# Patient Record
Sex: FEMALE
[De-identification: names, ages, dates, MRNs, and addresses within clinical notes are randomized per-mention and may not be internally consistent; named-entity substitution may affect disease eponyms.]

---

## 2017-09-01 ENCOUNTER — TRANSCRIPTION ENCOUNTER (OUTPATIENT)
Age: 77
End: 2017-09-01

## 2020-11-03 ENCOUNTER — INPATIENT (INPATIENT)
Facility: HOSPITAL | Age: 80
LOS: 22 days | DRG: 82 | End: 2020-11-26
Attending: INTERNAL MEDICINE | Admitting: SURGERY
Payer: MEDICARE

## 2020-11-03 VITALS — DIASTOLIC BLOOD PRESSURE: 100 MMHG | HEART RATE: 111 BPM | SYSTOLIC BLOOD PRESSURE: 210 MMHG | OXYGEN SATURATION: 96 %

## 2020-11-03 DIAGNOSIS — S06.5X9A TRAUMATIC SUBDURAL HEMORRHAGE WITH LOSS OF CONSCIOUSNESS OF UNSPECIFIED DURATION, INITIAL ENCOUNTER: ICD-10-CM

## 2020-11-03 LAB
ALBUMIN SERPL ELPH-MCNC: 3.7 G/DL — SIGNIFICANT CHANGE UP (ref 3.3–5)
ALP SERPL-CCNC: 186 U/L — HIGH (ref 40–120)
ALT FLD-CCNC: 38 U/L — SIGNIFICANT CHANGE UP (ref 10–45)
ANION GAP SERPL CALC-SCNC: 10 MMOL/L — SIGNIFICANT CHANGE UP (ref 5–17)
ANION GAP SERPL CALC-SCNC: 12 MMOL/L — SIGNIFICANT CHANGE UP (ref 5–17)
APTT BLD: 26.5 SEC — LOW (ref 27.5–35.5)
APTT BLD: 27.9 SEC — SIGNIFICANT CHANGE UP (ref 27.5–35.5)
AST SERPL-CCNC: 31 U/L — SIGNIFICANT CHANGE UP (ref 10–40)
BASOPHILS # BLD AUTO: 0.06 K/UL — SIGNIFICANT CHANGE UP (ref 0–0.2)
BASOPHILS NFR BLD AUTO: 0.5 % — SIGNIFICANT CHANGE UP (ref 0–2)
BILIRUB SERPL-MCNC: 0.4 MG/DL — SIGNIFICANT CHANGE UP (ref 0.2–1.2)
BUN SERPL-MCNC: 23 MG/DL — SIGNIFICANT CHANGE UP (ref 7–23)
BUN SERPL-MCNC: 25 MG/DL — HIGH (ref 7–23)
CALCIUM SERPL-MCNC: 7.8 MG/DL — LOW (ref 8.4–10.5)
CALCIUM SERPL-MCNC: 9.3 MG/DL — SIGNIFICANT CHANGE UP (ref 8.4–10.5)
CHLORIDE SERPL-SCNC: 107 MMOL/L — SIGNIFICANT CHANGE UP (ref 96–108)
CHLORIDE SERPL-SCNC: 110 MMOL/L — HIGH (ref 96–108)
CO2 SERPL-SCNC: 18 MMOL/L — LOW (ref 22–31)
CO2 SERPL-SCNC: 22 MMOL/L — SIGNIFICANT CHANGE UP (ref 22–31)
CREAT SERPL-MCNC: 0.6 MG/DL — SIGNIFICANT CHANGE UP (ref 0.5–1.3)
CREAT SERPL-MCNC: 0.81 MG/DL — SIGNIFICANT CHANGE UP (ref 0.5–1.3)
EOSINOPHIL # BLD AUTO: 0.1 K/UL — SIGNIFICANT CHANGE UP (ref 0–0.5)
EOSINOPHIL NFR BLD AUTO: 0.8 % — SIGNIFICANT CHANGE UP (ref 0–6)
ETHANOL SERPL-MCNC: SIGNIFICANT CHANGE UP MG/DL (ref 0–10)
GAS PNL BLDA: SIGNIFICANT CHANGE UP
GLUCOSE SERPL-MCNC: 129 MG/DL — HIGH (ref 70–99)
GLUCOSE SERPL-MCNC: 139 MG/DL — HIGH (ref 70–99)
HCT VFR BLD CALC: 34.9 % — SIGNIFICANT CHANGE UP (ref 34.5–45)
HCT VFR BLD CALC: 43.1 % — SIGNIFICANT CHANGE UP (ref 34.5–45)
HGB BLD-MCNC: 11.1 G/DL — LOW (ref 11.5–15.5)
HGB BLD-MCNC: 13.5 G/DL — SIGNIFICANT CHANGE UP (ref 11.5–15.5)
IMM GRANULOCYTES NFR BLD AUTO: 1 % — SIGNIFICANT CHANGE UP (ref 0–1.5)
INR BLD: 1.13 RATIO — SIGNIFICANT CHANGE UP (ref 0.88–1.16)
INR BLD: 1.19 RATIO — HIGH (ref 0.88–1.16)
LIDOCAIN IGE QN: 23 U/L — SIGNIFICANT CHANGE UP (ref 7–60)
LYMPHOCYTES # BLD AUTO: 1.73 K/UL — SIGNIFICANT CHANGE UP (ref 1–3.3)
LYMPHOCYTES # BLD AUTO: 14.2 % — SIGNIFICANT CHANGE UP (ref 13–44)
MAGNESIUM SERPL-MCNC: 2 MG/DL — SIGNIFICANT CHANGE UP (ref 1.6–2.6)
MCHC RBC-ENTMCNC: 28.1 PG — SIGNIFICANT CHANGE UP (ref 27–34)
MCHC RBC-ENTMCNC: 28.4 PG — SIGNIFICANT CHANGE UP (ref 27–34)
MCHC RBC-ENTMCNC: 31.3 GM/DL — LOW (ref 32–36)
MCHC RBC-ENTMCNC: 31.8 GM/DL — LOW (ref 32–36)
MCV RBC AUTO: 89.3 FL — SIGNIFICANT CHANGE UP (ref 80–100)
MCV RBC AUTO: 89.6 FL — SIGNIFICANT CHANGE UP (ref 80–100)
MONOCYTES # BLD AUTO: 0.98 K/UL — HIGH (ref 0–0.9)
MONOCYTES NFR BLD AUTO: 8 % — SIGNIFICANT CHANGE UP (ref 2–14)
NEUTROPHILS # BLD AUTO: 9.23 K/UL — HIGH (ref 1.8–7.4)
NEUTROPHILS NFR BLD AUTO: 75.5 % — SIGNIFICANT CHANGE UP (ref 43–77)
NRBC # BLD: 0 /100 WBCS — SIGNIFICANT CHANGE UP (ref 0–0)
NRBC # BLD: 0 /100 WBCS — SIGNIFICANT CHANGE UP (ref 0–0)
PHOSPHATE SERPL-MCNC: 3.1 MG/DL — SIGNIFICANT CHANGE UP (ref 2.5–4.5)
PLATELET # BLD AUTO: 392 K/UL — SIGNIFICANT CHANGE UP (ref 150–400)
PLATELET # BLD AUTO: 548 K/UL — HIGH (ref 150–400)
POTASSIUM SERPL-MCNC: 3.4 MMOL/L — LOW (ref 3.5–5.3)
POTASSIUM SERPL-MCNC: 5.2 MMOL/L — SIGNIFICANT CHANGE UP (ref 3.5–5.3)
POTASSIUM SERPL-SCNC: 3.4 MMOL/L — LOW (ref 3.5–5.3)
POTASSIUM SERPL-SCNC: 5.2 MMOL/L — SIGNIFICANT CHANGE UP (ref 3.5–5.3)
PROT SERPL-MCNC: 6.5 G/DL — SIGNIFICANT CHANGE UP (ref 6–8.3)
PROTHROM AB SERPL-ACNC: 13.5 SEC — SIGNIFICANT CHANGE UP (ref 10.6–13.6)
PROTHROM AB SERPL-ACNC: 14.2 SEC — HIGH (ref 10.6–13.6)
RBC # BLD: 3.91 M/UL — SIGNIFICANT CHANGE UP (ref 3.8–5.2)
RBC # BLD: 4.81 M/UL — SIGNIFICANT CHANGE UP (ref 3.8–5.2)
RBC # FLD: 14.3 % — SIGNIFICANT CHANGE UP (ref 10.3–14.5)
RBC # FLD: 14.3 % — SIGNIFICANT CHANGE UP (ref 10.3–14.5)
SODIUM SERPL-SCNC: 139 MMOL/L — SIGNIFICANT CHANGE UP (ref 135–145)
SODIUM SERPL-SCNC: 140 MMOL/L — SIGNIFICANT CHANGE UP (ref 135–145)
TROPONIN T, HIGH SENSITIVITY RESULT: 19 NG/L — SIGNIFICANT CHANGE UP (ref 0–51)
WBC # BLD: 11.61 K/UL — HIGH (ref 3.8–10.5)
WBC # BLD: 12.22 K/UL — HIGH (ref 3.8–10.5)
WBC # FLD AUTO: 11.61 K/UL — HIGH (ref 3.8–10.5)
WBC # FLD AUTO: 12.22 K/UL — HIGH (ref 3.8–10.5)

## 2020-11-03 PROCEDURE — 71260 CT THORAX DX C+: CPT | Mod: 26

## 2020-11-03 PROCEDURE — 99223 1ST HOSP IP/OBS HIGH 75: CPT

## 2020-11-03 PROCEDURE — 70450 CT HEAD/BRAIN W/O DYE: CPT | Mod: 26,77

## 2020-11-03 PROCEDURE — 72125 CT NECK SPINE W/O DYE: CPT | Mod: 26

## 2020-11-03 PROCEDURE — 70450 CT HEAD/BRAIN W/O DYE: CPT | Mod: 26

## 2020-11-03 PROCEDURE — 74177 CT ABD & PELVIS W/CONTRAST: CPT | Mod: 26

## 2020-11-03 PROCEDURE — 71045 X-RAY EXAM CHEST 1 VIEW: CPT | Mod: 26

## 2020-11-03 RX ORDER — ETOMIDATE 2 MG/ML
20 INJECTION INTRAVENOUS ONCE
Refills: 0 | Status: COMPLETED | OUTPATIENT
Start: 2020-11-03 | End: 2020-11-03

## 2020-11-03 RX ORDER — POTASSIUM CHLORIDE 20 MEQ
10 PACKET (EA) ORAL
Refills: 0 | Status: DISCONTINUED | OUTPATIENT
Start: 2020-11-03 | End: 2020-11-04

## 2020-11-03 RX ORDER — NICARDIPINE HYDROCHLORIDE 30 MG/1
5 CAPSULE, EXTENDED RELEASE ORAL
Qty: 40 | Refills: 0 | Status: DISCONTINUED | OUTPATIENT
Start: 2020-11-03 | End: 2020-11-04

## 2020-11-03 RX ORDER — VECURONIUM BROMIDE 20 MG/1
10 INJECTION, POWDER, FOR SOLUTION INTRAVENOUS ONCE
Refills: 0 | Status: COMPLETED | OUTPATIENT
Start: 2020-11-03 | End: 2020-11-03

## 2020-11-03 RX ORDER — SODIUM CHLORIDE 9 MG/ML
1000 INJECTION INTRAMUSCULAR; INTRAVENOUS; SUBCUTANEOUS ONCE
Refills: 0 | Status: COMPLETED | OUTPATIENT
Start: 2020-11-03 | End: 2020-11-03

## 2020-11-03 RX ORDER — FENTANYL CITRATE 50 UG/ML
50 INJECTION INTRAVENOUS ONCE
Refills: 0 | Status: DISCONTINUED | OUTPATIENT
Start: 2020-11-03 | End: 2020-11-03

## 2020-11-03 RX ORDER — MIDAZOLAM HYDROCHLORIDE 1 MG/ML
2 INJECTION, SOLUTION INTRAMUSCULAR; INTRAVENOUS ONCE
Refills: 0 | Status: DISCONTINUED | OUTPATIENT
Start: 2020-11-03 | End: 2020-11-03

## 2020-11-03 RX ORDER — FENTANYL CITRATE 50 UG/ML
0.5 INJECTION INTRAVENOUS
Qty: 5000 | Refills: 0 | Status: DISCONTINUED | OUTPATIENT
Start: 2020-11-03 | End: 2020-11-04

## 2020-11-03 RX ORDER — SUCCINYLCHOLINE CHLORIDE 100 MG/5ML
100 SYRINGE (ML) INTRAVENOUS ONCE
Refills: 0 | Status: COMPLETED | OUTPATIENT
Start: 2020-11-03 | End: 2020-11-03

## 2020-11-03 RX ORDER — SODIUM CHLORIDE 9 MG/ML
1000 INJECTION INTRAMUSCULAR; INTRAVENOUS; SUBCUTANEOUS
Refills: 0 | Status: DISCONTINUED | OUTPATIENT
Start: 2020-11-03 | End: 2020-11-05

## 2020-11-03 RX ORDER — LABETALOL HCL 100 MG
10 TABLET ORAL ONCE
Refills: 0 | Status: COMPLETED | OUTPATIENT
Start: 2020-11-03 | End: 2020-11-04

## 2020-11-03 RX ADMIN — VECURONIUM BROMIDE 10 MILLIGRAM(S): 20 INJECTION, POWDER, FOR SOLUTION INTRAVENOUS at 21:25

## 2020-11-03 RX ADMIN — Medication 100 MILLIGRAM(S): at 21:13

## 2020-11-03 RX ADMIN — SODIUM CHLORIDE 1000 MILLILITER(S): 9 INJECTION INTRAMUSCULAR; INTRAVENOUS; SUBCUTANEOUS at 21:18

## 2020-11-03 RX ADMIN — FENTANYL CITRATE 50 MICROGRAM(S): 50 INJECTION INTRAVENOUS at 22:26

## 2020-11-03 RX ADMIN — ETOMIDATE 20 MILLIGRAM(S): 2 INJECTION INTRAVENOUS at 21:12

## 2020-11-03 RX ADMIN — MIDAZOLAM HYDROCHLORIDE 2 MILLIGRAM(S): 1 INJECTION, SOLUTION INTRAMUSCULAR; INTRAVENOUS at 21:15
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
## 2020-11-03 NOTE — H&P ADULT - NSHPLABSRESULTS_GEN_ALL_CORE
LABS:  ABG - ( 03 Nov 2020 22:08 )  pH, Arterial: 7.42  pH, Blood: x     /  pCO2: 34    /  pO2: 346   / HCO3: 21    / Base Excess: -2.2  /  SaO2: 100                 CBC Full  -  ( 03 Nov 2020 22:25 )  WBC Count : 11.61 K/uL  RBC Count : 3.91 M/uL  Hemoglobin : 11.1 g/dL  Hematocrit : 34.9 %  Platelet Count - Automated : 392 K/uL  Mean Cell Volume : 89.3 fl  Mean Cell Hemoglobin : 28.4 pg  Mean Cell Hemoglobin Concentration : 31.8 gm/dL  Auto Neutrophil # : x  Auto Lymphocyte # : x  Auto Monocyte # : x  Auto Eosinophil # : x  Auto Basophil # : x  Auto Neutrophil % : x  Auto Lymphocyte % : x  Auto Monocyte % : x  Auto Eosinophil % : x  Auto Basophil % : x    11-03    139  |  107  |  25<H>  ----------------------------<  139<H>  5.2   |  22  |  0.81    Ca    9.3      03 Nov 2020 21:20    TPro  6.5  /  Alb  3.7  /  TBili  0.4  /  DBili  x   /  AST  31  /  ALT  38  /  AlkPhos  186<H>  11-03    PT/INR - ( 03 Nov 2020 21:20 )   PT: 13.5 sec;   INR: 1.13 ratio         PTT - ( 03 Nov 2020 21:20 )  PTT:27.9 sec      RADIOLOGY & ADDITIONAL STUDIES  CXR:   CT head :  CT C-Spine:  CT C/A/P: LABS:  ABG - ( 2020 03:17 )  pH, Arterial: 7.44  pH, Blood: x     /  pCO2: 28    /  pO2: 153   / HCO3: 19    / Base Excess: -3.9  /  SaO2: 99                  CBC Full  -  ( 2020 03:13 )  WBC Count : 18.58 K/uL  RBC Count : 3.97 M/uL  Hemoglobin : 11.1 g/dL  Hematocrit : 34.9 %  Platelet Count - Automated : 448 K/uL  Mean Cell Volume : 87.9 fl  Mean Cell Hemoglobin : 28.0 pg  Mean Cell Hemoglobin Concentration : 31.8 gm/dL  Auto Neutrophil # : x  Auto Lymphocyte # : x  Auto Monocyte # : x  Auto Eosinophil # : x  Auto Basophil # : x  Auto Neutrophil % : x  Auto Lymphocyte % : x  Auto Monocyte % : x  Auto Eosinophil % : x  Auto Basophil % : x    11-04    138  |  111<H>  |  18  ----------------------------<  139<H>  4.7   |  18<L>  |  0.59    Ca    7.9<L>      2020 03:13  Phos  2.4     -  Mg     2.1     -    TPro  6.5  /  Alb  3.7  /  TBili  0.4  /  DBili  x   /  AST  31  /  ALT  38  /  AlkPhos  186<H>  11-03    PT/INR - ( 2020 03:13 )   PT: 14.3 sec;   INR: 1.20 ratio         PTT - ( 2020 03:13 )  PTT:26.7 sec      Urinalysis Basic - ( 2020 00:20 )    Color: Light Orange / Appearance: Turbid / S.029 / pH: x  Gluc: x / Ketone: Negative  / Bili: Negative / Urobili: Negative   Blood: x / Protein: Trace / Nitrite: Positive   Leuk Esterase: Large / RBC: 11 /hpf /  /HPF   Sq Epi: x / Non Sq Epi: 1 /hpf / Bacteria: Many        Head CT: Acute left holohemispheric subdural hemorrhage and right parietotemporal subdural hemorrhage. Subdural hemorrhage extends along the falx and left side of the medulla. There is associated 1.2 cm rightward midline shift.    Cervical spine CT: No acute fracture. Cervical degenerative spondylosis, as described above.    CT C/A/P:Acute fractures of the left lateral eighth, ninth, and 10th ribs.    Ill-defined masslike region of the left hepatic lobe is indeterminate. Differential includes benign and malignant etiologies as well as possibility of a pseudolesion with heterogeneous fat deposition. Recommend MRI of the abdomen with intravenous contrast for further evaluation.    Solid left renal lesion compatible with renal cell carcinoma until proven otherwise.

## 2020-11-03 NOTE — ED ADULT NURSE NOTE - TEMPLATE LIST FOR HEAD TO TOE ASSESSMENT
Pt complains of left lower back pain started Friday, noted that it flared up more yesterday at work.  Pt states the pain radiates down his left buttocks.His last dose of ibuprofen was last night  
Fall

## 2020-11-03 NOTE — ED PROVIDER NOTE - PHYSICAL EXAMINATION
Exam:  General: Patient shallow breathing, no response to verbal stimuli, significantly hypertensive, satting 100% on NRB otherwise vital signs within normal limits  HEENT: c-collar present, L forehead hematoma  Cardiac: RRR S1/S2 with strong peripheral pulses  Respiratory: shallow respirations  GI: abdomen soft, no appreciable trauma  Neuro: limited exam, spontaneously moving bilateral upper extremities, withdrawing lower extremities to pain  MSK: no appreciated extremity deformity

## 2020-11-03 NOTE — ED PROVIDER NOTE - ATTENDING CONTRIBUTION TO CARE
Patient seen and evaluated with resident/NP/PA, however HPI, ROS, PE and MDM as documented authored by myself unless otherwise noted- Sj Calvin MD
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
## 2020-11-03 NOTE — ED PROVIDER NOTE - CLINICAL SUMMARY MEDICAL DECISION MAKING FREE TEXT BOX
Patient BIBEMS unresponsive with head injury, no further history available, intubated in Emergency Department for level of consciousness to protect airway, level I trauma activated, after ETT placement confirmed and primary/secondary survey completed taken to CT scan with trauma surgery where acute subdural noted on rapid review of CTH, no obvious traumatic pathology appreciated on brief review of other scans in radiology control room, trauma surgery to admit SICU.

## 2020-11-03 NOTE — ED PROVIDER NOTE - OBJECTIVE STATEMENT
Patient BIBEMS unresponsive from nursing home.  Per EMS they were told by NH staff that patient fell and was unresponsive afterwards, unclear what caused fall.  On their arrival patient was breathing but not responding.  Patient unable to give history on arrival to Emergency Department.  No medical history/medications/advance directives or other documentation available from EMS brought to Emergency Department.

## 2020-11-03 NOTE — H&P ADULT - ATTENDING COMMENTS
Pt seen and examined as Level 1 trauma activation. Agree with A/P. Admit to ACS, SICU. Intubated, sedated, repeat CT head pending, f/u imaging, keppra, hold chemical dvt prophylaxis.

## 2020-11-03 NOTE — H&P ADULT - HISTORY OF PRESENT ILLNESS
HPI: This is an 80 year old female, with a medical history significant for dementia, not on A/C, BIBEMS unresponsive with head injury, reportedly fell, no further history available.   In the trauma bay, she was intubated for level of consciousness to protect airway, GCS was 8, level I trauma activated. In addition, primary survey significant for  mm Hg. After ETT placement confirmed and primary/secondary survey completed, significant for 1 cm laceration in left side of forehead and abrasion in RLQ of abdomen. She was taken to CT scan where acute subdural noted on rapid review of CTH was seen, no obvious traumatic pathology appreciated.     Primary survey   Airway: protected with ETT  breathing: b/l breath sounds   Circulation: -220, strong pulses in 4 extremities   Disability: GCS 8 before intubation   Exposure: obtained     secondary survey   General: Patient shallow breathing, no response to verbal stimuli, significantly hypertensive, saturation is 99% on NRB   HEENT: c-collar present, L forehead hematoma and laceration, pupils b/l 2 mm reactive   Cardiac: RRR,  strong peripheral pulses  Respiratory: shallow respirations  GI: abdomen soft, ND, NTTP   Pelvis: stable   Neuro: limited exam, spontaneously moving bilateral upper extremities, withdrawing lower extremities to pain  MSK: no appreciated extremity deformity    UTO medical and surgical history from the patient, as per record from nursing home, the patient has dementia, hydrocephalus and has a  shunt, acetabular fx

## 2020-11-03 NOTE — H&P ADULT - ASSESSMENT
A/P: This is an 80 year old female, with a medical history significant for dementia, not on A/C, BIBEMS unresponsive with head injury, reportedly fell, intubated in the ED to protect airway, imaging studies showed left acute SDH.     - Admit to ACS/Trauma - Dr. Bosch   - SICU consultation   - Appreciate neurosurgery input for the management of SDH  - follow up final read of trauma scans   - Tertiary survey in 24 hours     9007 A/P: This is an 80 year old female, with a medical history significant for dementia, not on A/C, BIBEMS unresponsive with head injury, reportedly fell, intubated in the ED to protect airway, imaging slabstudies showed left acute SDH, repeat CT after ligation shows stable SDH.     - Admit to ACS/Trauma - Dr. Bosch   - SICU consultation   - Appreciate neurosurgery input for the management of SDH (Conservative management at this time, repeat head CT in AM  - Tertiary survey in 24 hours     [id number]

## 2020-11-04 DIAGNOSIS — Z98.2 PRESENCE OF CEREBROSPINAL FLUID DRAINAGE DEVICE: Chronic | ICD-10-CM

## 2020-11-04 LAB
ANION GAP SERPL CALC-SCNC: 12 MMOL/L — SIGNIFICANT CHANGE UP (ref 5–17)
ANION GAP SERPL CALC-SCNC: 9 MMOL/L — SIGNIFICANT CHANGE UP (ref 5–17)
APPEARANCE UR: ABNORMAL
APTT BLD: 26.7 SEC — LOW (ref 27.5–35.5)
APTT BLD: 28.2 SEC — SIGNIFICANT CHANGE UP (ref 27.5–35.5)
BILIRUB UR-MCNC: NEGATIVE — SIGNIFICANT CHANGE UP
BLD GP AB SCN SERPL QL: POSITIVE — SIGNIFICANT CHANGE UP
BUN SERPL-MCNC: 18 MG/DL — SIGNIFICANT CHANGE UP (ref 7–23)
BUN SERPL-MCNC: 20 MG/DL — SIGNIFICANT CHANGE UP (ref 7–23)
CALCIUM SERPL-MCNC: 7.9 MG/DL — LOW (ref 8.4–10.5)
CALCIUM SERPL-MCNC: 8.2 MG/DL — LOW (ref 8.4–10.5)
CHLORIDE SERPL-SCNC: 108 MMOL/L — SIGNIFICANT CHANGE UP (ref 96–108)
CHLORIDE SERPL-SCNC: 111 MMOL/L — HIGH (ref 96–108)
CO2 SERPL-SCNC: 18 MMOL/L — LOW (ref 22–31)
CO2 SERPL-SCNC: 18 MMOL/L — LOW (ref 22–31)
COLOR SPEC: ABNORMAL
CREAT SERPL-MCNC: 0.57 MG/DL — SIGNIFICANT CHANGE UP (ref 0.5–1.3)
CREAT SERPL-MCNC: 0.59 MG/DL — SIGNIFICANT CHANGE UP (ref 0.5–1.3)
DAT POLY-SP REAG RBC QL: NEGATIVE — SIGNIFICANT CHANGE UP
DIFF PNL FLD: ABNORMAL
GAS PNL BLDA: SIGNIFICANT CHANGE UP
GAS PNL BLDA: SIGNIFICANT CHANGE UP
GLUCOSE SERPL-MCNC: 139 MG/DL — HIGH (ref 70–99)
GLUCOSE SERPL-MCNC: 176 MG/DL — HIGH (ref 70–99)
GLUCOSE UR QL: NEGATIVE — SIGNIFICANT CHANGE UP
HCT VFR BLD CALC: 34.9 % — SIGNIFICANT CHANGE UP (ref 34.5–45)
HCT VFR BLD CALC: 37.9 % — SIGNIFICANT CHANGE UP (ref 34.5–45)
HEPARINASE TEG R TIME: 4.9 MIN — SIGNIFICANT CHANGE UP (ref 4.3–8.3)
HGB BLD-MCNC: 11.1 G/DL — LOW (ref 11.5–15.5)
HGB BLD-MCNC: 11.9 G/DL — SIGNIFICANT CHANGE UP (ref 11.5–15.5)
INR BLD: 1.18 RATIO — HIGH (ref 0.88–1.16)
INR BLD: 1.2 RATIO — HIGH (ref 0.88–1.16)
KETONES UR-MCNC: NEGATIVE — SIGNIFICANT CHANGE UP
LEUKOCYTE ESTERASE UR-ACNC: ABNORMAL
MAGNESIUM SERPL-MCNC: 2.1 MG/DL — SIGNIFICANT CHANGE UP (ref 1.6–2.6)
MAGNESIUM SERPL-MCNC: 2.2 MG/DL — SIGNIFICANT CHANGE UP (ref 1.6–2.6)
MCHC RBC-ENTMCNC: 27.9 PG — SIGNIFICANT CHANGE UP (ref 27–34)
MCHC RBC-ENTMCNC: 28 PG — SIGNIFICANT CHANGE UP (ref 27–34)
MCHC RBC-ENTMCNC: 31.4 GM/DL — LOW (ref 32–36)
MCHC RBC-ENTMCNC: 31.8 GM/DL — LOW (ref 32–36)
MCV RBC AUTO: 87.9 FL — SIGNIFICANT CHANGE UP (ref 80–100)
MCV RBC AUTO: 89 FL — SIGNIFICANT CHANGE UP (ref 80–100)
NITRITE UR-MCNC: POSITIVE
NRBC # BLD: 0 /100 WBCS — SIGNIFICANT CHANGE UP (ref 0–0)
NRBC # BLD: 0 /100 WBCS — SIGNIFICANT CHANGE UP (ref 0–0)
PCP SPEC-MCNC: SIGNIFICANT CHANGE UP
PH UR: 7 — SIGNIFICANT CHANGE UP (ref 5–8)
PHOSPHATE SERPL-MCNC: 2.4 MG/DL — LOW (ref 2.5–4.5)
PHOSPHATE SERPL-MCNC: 2.9 MG/DL — SIGNIFICANT CHANGE UP (ref 2.5–4.5)
PLATELET # BLD AUTO: 448 K/UL — HIGH (ref 150–400)
PLATELET # BLD AUTO: 513 K/UL — HIGH (ref 150–400)
PLATELET MAPPING ACTF MAX AMPLITUDE: 27.5 MM (ref 2–19)
PLATELET MAPPING ADP MAXIMUM AMPLITUDE: 49 MM — SIGNIFICANT CHANGE UP (ref 45–69)
PLATELET MAPPING ADP PERCENT INHIBITION: 50.3 % (ref 0–17)
PLATELET MAPPING HKH MAXIMUM AMPLITUDE: 70.8 MM (ref 53–68)
POTASSIUM SERPL-MCNC: 4 MMOL/L — SIGNIFICANT CHANGE UP (ref 3.5–5.3)
POTASSIUM SERPL-MCNC: 4.7 MMOL/L — SIGNIFICANT CHANGE UP (ref 3.5–5.3)
POTASSIUM SERPL-SCNC: 4 MMOL/L — SIGNIFICANT CHANGE UP (ref 3.5–5.3)
POTASSIUM SERPL-SCNC: 4.7 MMOL/L — SIGNIFICANT CHANGE UP (ref 3.5–5.3)
PROT UR-MCNC: ABNORMAL
PROTHROM AB SERPL-ACNC: 14 SEC — HIGH (ref 10.6–13.6)
PROTHROM AB SERPL-ACNC: 14.3 SEC — HIGH (ref 10.6–13.6)
RAPIDTEG MAXIMUM AMPLITUDE: 71.3 MM (ref 52–70)
RBC # BLD: 3.97 M/UL — SIGNIFICANT CHANGE UP (ref 3.8–5.2)
RBC # BLD: 4.26 M/UL — SIGNIFICANT CHANGE UP (ref 3.8–5.2)
RBC # FLD: 14.4 % — SIGNIFICANT CHANGE UP (ref 10.3–14.5)
RBC # FLD: 14.5 % — SIGNIFICANT CHANGE UP (ref 10.3–14.5)
RH IG SCN BLD-IMP: POSITIVE — SIGNIFICANT CHANGE UP
SARS-COV-2 IGG SERPL QL IA: NEGATIVE — SIGNIFICANT CHANGE UP
SARS-COV-2 IGM SERPL IA-ACNC: 0.09 INDEX — SIGNIFICANT CHANGE UP
SARS-COV-2 RNA SPEC QL NAA+PROBE: SIGNIFICANT CHANGE UP
SODIUM SERPL-SCNC: 138 MMOL/L — SIGNIFICANT CHANGE UP (ref 135–145)
SODIUM SERPL-SCNC: 138 MMOL/L — SIGNIFICANT CHANGE UP (ref 135–145)
SP GR SPEC: 1.03 — HIGH (ref 1.01–1.02)
TEG FUNCTIONAL FIBRINOGEN: 40.5 MM (ref 15–32)
TEG MAXIMUM AMPLITUDE: 70.6 MM (ref 52–69)
TEG REACTION TIME: 5.3 MIN — SIGNIFICANT CHANGE UP (ref 4.6–9.1)
UROBILINOGEN FLD QL: NEGATIVE — SIGNIFICANT CHANGE UP
WBC # BLD: 18.58 K/UL — HIGH (ref 3.8–10.5)
WBC # BLD: 22.51 K/UL — HIGH (ref 3.8–10.5)
WBC # FLD AUTO: 18.58 K/UL — HIGH (ref 3.8–10.5)
WBC # FLD AUTO: 22.51 K/UL — HIGH (ref 3.8–10.5)

## 2020-11-04 PROCEDURE — 72170 X-RAY EXAM OF PELVIS: CPT | Mod: 26

## 2020-11-04 PROCEDURE — 99291 CRITICAL CARE FIRST HOUR: CPT | Mod: 25

## 2020-11-04 PROCEDURE — 99232 SBSQ HOSP IP/OBS MODERATE 35: CPT

## 2020-11-04 PROCEDURE — 36620 INSERTION CATHETER ARTERY: CPT

## 2020-11-04 PROCEDURE — 71045 X-RAY EXAM CHEST 1 VIEW: CPT | Mod: 26

## 2020-11-04 PROCEDURE — 86077 PHYS BLOOD BANK SERV XMATCH: CPT

## 2020-11-04 PROCEDURE — 70450 CT HEAD/BRAIN W/O DYE: CPT | Mod: 26

## 2020-11-04 RX ORDER — FENTANYL CITRATE 50 UG/ML
25 INJECTION INTRAVENOUS
Refills: 0 | Status: DISCONTINUED | OUTPATIENT
Start: 2020-11-04 | End: 2020-11-09

## 2020-11-04 RX ORDER — ACETAMINOPHEN 500 MG
1000 TABLET ORAL EVERY 6 HOURS
Refills: 0 | Status: COMPLETED | OUTPATIENT
Start: 2020-11-04 | End: 2020-11-05

## 2020-11-04 RX ORDER — PHENYLEPHRINE HYDROCHLORIDE 10 MG/ML
0.4 INJECTION INTRAVENOUS
Qty: 40 | Refills: 0 | Status: DISCONTINUED | OUTPATIENT
Start: 2020-11-04 | End: 2020-11-04

## 2020-11-04 RX ORDER — SODIUM CHLORIDE 9 MG/ML
1000 INJECTION INTRAMUSCULAR; INTRAVENOUS; SUBCUTANEOUS ONCE
Refills: 0 | Status: COMPLETED | OUTPATIENT
Start: 2020-11-04 | End: 2020-11-04

## 2020-11-04 RX ORDER — DEXMEDETOMIDINE HYDROCHLORIDE IN 0.9% SODIUM CHLORIDE 4 UG/ML
0.5 INJECTION INTRAVENOUS
Qty: 200 | Refills: 0 | Status: DISCONTINUED | OUTPATIENT
Start: 2020-11-04 | End: 2020-11-04

## 2020-11-04 RX ORDER — CHLORHEXIDINE GLUCONATE 213 G/1000ML
15 SOLUTION TOPICAL EVERY 12 HOURS
Refills: 0 | Status: DISCONTINUED | OUTPATIENT
Start: 2020-11-04 | End: 2020-11-06

## 2020-11-04 RX ORDER — LEVETIRACETAM 250 MG/1
500 TABLET, FILM COATED ORAL EVERY 12 HOURS
Refills: 0 | Status: DISCONTINUED | OUTPATIENT
Start: 2020-11-04 | End: 2020-11-08

## 2020-11-04 RX ORDER — CEFEPIME 1 G/1
1000 INJECTION, POWDER, FOR SOLUTION INTRAMUSCULAR; INTRAVENOUS ONCE
Refills: 0 | Status: COMPLETED | OUTPATIENT
Start: 2020-11-04 | End: 2020-11-04

## 2020-11-04 RX ORDER — CEFEPIME 1 G/1
1000 INJECTION, POWDER, FOR SOLUTION INTRAMUSCULAR; INTRAVENOUS EVERY 12 HOURS
Refills: 0 | Status: DISCONTINUED | OUTPATIENT
Start: 2020-11-04 | End: 2020-11-06

## 2020-11-04 RX ORDER — ACETAMINOPHEN 500 MG
1000 TABLET ORAL ONCE
Refills: 0 | Status: COMPLETED | OUTPATIENT
Start: 2020-11-04 | End: 2020-11-04

## 2020-11-04 RX ORDER — LEVETIRACETAM 250 MG/1
500 TABLET, FILM COATED ORAL EVERY 12 HOURS
Refills: 0 | Status: DISCONTINUED | OUTPATIENT
Start: 2020-11-04 | End: 2020-11-04

## 2020-11-04 RX ORDER — MEROPENEM 1 G/30ML
1000 INJECTION INTRAVENOUS EVERY 8 HOURS
Refills: 0 | Status: DISCONTINUED | OUTPATIENT
Start: 2020-11-04 | End: 2020-11-04

## 2020-11-04 RX ORDER — FENTANYL CITRATE 50 UG/ML
25 INJECTION INTRAVENOUS ONCE
Refills: 0 | Status: DISCONTINUED | OUTPATIENT
Start: 2020-11-04 | End: 2020-11-04

## 2020-11-04 RX ORDER — CHLORHEXIDINE GLUCONATE 213 G/1000ML
1 SOLUTION TOPICAL DAILY
Refills: 0 | Status: DISCONTINUED | OUTPATIENT
Start: 2020-11-04 | End: 2020-11-06

## 2020-11-04 RX ORDER — CEFEPIME 1 G/1
INJECTION, POWDER, FOR SOLUTION INTRAMUSCULAR; INTRAVENOUS
Refills: 0 | Status: DISCONTINUED | OUTPATIENT
Start: 2020-11-04 | End: 2020-11-06

## 2020-11-04 RX ORDER — PANTOPRAZOLE SODIUM 20 MG/1
40 TABLET, DELAYED RELEASE ORAL DAILY
Refills: 0 | Status: DISCONTINUED | OUTPATIENT
Start: 2020-11-04 | End: 2020-11-09

## 2020-11-04 RX ADMIN — SODIUM CHLORIDE 75 MILLILITER(S): 9 INJECTION INTRAMUSCULAR; INTRAVENOUS; SUBCUTANEOUS at 19:09

## 2020-11-04 RX ADMIN — FENTANYL CITRATE 25 MICROGRAM(S): 50 INJECTION INTRAVENOUS at 12:51

## 2020-11-04 RX ADMIN — FENTANYL CITRATE 25 MICROGRAM(S): 50 INJECTION INTRAVENOUS at 21:00

## 2020-11-04 RX ADMIN — CEFEPIME 100 MILLIGRAM(S): 1 INJECTION, POWDER, FOR SOLUTION INTRAMUSCULAR; INTRAVENOUS at 17:14

## 2020-11-04 RX ADMIN — DEXMEDETOMIDINE HYDROCHLORIDE IN 0.9% SODIUM CHLORIDE 8.5 MICROGRAM(S)/KG/HR: 4 INJECTION INTRAVENOUS at 03:22

## 2020-11-04 RX ADMIN — FENTANYL CITRATE 25 MICROGRAM(S): 50 INJECTION INTRAVENOUS at 10:15

## 2020-11-04 RX ADMIN — FENTANYL CITRATE 25 MICROGRAM(S): 50 INJECTION INTRAVENOUS at 13:05

## 2020-11-04 RX ADMIN — CHLORHEXIDINE GLUCONATE 15 MILLILITER(S): 213 SOLUTION TOPICAL at 17:07

## 2020-11-04 RX ADMIN — FENTANYL CITRATE 25 MICROGRAM(S): 50 INJECTION INTRAVENOUS at 17:07

## 2020-11-04 RX ADMIN — PHENYLEPHRINE HYDROCHLORIDE 11.3 MICROGRAM(S)/KG/MIN: 10 INJECTION INTRAVENOUS at 00:46

## 2020-11-04 RX ADMIN — FENTANYL CITRATE 25 MICROGRAM(S): 50 INJECTION INTRAVENOUS at 13:20

## 2020-11-04 RX ADMIN — Medication 100 MILLIEQUIVALENT(S): at 02:05

## 2020-11-04 RX ADMIN — Medication 62.5 MILLIMOLE(S): at 04:20

## 2020-11-04 RX ADMIN — LEVETIRACETAM 400 MILLIGRAM(S): 250 TABLET, FILM COATED ORAL at 18:13

## 2020-11-04 RX ADMIN — CEFEPIME 100 MILLIGRAM(S): 1 INJECTION, POWDER, FOR SOLUTION INTRAMUSCULAR; INTRAVENOUS at 09:33

## 2020-11-04 RX ADMIN — PANTOPRAZOLE SODIUM 40 MILLIGRAM(S): 20 TABLET, DELAYED RELEASE ORAL at 11:18

## 2020-11-04 RX ADMIN — FENTANYL CITRATE 25 MICROGRAM(S): 50 INJECTION INTRAVENOUS at 15:06

## 2020-11-04 RX ADMIN — Medication 10 MILLIGRAM(S): at 00:44

## 2020-11-04 RX ADMIN — FENTANYL CITRATE 25 MICROGRAM(S): 50 INJECTION INTRAVENOUS at 15:20

## 2020-11-04 RX ADMIN — Medication 100 MILLIEQUIVALENT(S): at 02:03

## 2020-11-04 RX ADMIN — CHLORHEXIDINE GLUCONATE 15 MILLILITER(S): 213 SOLUTION TOPICAL at 05:32

## 2020-11-04 RX ADMIN — NICARDIPINE HYDROCHLORIDE 25 MG/HR: 30 CAPSULE, EXTENDED RELEASE ORAL at 00:45

## 2020-11-04 RX ADMIN — FENTANYL CITRATE 2.13 MICROGRAM(S)/KG/HR: 50 INJECTION INTRAVENOUS at 00:46

## 2020-11-04 RX ADMIN — LEVETIRACETAM 400 MILLIGRAM(S): 250 TABLET, FILM COATED ORAL at 03:22

## 2020-11-04 RX ADMIN — FENTANYL CITRATE 25 MICROGRAM(S): 50 INJECTION INTRAVENOUS at 23:42

## 2020-11-04 RX ADMIN — LEVETIRACETAM 400 MILLIGRAM(S): 250 TABLET, FILM COATED ORAL at 05:32

## 2020-11-04 RX ADMIN — FENTANYL CITRATE 25 MICROGRAM(S): 50 INJECTION INTRAVENOUS at 20:38

## 2020-11-04 RX ADMIN — SODIUM CHLORIDE 1000 MILLILITER(S): 9 INJECTION INTRAMUSCULAR; INTRAVENOUS; SUBCUTANEOUS at 04:21

## 2020-11-04 RX ADMIN — Medication 100 MILLIEQUIVALENT(S): at 00:43

## 2020-11-04 RX ADMIN — FENTANYL CITRATE 25 MICROGRAM(S): 50 INJECTION INTRAVENOUS at 10:02

## 2020-11-04 RX ADMIN — Medication 400 MILLIGRAM(S): at 03:45

## 2020-11-04 RX ADMIN — FENTANYL CITRATE 25 MICROGRAM(S): 50 INJECTION INTRAVENOUS at 17:22

## 2020-11-04 RX ADMIN — Medication 400 MILLIGRAM(S): at 23:05

## 2020-11-04 RX ADMIN — MEROPENEM 100 MILLIGRAM(S): 1 INJECTION INTRAVENOUS at 05:32

## 2020-11-04 NOTE — CONSULT NOTE ADULT - ASSESSMENT
80F hx of dementia, hydrocephalus s/p  shunt and recent hip fracture non-operative management at SNF presents from SNF after a mechanical fall, level I trauma activated, primary survey significant for GCS 8, decision was made to intubate in trauma bay. Secondary survey significant for L forehead hematoma, R lower abdomen abrasion. CT scan significant for large L SDH with midline shift and L 8-10 rib fx. NSGY and family discussed, plan for non-operative management at this time. Patient was transferred to SICU for vent management and hemodynamic monitoring.    Neuro:  -repeat CT head in AM  -keppra 500 BID  -fentanyl gtt    Resp: L 8-10 rib fx  -14/450/5/40  -f/u AM CXR    CV: hypertensive, briefly on cardene gtt  -SBP goal 120-180    GI: incidental liver mass  -NPO  -liver mass incidental finding needs to be communicated with family    :  -page    Heme:  -hold VTE ppx  -f/u CBC, coags, TEG (sent on admission)    ID: UTI  -f/u UCx, BCx  -meropenem (penicillin allergy)    Endo:  -monitor glucose on BMP    MSK: L hip fx  -f/u XR pelvis AP, ortho    Dispo:  -SICU  -full code 80F hx of dementia, hydrocephalus s/p  shunt and recent hip fracture non-operative management at SNF presents from SNF after a mechanical fall, level I trauma activated, primary survey significant for GCS 8, decision was made to intubate in trauma bay. Secondary survey significant for L forehead hematoma, R lower abdomen abrasion. CT scan significant for large L SDH with midline shift and L 8-10 rib fx. NSGY and family discussed, plan for non-operative management at this time. Patient was transferred to SICU for vent management and hemodynamic monitoring.    Neuro:  -repeat CT head in AM  -keppra 500 BID  -fentanyl gtt    Resp: L 8-10 rib fx  -14/450/5/40  -f/u AM CXR    CV: hypertensive, briefly on cardene gtt  -SBP goal 120-180    GI: incidental liver mass  -NPO  -liver mass incidental finding needs to be communicated with family    : L renal lesion consistent with RCC on CT imaging  -L renal lesion needs to be communicated with family  -camilo    Heme:  -hold VTE ppx  -f/u CBC, coags, TEG (sent on admission)    ID: UTI  -f/u UCx, BCx  -meropenem (penicillin allergy)    Endo:  -monitor glucose on BMP    MSK: L hip fx  -f/u XR pelvis AP, ortho    Dispo:  -SICU  -full code

## 2020-11-04 NOTE — CONSULT NOTE ADULT - SUBJECTIVE AND OBJECTIVE BOX
HPI:  Latasha Harvey is an 80 year old female, with a medical history significant for dementia and hydrocephalus s/p  shunt was admitted after becoming unresponsive after a head injury when she reportedly fell at her nursing facility. In the ED, she was intubated for level of consciousness to protect airway, GCS was 8, level I trauma activated. After ETT placement confirmed and primary/secondary survey completed, significant for 1 cm laceration in left side of forehead and abrasion in RLQ of abdomen. She was taken to CT scan where acute subdural noted on rapid review of CTH was seen. Neurosurgery was consulted for acute left interhemispheric SDH recommending conservative management, blood pressure control, seizure prophylaxis. Patient required fentanyl pushes for agitation and synchrony with vent. Was advised for repeat CT scan which showed new R cerebellar infarcts for which neurology was consulted.   Patient was seen and examined at the bedside and remained intubated and was not responding to commands.     (Stroke only)  NIHSS: 20  MRS: unclear likely at least 3    REVIEW OF SYSTEMS    Unable to obtain secondary to intubated status.     PAST MEDICAL & SURGICAL HISTORY:  H/O hydrocephalus    Dementia    S/P ventriculoperitoneal shunt    MEDICATIONS (HOME):  Home Medications:  Tylenol 325 mg oral tablet: 2 tab(s) orally every 6 hours, As Needed (2020 01:50)    MEDICATIONS  (STANDING):  cefepime   IVPB      cefepime   IVPB 1000 milliGRAM(s) IV Intermittent every 12 hours  chlorhexidine 0.12% Liquid 15 milliLiter(s) Oral Mucosa every 12 hours  chlorhexidine 2% Cloths 1 Application(s) Topical daily  levETIRAcetam  IVPB 500 milliGRAM(s) IV Intermittent every 12 hours  pantoprazole  Injectable 40 milliGRAM(s) IV Push daily  sodium chloride 0.9%. 1000 milliLiter(s) (75 mL/Hr) IV Continuous <Continuous>    MEDICATIONS  (PRN):  fentaNYL    Injectable 25 MICROGram(s) IV Push every 2 hours PRN pain    ALLERGIES/INTOLERANCES:  Allergies  penicillin (Unknown)    Intolerances    VITALS & EXAMINATION:  Vital Signs Last 24 Hrs  T(C): 37.7 (2020 15:00), Max: 38.5 (2020 03:00)  T(F): 99.9 (2020 15:00), Max: 101.3 (2020 03:00)  HR: 85 (2020 16:18) (73 - 111)  BP: 156/72 (2020 16:00) (111/55 - 235/91)  BP(mean): 104 (2020 16:00) (77 - 161)  RR: 16 (2020 16:00) (9 - 30)  SpO2: 100% (2020 16:18) (96% - 100%)    General:  Constitutional: Appears stated age, intubated.   Head: Noted forehead abrasion on the L side of the forehead.    Neurological (>12):  MS: Intubated, not following commands or responsive to vocal stimulation. Grimaces to sternal rub.     Language: Intubated.     CNs: (R = 2mm, L = 2mm) Poorly reactive. No noted blink to threat. Corneal reflex intact. Gag reflex intact. No facial asymmetry b/l.     Motor: Noted repetitive thumb to pointer tremor bilaterally at rest. Increased tone in the L LE in extension.   Withdrawal to noxious stimuli in all 4 extremities. Reduced withdrawal in the LLE.     Sensation: Withdrawal to noxious stimuli in all 4 extremities and reduced in the LLE.     Reflexes:              Biceps(C5)       BR(C6)     Triceps(C7)               Patellar(L4)    Achilles(S1)    Plantar Resp  R	3	          3	             3		        3		    2		Up   L	3	          3	             3		        unable to test due to tone	Up    Coordination: unable to test    Gait: Deferred     LABORATORY:  CBC                       11.1   18.58 )-----------( 448      ( 2020 03:13 )             34.9     Chem 11-04    138  |  111<H>  |  18  ----------------------------<  139<H>  4.7   |  18<L>  |  0.59    Ca    7.9<L>      2020 03:13  Phos  2.4     11-04  Mg     2.1     11-04    TPro  6.5  /  Alb  3.7  /  TBili  0.4  /  DBili  x   /  AST  31  /  ALT  38  /  AlkPhos  186<H>  11-03    LFTs LIVER FUNCTIONS - ( 2020 21:20 )  Alb: 3.7 g/dL / Pro: 6.5 g/dL / ALK PHOS: 186 U/L / ALT: 38 U/L / AST: 31 U/L / GGT: x           Coagulopathy PT/INR - ( 2020 03:13 )   PT: 14.3 sec;   INR: 1.20 ratio         PTT - ( 2020 03:13 )  PTT:26.7 sec    U/A Urinalysis Basic - ( 2020 00:20 )    Color: Light Orange / Appearance: Turbid / S.029 / pH: x  Gluc: x / Ketone: Negative  / Bili: Negative / Urobili: Negative   Blood: x / Protein: Trace / Nitrite: Positive   Leuk Esterase: Large / RBC: 11 /hpf /  /HPF   Sq Epi: x / Non Sq Epi: 1 /hpf / Bacteria: Many      STUDIES & IMAGING:    Radiology (XR, CT, MR, U/S, TTE/JUNAID):      < from: CT Head No Cont (20 @ 13:13) >  FINDINGS:    Redemonstration of right parietal approach ventriculostomy catheter in unchanged position.    Acute left lateral convexity subdural hemorrhage measures 1.7 cm in greatest depth, decreased from 1.9 cm.    Acute right lateral convexity subdural hemorrhage measures 6 mm in greatest depth, similar to the prior examination.    Acute subdural hemorrhage in the interhemispheric fissure measures 9 mm in greatest thickness, previously measuring 11 mm    Acute subdural hemorrhage along the tentorial leaves is similar, measuring4 to 5 mm in greatest thickness on the left.    Rightward midline shift of 7 mm is decreased from 11 mm. Increased ventricular size, no large hydrocephalus. Basal cisterns are more well visualized on the current examination.    Similar nonspecific 6 mm focus of increased attenuation in the left corona radiata (2-22).    New foci of low density in the right inferior cerebellar hemisphere, suspicious for acute infarction. No CT evidence for hemorrhagic transformation.    Similar extensive white matter microvascular ischemic disease.    No displaced calvarial fracture. The visualized paranasal sinuses and mastoid air cells are clear.    IMPRESSION:  New foci of low density in the right inferior cerebellar hemisphere, suspicious for acute infarction. No CT evidence for hemorrhagic transformation.    Decreased size of left lateral convexity subdural hemorrhage. Similar size of right lateral convexity subdural hemorrhage.    Decreased size of interhemispheric subdural hemorrhage. Similar tentorial subdural hemorrhage.    Decreased rightward midline shift, currently 7 mm, previously 11 mm.    Increased ventricular size, no large hydrocephalus. Basal cisterns are more well visualized on the current examination.    < end of copied text >      < from: CT Head No Cont (20 @ 23:29) >  Bilateral supratentorial subdural hematomas are again identified. The subdural hematoma on the left side measures approximately 2.2 cm in widest diameter and on the right side measures approximately 0.5 cm in widest diameter. Acute subdural hematoma along the interhemispheric region is seen along the left side and measures approximately 0.9 cm widest diameter. Acute subdural hematomas along bilateral tentorial region are again seen as well. There is mass effect on left lateral ventricle. Left-to-right shift (1.1 cm) is again seen.    Right parietal shunt catheter is again seen. This catheter appears unchanged in position.    Evaluation of the osseous structures with the appropriate window appears normal    The visualized paranasal sinuses mastoid and middle ear regions appear clear.    IMPRESSION: No stiffing change when allowing for differences in technique.    < end of copied text >

## 2020-11-04 NOTE — PROGRESS NOTE ADULT - SUBJECTIVE AND OBJECTIVE BOX
Level I trauma.   Admitted to SICU for HD monitoring.   Patient seen and examined at bedside.       Physical Exam: Limited examen, patient intubated and sedated.   Abdomen: Soft, non-distended.   Extremities: warm and well perfused    Vital Signs Last 24 Hrs  T(C): 38 (2020 07:00), Max: 38.5 (2020 03:00)  T(F): 100.4 (2020 07:00), Max: 101.3 (2020 03:00)  HR: 83 (:) (73 - 111)  BP: 152/67 (:) (111/55 - 235/91)  BP(mean): 96 (:) (77 - 161)  RR: 25 (:) (9 - 30)  SpO2: 100% (:) (96% - 100%)    I&O's Detail    2020 07:01  -  2020 07:00  --------------------------------------------------------  IN:    Dexmedetomidine: 13.3 mL    FentaNYL: 17.1 mL    IV PiggyBack: 150 mL    IV PiggyBack: 500 mL    IV PiggyBack: 250 mL    NiCARdipine: 25 mL    sodium chloride 0.9%: 750 mL    Sodium Chloride 0.9% Bolus: 2000 mL  Total IN: 3705.4 mL    OUT:    Ureteral Catheter (mL): 655 mL  Total OUT: 655 mL    Total NET: 3050.4 mL      2020 07:01  -  2020 09:36  --------------------------------------------------------  IN:    Dexmedetomidine: 3.8 mL    IV PiggyBack: 62.5 mL    IV PiggyBack: 50 mL    sodium chloride 0.9%: 150 mL  Total IN: 266.3 mL    OUT:    Ureteral Catheter (mL): 40 mL  Total OUT: 40 mL    Total NET: 226.3 mL          11-04    138  |  111<H>  |  18  ----------------------------<  139<H>  4.7   |  18<L>  |  0.59    Ca    7.9<L>      2020 03:13  Phos  2.4     11-04  Mg     2.1     -    TPro  6.5  /  Alb  3.7  /  TBili  0.4  /  DBili  x   /  AST  31  /  ALT  38  /  AlkPhos  186<H>                              11.1   18.58 )-----------( 448      ( 2020 03:13 )             34.9       PT/INR - ( 2020 03:13 )   PT: 14.3 sec;   INR: 1.20 ratio         PTT - ( 2020 03:13 )  PTT:26.7 sec    LABS:                         11.1   18.58 )-----------( 448      ( 2020 03:13 )             34.9         138  |  111<H>  |  18  ----------------------------<  139<H>  4.7   |  18<L>  |  0.59    Ca    7.9<L>      2020 03:13  Phos  2.4     11-04  Mg     2.1     -    TPro  6.5  /  Alb  3.7  /  TBili  0.4  /  DBili  x   /  AST  31  /  ALT  38  /  AlkPhos  186<H>      PT/INR - ( 2020 03:13 )   PT: 14.3 sec;   INR: 1.20 ratio         PTT - ( 2020 03:13 )  PTT:26.7 sec  Urinalysis Basic - ( 2020 00:20 )    Color: Light Orange / Appearance: Turbid / S.029 / pH: x  Gluc: x / Ketone: Negative  / Bili: Negative / Urobili: Negative   Blood: x / Protein: Trace / Nitrite: Positive   Leuk Esterase: Large / RBC: 11 /hpf /  /HPF   Sq Epi: x / Non Sq Epi: 1 /hpf / Bacteria: Many      RADIOLOGY, EKG & ADDITIONAL TESTS: Reviewed. < from: CT Head No Cont (20 @ 23:29) >    EXAM:  CT BRAIN                            PROCEDURE DATE:  2020            INTERPRETATION:  Clinical indication: Status post fall.    Multiple axial sections were performed from base skull to vertex without contrast enhancement. Coronal and sagittal reconstructions were performed as well.    This exam is compared with prior noncontrast head CT performed earlier the same day at 9:21 PM.    Bilateral supratentorial subdural hematomas are again identified. The subdural hematoma on the left side measures approximately 2.2 cm in widest diameter and on the right side measures approximately 0.5 cm in widest diameter. Acute subdural hematoma along the interhemispheric region is seen along the left side and measures approximately 0.9 cm widest diameter. Acute subdural hematomas along bilateral tentorial region are again seen as well. There is mass effect on left lateral ventricle. Left-to-right shift (1.1 cm) is again seen.    Right parietal shunt catheter is again seen. This catheter appears unchanged in position.    Evaluation of the osseous structures with the appropriate window appears normal    The visualized paranasal sinuses mastoid and middle ear regions appear clear.    IMPRESSION: No stiffing change when allowing for differences in technique.                JUSTA HUNTER M.D., ATTENDING RADIOLOGIST  This document has been electronically signed. 2020  8:15AM    < end of copied text >  < from: CT Abdomen and Pelvis w/ IV Cont (20 @ 22:25) >  EXAM:  CT ABDOMEN AND PELVIS IC                          EXAM:  CT CHEST IC                            PROCEDURE DATE:  2020            INTERPRETATION:  CLINICAL INFORMATION: Trauma, fall.    COMPARISON: None.    PROCEDURE:  CT of the Chest, Abdomen and Pelvis was performed with intravenous contrast.  Imaging was performed through the chest in the arterial phase followed by imaging of the abdomen and pelvis in the portal venous phase.  Intravenous contrast: 90 ml Omnipaque 350. 10 ml discarded.  Oral contrast: None.  Sagittal and coronal reformats were performed.    FINDINGS:  CHEST:  LUNGS AND LARGE AIRWAYS: ET tube in place. Bilateral lower lobe dependent atelectasis.  PLEURA: Trace bilateral pleural effusions. No pneumothorax.  VESSELS: Atherosclerotic changes of the aorta.  HEART: Heart size is normal. No pericardial effusion.  MEDIASTINUM AND RODOLFO: Large hiatal hernia containing portion of the stomach and mesentery. No lymphadenopathy.  CHEST WALL AND LOWER NECK: Within normal limits.    ABDOMEN AND PELVIS:  LIVER: Indeterminate masslike region in the left hepatic lobe without arterial enhancement. This measures 8.3 x 6.0 cm and contains multiple stippled internal calcifications. Disorganized vasculature are seen along the medial border.  BILE DUCTS: Normal caliber.  GALLBLADDER: Within normal limits.  SPLEEN: Within normal limits.  PANCREAS: Within normal limits.  ADRENALS: Nodular thickening of bilateral adrenal glands.  KIDNEYS/URETERS: A 1.3 cm solid enhancing exophytic lesion in the upper pole the left kidney.. Bilateral extrarenal pelves. Parapelvic left renal cyst.    BLADDER: Minimally distended.  REPRODUCTIVE ORGANS: Myomatous uterus.    BOWEL: No bowel obstruction. Appendix is normal.  PERITONEUM: No ascites.  VESSELS: Within normal limits.  RETROPERITONEUM/LYMPH NODES: No lymphadenopathy.  ABDOMINAL WALL: Within normal limits.  BONES: Left lateral eighth, ninth, and tenth acute rib fractures. Old left posterior 10th rib fracture. Left hip arthroplasty. Degenerative changes of the spine.    IMPRESSION:  Acute fractures of the left lateral eighth, ninth, and 10th ribs.    Ill-defined masslike region of the left hepatic lobe is indeterminate. Differential includes benign and malignant etiologies aswell as possibility of a pseudolesion with heterogeneous fat deposition. Recommend MRI of the abdomen with intravenous contrast for further evaluation.    Solid left renal lesion compatible with renal cell carcinoma until proven otherwise.      < end of copied text >      MEDICATIONS  (STANDING):  cefepime   IVPB      cefepime   IVPB 1000 milliGRAM(s) IV Intermittent every 12 hours  chlorhexidine 0.12% Liquid 15 milliLiter(s) Oral Mucosa every 12 hours  chlorhexidine 2% Cloths 1 Application(s) Topical daily  levETIRAcetam  IVPB 500 milliGRAM(s) IV Intermittent every 12 hours  pantoprazole  Injectable 40 milliGRAM(s) IV Push daily  sodium chloride 0.9%. 1000 milliLiter(s) (75 mL/Hr) IV Continuous <Continuous>    MEDICATIONS  (PRN):  fentaNYL    Injectable 25 MICROGram(s) IV Push every 2 hours PRN pain

## 2020-11-04 NOTE — CONSULT NOTE ADULT - ASSESSMENT
80F PMHx NPH s/p Shunt approx 10 years ago, recent hip Fx, found down at rehab today, CT head in ED shows acute left, interhemispheric, small R SDH.  shunt ligated at clavicle bedside in SICU, repeat CT after ligation shows stable SDH. Sedation and paralytics wearing off but exam same as pre-intubation.  - Conservative management at this time  - Repeat CT head in am  - Keppra 500 BID  - Cardine/lebatalol PRN for SBP <180

## 2020-11-04 NOTE — CONSULT NOTE ADULT - SUBJECTIVE AND OBJECTIVE BOX
HISTORY  80y Female    SUBJECTIVE/ROS:  [ ] A ten-point review of systems was otherwise negative except as noted.  [x] Due to altered mental status/intubation, subjective information were not able to be obtained from the patient. History was obtained, to the extent possible, from review of the chart and collateral sources of information.      NEURO  RASS:     GCS:     CAM ICU:  Exam: awake, alert, oriented  Meds: fentaNYL   Infusion... 0.5 MICROgram(s)/kG/Hr IV Continuous <Continuous>    [x] Adequacy of sedation and pain control has been assessed and adjusted      RESPIRATORY  RR: 16 (11-03-20 @ 22:00) (16 - 28)  SpO2: 99% (11-03-20 @ 23:45) (96% - 100%)  Wt(kg): --  Exam: unlabored, clear to auscultation bilaterally  Mechanical Ventilation: Mode: AC/ CMV (Assist Control/ Continuous Mandatory Ventilation), RR (machine): 16, RR (patient): 18, TV (machine): 450, FiO2: 50, PEEP: 5, ITime: 1, MAP: 7, PIP: 16  ABG - ( 03 Nov 2020 22:08 )  pH: 7.42  /  pCO2: 34    /  pO2: 346   / HCO3: 21    / Base Excess: -2.2  /  SaO2: 100     Lactate: x                [N/A] Extubation Readiness Assessed  Meds:       CARDIOVASCULAR  HR: 80 (11-03-20 @ 23:45) (80 - 111)  BP: 217/97 (11-03-20 @ 22:00) (186/128 - 235/91)  BP(mean): 139 (11-03-20 @ 22:00) (139 - 161)  ABP: --  ABP(mean): --  Wt(kg): --  CVP(cm H2O): --      Exam: regular rate and rhythm  Cardiac Rhythm: sinus  Perfusion     [x]Adequate   [ ]Inadequate  Mentation   [x]Normal       [ ]Reduced  Extremities  [x]Warm         [ ]Cool  Volume Status [ ]Hypervolemic [x]Euvolemic [ ]Hypovolemic  Meds: labetalol Injectable 10 milliGRAM(s) IV Push once  niCARdipine Infusion 5 mG/Hr IV Continuous <Continuous>  phenylephrine    Infusion 0.4 MICROgram(s)/kG/Min IV Continuous <Continuous>        GI/NUTRITION  Exam: soft, nontender, nondistended, incision C/D/I  Diet:  Meds:     GENITOURINARY  I&O's Detail    11-03 @ 07:01  -  11-04 @ 00:42  --------------------------------------------------------  IN:    Sodium Chloride 0.9% Bolus: 1000 mL  Total IN: 1000 mL    OUT:  Total OUT: 0 mL    Total NET: 1000 mL          11-03    140  |  110<H>  |  23  ----------------------------<  129<H>  3.4<L>   |  18<L>  |  0.60    Ca    7.8<L>      03 Nov 2020 22:25  Phos  3.1     11-03  Mg     2.0     11-03    TPro  6.5  /  Alb  3.7  /  TBili  0.4  /  DBili  x   /  AST  31  /  ALT  38  /  AlkPhos  186<H>  11-03    [ ] Carrasco catheter, indication: N/A  Meds: potassium chloride  10 mEq/100 mL IVPB 10 milliEquivalent(s) IV Intermittent every 1 hour  potassium chloride  10 mEq/100 mL IVPB 10 milliEquivalent(s) IV Intermittent every 1 hour  sodium chloride 0.9%. 1000 milliLiter(s) IV Continuous <Continuous>        HEMATOLOGIC  Meds:   [x] VTE Prophylaxis                        11.1   11.61 )-----------( 392      ( 03 Nov 2020 22:25 )             34.9     PT/INR - ( 04 Nov 2020 00:20 )   PT: 14.0 sec;   INR: 1.18 ratio         PTT - ( 04 Nov 2020 00:20 )  PTT:28.2 sec  Transfusion     [ ] PRBC   [ ] Platelets   [ ] FFP   [ ] Cryoprecipitate      INFECTIOUS DISEASES  WBC Count: 11.61 K/uL (11-03 @ 22:25)  WBC Count: 12.22 K/uL (11-03 @ 21:20)    RECENT CULTURES:    Meds:       ENDOCRINE  CAPILLARY BLOOD GLUCOSE        Meds:       ACCESS DEVICES:  [ ] Peripheral IV  [ ] Central Venous Line	[ ] R	[ ] L	[ ] IJ	[ ] Fem	[ ] SC	Placed:   [ ] Arterial Line		[ ] R	[ ] L	[ ] Fem	[ ] Rad	[ ] Ax	Placed:   [ ] PICC:					[ ] Mediport  [ ] Urinary Catheter, Date Placed:   [x] Necessity of urinary, arterial, and venous catheters discussed    OTHER MEDICATIONS:      CODE STATUS:      IMAGING: HISTORY  80F hx of dementia, hydrocephalus s/p  shunt and recent hip fracture non-operative management at SNF presents from SNF after a mechanical fall, level I trauma activated, primary survey significant for GCS 8, decision was made to intubate in trauma bay. Secondary survey significant for L forehead hematoma, R lower abdomen abrasion. CT scan significant for large L SDH with midline shift and L 8-9 rib fx. NSGY and family discussed, plan for non-operative management at this time. Patient was transferred to SICU for vent management and hemodynamic monitoring.    NSGY ligated  shunt at bedside. Repeat CT head was stable. Patient was briefly started on a cardene gtt for hypertensive urgency, goal SBP < 180. UA+ started on meropenem. Plan per NSGY to continue conservative management at this time.    SUBJECTIVE/ROS:  [ ] A ten-point review of systems was otherwise negative except as noted.  [x] Due to altered mental status/intubation, subjective information were not able to be obtained from the patient. History was obtained, to the extent possible, from review of the chart and collateral sources of information.      NEURO  RASS:     GCS:     CAM ICU:  Exam: sedated  Meds: fentaNYL   Infusion... 0.5 MICROgram(s)/kG/Hr IV Continuous <Continuous>    [x] Adequacy of sedation and pain control has been assessed and adjusted      RESPIRATORY  RR: 16 (11-03-20 @ 22:00) (16 - 28)  SpO2: 99% (11-03-20 @ 23:45) (96% - 100%)  Wt(kg): --  Exam: vent  Mechanical Ventilation: Mode: AC/ CMV (Assist Control/ Continuous Mandatory Ventilation), RR (machine): 16, RR (patient): 18, TV (machine): 450, FiO2: 50, PEEP: 5, ITime: 1, MAP: 7, PIP: 16  ABG - ( 03 Nov 2020 22:08 )  pH: 7.42  /  pCO2: 34    /  pO2: 346   / HCO3: 21    / Base Excess: -2.2  /  SaO2: 100     Lactate: x                [N/A] Extubation Readiness Assessed  Meds:       CARDIOVASCULAR  HR: 80 (11-03-20 @ 23:45) (80 - 111)  BP: 217/97 (11-03-20 @ 22:00) (186/128 - 235/91)  BP(mean): 139 (11-03-20 @ 22:00) (139 - 161)  ABP: --  ABP(mean): --  Wt(kg): --  CVP(cm H2O): --      Exam: regular rate and rhythm  Cardiac Rhythm: sinus  Perfusion     [x]Adequate   [ ]Inadequate  Mentation   [x]Normal       [ ]Reduced  Extremities  [x]Warm         [ ]Cool  Volume Status [ ]Hypervolemic [x]Euvolemic [ ]Hypovolemic  Meds: labetalol Injectable 10 milliGRAM(s) IV Push once  niCARdipine Infusion 5 mG/Hr IV Continuous <Continuous>  phenylephrine    Infusion 0.4 MICROgram(s)/kG/Min IV Continuous <Continuous>        GI/NUTRITION  Exam: soft, nontender, nondistended, RLQ 5cm linear skin abrasion  Diet: NPO  Meds:     GENITOURINARY  I&O's Detail    11-03 @ 07:01  -  11-04 @ 00:42  --------------------------------------------------------  IN:    Sodium Chloride 0.9% Bolus: 1000 mL  Total IN: 1000 mL    OUT:  Total OUT: 0 mL    Total NET: 1000 mL          11-03    140  |  110<H>  |  23  ----------------------------<  129<H>  3.4<L>   |  18<L>  |  0.60    Ca    7.8<L>      03 Nov 2020 22:25  Phos  3.1     11-03  Mg     2.0     11-03    TPro  6.5  /  Alb  3.7  /  TBili  0.4  /  DBili  x   /  AST  31  /  ALT  38  /  AlkPhos  186<H>  11-03    [x] Carrasco catheter, indication: N/A  Meds: potassium chloride  10 mEq/100 mL IVPB 10 milliEquivalent(s) IV Intermittent every 1 hour  potassium chloride  10 mEq/100 mL IVPB 10 milliEquivalent(s) IV Intermittent every 1 hour  sodium chloride 0.9%. 1000 milliLiter(s) IV Continuous <Continuous>        HEMATOLOGIC  Meds:   [x] VTE Prophylaxis                        11.1   11.61 )-----------( 392      ( 03 Nov 2020 22:25 )             34.9     PT/INR - ( 04 Nov 2020 00:20 )   PT: 14.0 sec;   INR: 1.18 ratio         PTT - ( 04 Nov 2020 00:20 )  PTT:28.2 sec  Transfusion     [ ] PRBC   [ ] Platelets   [ ] FFP   [ ] Cryoprecipitate      INFECTIOUS DISEASES  WBC Count: 11.61 K/uL (11-03 @ 22:25)  WBC Count: 12.22 K/uL (11-03 @ 21:20)    RECENT CULTURES:    Meds:       ENDOCRINE  CAPILLARY BLOOD GLUCOSE        Meds:       ACCESS DEVICES:  [ ] Peripheral IV  [ ] Central Venous Line	[ ] R	[ ] L	[ ] IJ	[ ] Fem	[ ] SC	Placed:   [ ] Arterial Line		[ ] R	[ ] L	[ ] Fem	[ ] Rad	[ ] Ax	Placed:   [ ] PICC:					[ ] Mediport  [ ] Urinary Catheter, Date Placed:   [x] Necessity of urinary, arterial, and venous catheters discussed    OTHER MEDICATIONS:      CODE STATUS:      IMAGING: HISTORY  80F hx of dementia, hydrocephalus s/p  shunt and recent hip fracture non-operative management at SNF presents from SNF after a mechanical fall, level I trauma activated, primary survey significant for GCS 8, decision was made to intubate in trauma bay. Secondary survey significant for L forehead hematoma, R lower abdomen abrasion. CT scan significant for large L SDH with midline shift and L 8-10 rib fx. NSGY and family discussed, plan for non-operative management at this time. Patient was transferred to SICU for vent management and hemodynamic monitoring.    NSGY ligated  shunt at bedside. Repeat CT head was stable. Patient was briefly started on a cardene gtt for hypertensive urgency, goal SBP < 180. UA+ started on meropenem. Plan per NSGY to continue conservative management at this time.    SUBJECTIVE/ROS:  [ ] A ten-point review of systems was otherwise negative except as noted.  [x] Due to altered mental status/intubation, subjective information were not able to be obtained from the patient. History was obtained, to the extent possible, from review of the chart and collateral sources of information.      NEURO  RASS:     GCS:     CAM ICU:  Exam: sedated  Meds: fentaNYL   Infusion... 0.5 MICROgram(s)/kG/Hr IV Continuous <Continuous>    [x] Adequacy of sedation and pain control has been assessed and adjusted      RESPIRATORY  RR: 16 (11-03-20 @ 22:00) (16 - 28)  SpO2: 99% (11-03-20 @ 23:45) (96% - 100%)  Wt(kg): --  Exam: vent  Mechanical Ventilation: Mode: AC/ CMV (Assist Control/ Continuous Mandatory Ventilation), RR (machine): 16, RR (patient): 18, TV (machine): 450, FiO2: 50, PEEP: 5, ITime: 1, MAP: 7, PIP: 16  ABG - ( 03 Nov 2020 22:08 )  pH: 7.42  /  pCO2: 34    /  pO2: 346   / HCO3: 21    / Base Excess: -2.2  /  SaO2: 100     Lactate: x                [N/A] Extubation Readiness Assessed  Meds:       CARDIOVASCULAR  HR: 80 (11-03-20 @ 23:45) (80 - 111)  BP: 217/97 (11-03-20 @ 22:00) (186/128 - 235/91)  BP(mean): 139 (11-03-20 @ 22:00) (139 - 161)  ABP: --  ABP(mean): --  Wt(kg): --  CVP(cm H2O): --      Exam: regular rate and rhythm  Cardiac Rhythm: sinus  Perfusion     [x]Adequate   [ ]Inadequate  Mentation   [x]Normal       [ ]Reduced  Extremities  [x]Warm         [ ]Cool  Volume Status [ ]Hypervolemic [x]Euvolemic [ ]Hypovolemic  Meds: labetalol Injectable 10 milliGRAM(s) IV Push once  niCARdipine Infusion 5 mG/Hr IV Continuous <Continuous>  phenylephrine    Infusion 0.4 MICROgram(s)/kG/Min IV Continuous <Continuous>        GI/NUTRITION  Exam: soft, nontender, nondistended, RLQ 5cm linear skin abrasion  Diet: NPO  Meds:     GENITOURINARY  I&O's Detail    11-03 @ 07:01  -  11-04 @ 00:42  --------------------------------------------------------  IN:    Sodium Chloride 0.9% Bolus: 1000 mL  Total IN: 1000 mL    OUT:  Total OUT: 0 mL    Total NET: 1000 mL          11-03    140  |  110<H>  |  23  ----------------------------<  129<H>  3.4<L>   |  18<L>  |  0.60    Ca    7.8<L>      03 Nov 2020 22:25  Phos  3.1     11-03  Mg     2.0     11-03    TPro  6.5  /  Alb  3.7  /  TBili  0.4  /  DBili  x   /  AST  31  /  ALT  38  /  AlkPhos  186<H>  11-03    [x] Carrasco catheter, indication: N/A  Meds: potassium chloride  10 mEq/100 mL IVPB 10 milliEquivalent(s) IV Intermittent every 1 hour  potassium chloride  10 mEq/100 mL IVPB 10 milliEquivalent(s) IV Intermittent every 1 hour  sodium chloride 0.9%. 1000 milliLiter(s) IV Continuous <Continuous>        HEMATOLOGIC  Meds:   [x] VTE Prophylaxis - SCDs                        11.1   11.61 )-----------( 392      ( 03 Nov 2020 22:25 )             34.9     PT/INR - ( 04 Nov 2020 00:20 )   PT: 14.0 sec;   INR: 1.18 ratio         PTT - ( 04 Nov 2020 00:20 )  PTT:28.2 sec  Transfusion     [ ] PRBC   [ ] Platelets   [ ] FFP   [ ] Cryoprecipitate      INFECTIOUS DISEASES  WBC Count: 11.61 K/uL (11-03 @ 22:25)  WBC Count: 12.22 K/uL (11-03 @ 21:20)    RECENT CULTURES:    Meds:       ENDOCRINE  CAPILLARY BLOOD GLUCOSE        Meds:       ACCESS DEVICES:  [x] Peripheral IV  [ ] Central Venous Line	[ ] R	[ ] L	[ ] IJ	[ ] Fem	[ ] SC	Placed:   [ ] Arterial Line		[ ] R	[ ] L	[ ] Fem	[ ] Rad	[ ] Ax	Placed:   [ ] PICC:					[ ] Mediport  [x] Urinary Catheter, Date Placed: 11/3  [x] Necessity of urinary, arterial, and venous catheters discussed    OTHER MEDICATIONS:      CODE STATUS:  full code    IMAGING:    EXAM:  CT ABDOMEN AND PELVIS IC                          EXAM:  CT CHEST IC                            PROCEDURE DATE:  11/03/2020            INTERPRETATION:  CLINICAL INFORMATION: Trauma, fall.    COMPARISON: None.    PROCEDURE:  CT of the Chest, Abdomen and Pelvis was performed with intravenous contrast.  Imaging was performed through the chest in the arterial phase followed by imaging of the abdomen and pelvis in the portal venous phase.  Intravenous contrast: 90 ml Omnipaque 350. 10 ml discarded.  Oral contrast: None.  Sagittal and coronal reformats were performed.    FINDINGS:  CHEST:  LUNGS AND LARGE AIRWAYS: ET tube in place. Bilateral lower lobe dependent atelectasis.  PLEURA: Trace bilateral pleural effusions. No pneumothorax.  VESSELS: Atherosclerotic changes of the aorta.  HEART: Heart size is normal. No pericardial effusion.  MEDIASTINUM AND RODOLFO: Large hiatal hernia containing portion of the stomach and mesentery. No lymphadenopathy.  CHEST WALL AND LOWER NECK: Within normal limits.    ABDOMEN AND PELVIS:  LIVER: Indeterminate masslike region in the left hepatic lobe without arterial enhancement. This measures 8.3 x 6.0 cm and contains multiple stippled internal calcifications. Disorganized vasculature are seen along the medial border.  BILE DUCTS: Normal caliber.  GALLBLADDER: Within normal limits.  SPLEEN: Within normal limits.  PANCREAS: Within normal limits.  ADRENALS: Nodular thickening of bilateral adrenal glands.  KIDNEYS/URETERS: A 1.3 cm solid enhancing exophytic lesion in the upper pole the left kidney.. Bilateral extrarenal pelves. Parapelvic left renal cyst.    BLADDER: Minimally distended.  REPRODUCTIVE ORGANS: Myomatous uterus.    BOWEL: No bowel obstruction. Appendix is normal.  PERITONEUM: No ascites.  VESSELS: Within normal limits.  RETROPERITONEUM/LYMPH NODES: No lymphadenopathy.  ABDOMINAL WALL: Within normal limits.  BONES: Left lateral eighth, ninth, and tenth acute rib fractures. Old left posterior 10th rib fracture. Left hip arthroplasty. Degenerative changes of the spine.    IMPRESSION:  Acute fractures of the left lateral eighth, ninth, and 10th ribs.    Ill-defined masslike region of the left hepatic lobe is indeterminate. Differential includes benign and malignant etiologies as well as possibility of a pseudolesion with heterogeneous fat deposition. Recommend MRI of the abdomen with intravenous contrast for further evaluation.    Solid left renal lesion compatible with renal cell carcinoma until proven otherwise.      YOHANA THORNTON M.D., RADIOLOGY RESIDENT  This document has been electronically signed.  YOLI VASQUEZ MD; Attending Radiologist  This document has been electronically signed. Nov  3 2020 11:17PM          EXAM:  CT CERVICAL SPINE                          EXAM:  CT BRAIN                            PROCEDURE DATE:  11/03/2020      INTERPRETATION:  CLINICAL INFORMATION: Trauma code, neck pain    TECHNIQUE:  Noncontrast CT scan of the head and cervical spine was performed.  Thin section axial images were obtained through the cervical spine.  Sagittal and coronal reformations were created.    COMPARISON: No similar prior comparisons available.    FINDINGS:    BRAIN:  PARENCHYMA: There is 1.0 cm rightward midline shift. The ventricles and sulci are unremarkable in size for age. There is patchy periventricular white matter hypoattenuation, which is nonspecific but likely the sequela of chronic microvascular-type change.  VENTRICLES: A right ventriculostomy catheter is in place with tip in the right frontal horn. No intraventricular hemorrhage is identified.  EXTRA-AXIAL: Acute left holohemispheric subdural hemorrhage measures up to 1.8 cm in greatest transverse dimension. Additional acute right parietotemporal subdural hemorrhage measures up to 8.7 mm in greatest transverse dimension. Acute subdural hematoma bilaterally along the falx. Subdural hemorrhage extends inferiorly along the left side of the medulla measuring up to 1.7 cm.  PARANASAL SINUSES: Mild left ethmoid air cell mucosal thickening.  TYMPANOMASTOID CAVITIES: Within normal limits.  ORBITS: Within normal limits.  BONES/SOFT TISSUES: Left frontal convexity soft tissue swelling and scalp laceration. No acute calvarial fracture is identified.    CERVICAL SPINE:  ALIGNMENT: Reversal of the cervical lordosis. Grade 1 anterolisthesis of C3 on C4. Grade 1 retrolisthesis of C5 on C6.  BONES: No acute fracture. The craniocervical junction is unremarkable.  DISC: Multilevel intervertebral disc degeneration, marginal osteophytes, and uncovertebral and facet joint hypertrophy results in varying degrees of spinal canal and neural foraminal narrowing, most notable for severe right neural foraminal narrowing at C4/C5. Evaluation of the thecal contents is limited by nature of the CT exam.  NECK SOFT TISSUES: Partially imaged endotracheal tube. The surrounding structures of the neck are unremarkable. No prevertebral soft tissue swelling. The thyroid gland appears normal.  LUNGS: Visualized portions of the lung are clear.    IMPRESSION:    Head CT: Acute left holohemispheric subdural hemorrhage and right parietotemporal subdural hemorrhage. Subdural hemorrhage extends along the falx and left side of the medulla. There is associated 1.2 cm rightward midline shift.    Cervical spine CT: No acute fracture. Cervical degenerative spondylosis, as described above.    These findings were discussed with DAYSI Cervantes at 11/3/2020 10:20 PM by Dr. Oliva.      CHRISTIANO OLIVA MD; Resident Interventional Radiology  This document has been electronically signed.  YOLI VASQUEZ MD; Attending Radiologist  This document has been electronically signed. Nov 4 2020 12:03AM

## 2020-11-04 NOTE — PATIENT PROFILE ADULT - NSPROGENSOURCEINFO_GEN_A_NUR
pt intubated, obtunded, not following commands, unable to fill out patient profile, no family at bedside at this time patient/family/pt intubated, obtunded, not following commands, unable to fill out patient profile, no family at bedside at this time

## 2020-11-04 NOTE — PROGRESS NOTE ADULT - ASSESSMENT
80 year old female, with a medical history significant for dementia, not on A/C, BIBEMS unresponsive with head injury, reportedly fell, intubated in the ED to protect airway (GS=8), imaging tudies showed left acute SDH, repeat CT after  shunt ligation shows stable SDH and left to right shift.       Plan:     - Pain control   - Conservative management and f/u repeat head CT  - Fentanyl gtt  - Appreciate neurosurgery:  Conservative management. Keppra, Cardine/ lebatalol PRN for SBP <180.  - NPO   - Tertiary survey in 24 hours   - Appreciate SICU care.    ATP [id number]

## 2020-11-04 NOTE — CONSULT NOTE ADULT - ATTENDING COMMENTS
- Level 1 trauma activation, fall, found down in nursing home.  - N large L SDH. NSY ligated VPS. No plan for operative management currently. Keppra ppx. Sedation/pain, fentanyl gtt. BP goal 120-180 SBP. Initially heprtensive with SBP over 200, now trending down. Repeat CT in am.  - C Off pressors currently. A line for close monitoring.  - P intubated in ER, on minimal vent settings, monitor.  - GI NPO, stomach decompression. Liver mass to f/u as outpatient.  - H Holding chemical DVT ppx.  - ID UTI, started meropenem.  - R Monitor, renal mass, f/u as outpatient.  - E Monitor glycemia.  MSK L 8-10 rib fractures. Pain management. Monitor. Recent L hip fx, non operative management. Ortho consult.  I discussed this findings, with her son. He mentioned that currently they are not interested in surgical management and he wants to see how she evolves on medical management for further decisions. Currently full code. - Level 1 trauma activation, fall, found down in nursing home.  - N large L SDH. NSY ligated VPS. No plan for operative management currently. Keppra ppx. Sedation/pain, fentanyl gtt. BP goal 120-180 SBP. Initially hypertensive with SBP over 200, now trending down. Repeat CT in am. History of dementia and hydrocephalus.  - C Off pressors currently. A line for close monitoring.  - P intubated in ER, on minimal vent settings, monitor.  - GI NPO, stomach decompression. Liver mass to f/u as outpatient.  - H Holding chemical DVT ppx.  - ID UTI, started meropenem.  - R Monitor, renal mass, f/u as outpatient.  - E Monitor glycemia.  MSK L 8-10 rib fractures. Pain management. Monitor. Recent L hip fx, non operative management. Ortho consult.  I discussed this findings, with her son. He mentioned that currently they are not interested in surgical management and he wants to see how she evolves on medical management for further decisions. Currently full code.

## 2020-11-04 NOTE — CONSULT NOTE ADULT - ATTENDING COMMENTS
patient seen and examined in ICU.   Briefly, this is an 79 yo  F with dementia, hydrocephalus s/p VPS admitted after fall at nursing facility with LOC and unresponsiveness.  Initial CTH with acute SDH L>R and on interhemispheric ffissure and tentorial leaves with rightward shift 11mm decreased to 7mm. repeat CTH with new R inferior cerebellar hypodensity concerning for infarct.   o/e intubated on UE restraints, no verbal output, not following, moves UE 3/5  - unable to be placed on antiplatelets given SDH. consider when cleared from nsx standpoint.  - lipitor 40mg daily  - consider changing cefepime to alternative abx as can lower seizure threshold  - okay for keppra 500mg BID x 7 days.   - rEEG  - CTA H/N vs MRA H/N, alternatively can also obtain carotid dopplers if in agreement with family wishes.   - neurosurgery appreciated - conservative management    - MRI brain w/o when stable if in agreement with family wishes   - Hemoglobin A1c and lipid panel  - TTE  - telemetry  - PT/OT/SS/SLP, OOBC  - -160  - check FS, glucose control <180  - GI/DVT ppx  - Counseling on diet, exercise, and medication adherence was done  - Counseling on smoking cessation and alcohol consumption offered when appropriate.  - Pain assessed and judicious use of narcotics when appropriate was discussed.    - Stroke education given when appropriate.  - Importance of fall prevention discussed.   - Differential diagnosis and plan of care discussed with patient and/or family and primary team  - Thank you for allowing me to participate in the care of this patient. Call with questions.   - GOC discussion with family, poor prognosis. DNR   Ricki Mcmanus MD  Vascular Neurology  Office: [phone]

## 2020-11-04 NOTE — PATIENT PROFILE ADULT - NS PRO AD PATIENT TYPE
Medical Orders for Life-Sustaining Treatment (MOLST) Do Not Resuscitate (DNR)/Medical Orders for Life-Sustaining Treatment (MOLST)

## 2020-11-04 NOTE — CONSULT NOTE ADULT - SUBJECTIVE AND OBJECTIVE BOX
p ([id number])     HPI:  HPI: This is an 80 year old female, with a medical history significant for dementia, not on A/C, BIBEMS unresponsive with head injury, reportedly fell, no further history available.   In the trauma bay, she was intubated for level of consciousness to protect airway, GCS was 8, level I trauma activated. In addition, primary survey significant for  mm Hg. After ETT placement confirmed and primary/secondary survey completed, significant for 1 cm laceration in left side of forehead and abrasion in RLQ of abdomen. She was taken to CT scan where acute subdural noted on rapid review of CTH was seen, no obvious traumatic pathology appreciated.     Primary survey   Airway: protected with ETT  breathing: b/l breath sounds   Circulation: -220, strong pulses in 4 extremities   Disability: GCS 8 before intubation   Exposure: obtained     secondary survey   General: Patient shallow breathing, no response to verbal stimuli, significantly hypertensive, saturation is 99% on NRB   HEENT: c-collar present, L forehead hematoma and laceration, pupils b/l 2 mm reactive   Cardiac: RRR,  strong peripheral pulses  Respiratory: shallow respirations  GI: abdomen soft, ND, NTTP   Pelvis: stable   Neuro: limited exam, spontaneously moving bilateral upper extremities, withdrawing lower extremities to pain  MSK: no appreciated extremity deformity    UTO medical and surgical history from the patient, as per record from nursing home, the patient has dementia, hydrocephalus and has a  shunt, acetabular fx    (03 Nov 2020 22:27)      Imaging:    Exam: before intubation: EO spon, pupils 3R bilaterally, spontaneous and purposeful BUE, brisk w/d BLE    --Anticoagulation:    =====================  PAST MEDICAL HISTORY     PAST SURGICAL HISTORY         MEDICATIONS:  Antibiotics:    Neuro:  fentaNYL   Infusion... 0.5 MICROgram(s)/kG/Hr IV Continuous <Continuous>    Other:  labetalol Injectable 10 milliGRAM(s) IV Push once  niCARdipine Infusion 5 mG/Hr IV Continuous <Continuous>  phenylephrine    Infusion 0.4 MICROgram(s)/kG/Min IV Continuous <Continuous>  potassium chloride  10 mEq/100 mL IVPB 10 milliEquivalent(s) IV Intermittent every 1 hour  potassium chloride  10 mEq/100 mL IVPB 10 milliEquivalent(s) IV Intermittent every 1 hour  sodium chloride 0.9%. 1000 milliLiter(s) IV Continuous <Continuous>      SOCIAL HISTORY:   Occupation:   Marital Status:     FAMILY HISTORY:      ROS: Negative except per HPI    LABS:  PT/INR - ( 03 Nov 2020 22:25 )   PT: 14.2 sec;   INR: 1.19 ratio         PTT - ( 03 Nov 2020 22:25 )  PTT:26.5 sec                        11.1   11.61 )-----------( 392      ( 03 Nov 2020 22:25 )             34.9     11-03    140  |  110<H>  |  23  ----------------------------<  129<H>  3.4<L>   |  18<L>  |  0.60    Ca    7.8<L>      03 Nov 2020 22:25  Phos  3.1     11-03  Mg     2.0     11-03    TPro  6.5  /  Alb  3.7  /  TBili  0.4  /  DBili  x   /  AST  31  /  ALT  38  /  AlkPhos  186<H>  11-03

## 2020-11-04 NOTE — PROGRESS NOTE ADULT - ATTENDING COMMENTS
seen and examined 11-04-20 @ 0925    intubated on mechanical vent (FiO2 = 30%, PEEP = +5)  on dexmedetomidine infusion  c-collar in place  PERRL  5 mm left forehead wound    left holohemispheric / parafalcine / tentorial SDH  right  shunt ligated by neurosurgery  -serial neuro exams  -hold chemical VTE prophylaxis pending repeat CT head schedule for today @ 1300  -very poor prognosis, so will consult palliative care

## 2020-11-04 NOTE — CHART NOTE - NSCHARTNOTEFT_GEN_A_CORE
Patient admitted for SDH intubated after a fall from SNF, with following incidental findings  left hepatic lobe without arterial enhancement. This measures 8.3 x 6.0 cm and contains multiple stippled internal calcifications.  and left renal lesion.   The following findings were discussed with son Terence Estrada and patient's PMD Dr Hutchins at phone: [phone]  and reports was faxed to [phone]

## 2020-11-04 NOTE — CONSULT NOTE ADULT - ASSESSMENT
Latasha Harvey is an 80 year old female, with a medical history significant for dementia and hydrocephalus s/p  shunt was admitted after becoming unresponsive after a head injury when she reportedly fell at her nursing facility. Neurology was consulted after a repeat CT scan was noted with R cerebellar hypodensity suspicious for an infarct.    Impression: cannot rule out new infarct in a context of a subdural hematoma and subsequent vasospasm. Alternatively may be ESUS.    Recs:  [] recommendations are in line if family is pursuing an aggressive workup, GOC ongoing as per ICU team  [] would hold off on antiplatelets until cleared by neurosurgery in context of a subdural hematoma  [] MRI brain w/o contrast when patient stable  [] MRA brain w/o contrast and MRA neck w/ contrast for vasculature assessment  [] atorvastatin 40mg  [] please send for A1c and lipid panel  [] TTE with bubble study and telemetry to look for a cardiac source of embolism.   [] DVT prophylaxis when cleared by neurosurgery  [] Telemonitoring;  Neurochecks and Vital signs per unit protocol  [] blood pressure control in the setting of subdural hematomas as per unit protocol  [] BGM goals 140-180  [] PT/OT evaluation when stable    Case to be discussed and final recommendations advised upon reassessment of patient in AM with stroke neurology attending , Dr. Ramos.

## 2020-11-04 NOTE — CHART NOTE - NSCHARTNOTEFT_GEN_A_CORE
Discussed with primary team  Awaiting follow up CT scan and discussion with NSGY  Palliative care will follow up after NSGY has opportunity to discuss f/u scans with family  [phone] if acute issues

## 2020-11-05 DIAGNOSIS — Z71.89 OTHER SPECIFIED COUNSELING: ICD-10-CM

## 2020-11-05 DIAGNOSIS — S06.5X9A TRAUMATIC SUBDURAL HEMORRHAGE WITH LOSS OF CONSCIOUSNESS OF UNSPECIFIED DURATION, INITIAL ENCOUNTER: ICD-10-CM

## 2020-11-05 DIAGNOSIS — Z51.5 ENCOUNTER FOR PALLIATIVE CARE: ICD-10-CM

## 2020-11-05 LAB
A1C WITH ESTIMATED AVERAGE GLUCOSE RESULT: 5.6 % — SIGNIFICANT CHANGE UP (ref 4–5.6)
ANION GAP SERPL CALC-SCNC: 10 MMOL/L — SIGNIFICANT CHANGE UP (ref 5–17)
APTT BLD: 19.4 SEC — LOW (ref 27.5–35.5)
BUN SERPL-MCNC: 14 MG/DL — SIGNIFICANT CHANGE UP (ref 7–23)
CALCIUM SERPL-MCNC: 7.8 MG/DL — LOW (ref 8.4–10.5)
CHLORIDE SERPL-SCNC: 111 MMOL/L — HIGH (ref 96–108)
CHOLEST SERPL-MCNC: 147 MG/DL — SIGNIFICANT CHANGE UP
CO2 SERPL-SCNC: 17 MMOL/L — LOW (ref 22–31)
CREAT SERPL-MCNC: 0.63 MG/DL — SIGNIFICANT CHANGE UP (ref 0.5–1.3)
ESTIMATED AVERAGE GLUCOSE: 114 MG/DL — SIGNIFICANT CHANGE UP (ref 68–114)
GAS PNL BLDA: SIGNIFICANT CHANGE UP
GAS PNL BLDA: SIGNIFICANT CHANGE UP
GLUCOSE SERPL-MCNC: 132 MG/DL — HIGH (ref 70–99)
HCT VFR BLD CALC: 30.5 % — LOW (ref 34.5–45)
HCT VFR BLD CALC: 30.7 % — LOW (ref 34.5–45)
HDLC SERPL-MCNC: 40 MG/DL — LOW
HGB BLD-MCNC: 9.6 G/DL — LOW (ref 11.5–15.5)
HGB BLD-MCNC: 9.7 G/DL — LOW (ref 11.5–15.5)
INR BLD: 1.16 RATIO — SIGNIFICANT CHANGE UP (ref 0.88–1.16)
LIPID PNL WITH DIRECT LDL SERPL: 84 MG/DL — SIGNIFICANT CHANGE UP
MAGNESIUM SERPL-MCNC: 2.1 MG/DL — SIGNIFICANT CHANGE UP (ref 1.6–2.6)
MCHC RBC-ENTMCNC: 28 PG — SIGNIFICANT CHANGE UP (ref 27–34)
MCHC RBC-ENTMCNC: 28.1 PG — SIGNIFICANT CHANGE UP (ref 27–34)
MCHC RBC-ENTMCNC: 31.5 GM/DL — LOW (ref 32–36)
MCHC RBC-ENTMCNC: 31.6 GM/DL — LOW (ref 32–36)
MCV RBC AUTO: 88.9 FL — SIGNIFICANT CHANGE UP (ref 80–100)
MCV RBC AUTO: 89 FL — SIGNIFICANT CHANGE UP (ref 80–100)
NON HDL CHOLESTEROL: 107 MG/DL — SIGNIFICANT CHANGE UP
NRBC # BLD: 0 /100 WBCS — SIGNIFICANT CHANGE UP (ref 0–0)
NRBC # BLD: 0 /100 WBCS — SIGNIFICANT CHANGE UP (ref 0–0)
PHOSPHATE SERPL-MCNC: 2.6 MG/DL — SIGNIFICANT CHANGE UP (ref 2.5–4.5)
PLATELET # BLD AUTO: 333 K/UL — SIGNIFICANT CHANGE UP (ref 150–400)
PLATELET # BLD AUTO: 361 K/UL — SIGNIFICANT CHANGE UP (ref 150–400)
POTASSIUM SERPL-MCNC: 4.1 MMOL/L — SIGNIFICANT CHANGE UP (ref 3.5–5.3)
POTASSIUM SERPL-SCNC: 4.1 MMOL/L — SIGNIFICANT CHANGE UP (ref 3.5–5.3)
PROTHROM AB SERPL-ACNC: 13.8 SEC — HIGH (ref 10.6–13.6)
RBC # BLD: 3.43 M/UL — LOW (ref 3.8–5.2)
RBC # BLD: 3.45 M/UL — LOW (ref 3.8–5.2)
RBC # FLD: 14.8 % — HIGH (ref 10.3–14.5)
RBC # FLD: 14.8 % — HIGH (ref 10.3–14.5)
SODIUM SERPL-SCNC: 138 MMOL/L — SIGNIFICANT CHANGE UP (ref 135–145)
TRIGL SERPL-MCNC: 115 MG/DL — SIGNIFICANT CHANGE UP
WBC # BLD: 14.34 K/UL — HIGH (ref 3.8–10.5)
WBC # BLD: 14.63 K/UL — HIGH (ref 3.8–10.5)
WBC # FLD AUTO: 14.34 K/UL — HIGH (ref 3.8–10.5)
WBC # FLD AUTO: 14.63 K/UL — HIGH (ref 3.8–10.5)

## 2020-11-05 PROCEDURE — 99223 1ST HOSP IP/OBS HIGH 75: CPT | Mod: GC

## 2020-11-05 PROCEDURE — 99233 SBSQ HOSP IP/OBS HIGH 50: CPT

## 2020-11-05 PROCEDURE — 99232 SBSQ HOSP IP/OBS MODERATE 35: CPT

## 2020-11-05 PROCEDURE — 99223 1ST HOSP IP/OBS HIGH 75: CPT

## 2020-11-05 PROCEDURE — 71045 X-RAY EXAM CHEST 1 VIEW: CPT | Mod: 26,76

## 2020-11-05 RX ORDER — ATORVASTATIN CALCIUM 80 MG/1
80 TABLET, FILM COATED ORAL AT BEDTIME
Refills: 0 | Status: DISCONTINUED | OUTPATIENT
Start: 2020-11-05 | End: 2020-11-08

## 2020-11-05 RX ORDER — LIDOCAINE 4 G/100G
1 CREAM TOPICAL EVERY 24 HOURS
Refills: 0 | Status: DISCONTINUED | OUTPATIENT
Start: 2020-11-05 | End: 2020-11-11

## 2020-11-05 RX ORDER — SODIUM CHLORIDE 9 MG/ML
1000 INJECTION INTRAMUSCULAR; INTRAVENOUS; SUBCUTANEOUS
Refills: 0 | Status: DISCONTINUED | OUTPATIENT
Start: 2020-11-05 | End: 2020-11-05

## 2020-11-05 RX ORDER — LABETALOL HCL 100 MG
10 TABLET ORAL ONCE
Refills: 0 | Status: COMPLETED | OUTPATIENT
Start: 2020-11-05 | End: 2020-11-05

## 2020-11-05 RX ORDER — METOPROLOL TARTRATE 50 MG
25 TABLET ORAL EVERY 12 HOURS
Refills: 0 | Status: DISCONTINUED | OUTPATIENT
Start: 2020-11-05 | End: 2020-11-08

## 2020-11-05 RX ORDER — LABETALOL HCL 100 MG
10 TABLET ORAL EVERY 4 HOURS
Refills: 0 | Status: DISCONTINUED | OUTPATIENT
Start: 2020-11-05 | End: 2020-11-13

## 2020-11-05 RX ORDER — METOPROLOL TARTRATE 50 MG
25 TABLET ORAL ONCE
Refills: 0 | Status: COMPLETED | OUTPATIENT
Start: 2020-11-05 | End: 2020-11-05

## 2020-11-05 RX ADMIN — ATORVASTATIN CALCIUM 80 MILLIGRAM(S): 80 TABLET, FILM COATED ORAL at 22:22

## 2020-11-05 RX ADMIN — FENTANYL CITRATE 25 MICROGRAM(S): 50 INJECTION INTRAVENOUS at 06:46

## 2020-11-05 RX ADMIN — Medication 1000 MILLIGRAM(S): at 12:00

## 2020-11-05 RX ADMIN — Medication 400 MILLIGRAM(S): at 05:09

## 2020-11-05 RX ADMIN — SODIUM CHLORIDE 50 MILLILITER(S): 9 INJECTION INTRAMUSCULAR; INTRAVENOUS; SUBCUTANEOUS at 11:36

## 2020-11-05 RX ADMIN — FENTANYL CITRATE 25 MICROGRAM(S): 50 INJECTION INTRAVENOUS at 21:10

## 2020-11-05 RX ADMIN — FENTANYL CITRATE 25 MICROGRAM(S): 50 INJECTION INTRAVENOUS at 00:00

## 2020-11-05 RX ADMIN — LIDOCAINE 1 PATCH: 4 CREAM TOPICAL at 19:37

## 2020-11-05 RX ADMIN — FENTANYL CITRATE 25 MICROGRAM(S): 50 INJECTION INTRAVENOUS at 05:00

## 2020-11-05 RX ADMIN — Medication 10 MILLIGRAM(S): at 15:54

## 2020-11-05 RX ADMIN — Medication 25 MILLIGRAM(S): at 17:44

## 2020-11-05 RX ADMIN — SODIUM CHLORIDE 75 MILLILITER(S): 9 INJECTION INTRAMUSCULAR; INTRAVENOUS; SUBCUTANEOUS at 05:08

## 2020-11-05 RX ADMIN — CEFEPIME 100 MILLIGRAM(S): 1 INJECTION, POWDER, FOR SOLUTION INTRAMUSCULAR; INTRAVENOUS at 05:10

## 2020-11-05 RX ADMIN — Medication 62.5 MILLIMOLE(S): at 01:31

## 2020-11-05 RX ADMIN — PANTOPRAZOLE SODIUM 40 MILLIGRAM(S): 20 TABLET, DELAYED RELEASE ORAL at 11:35

## 2020-11-05 RX ADMIN — FENTANYL CITRATE 25 MICROGRAM(S): 50 INJECTION INTRAVENOUS at 21:30

## 2020-11-05 RX ADMIN — CEFEPIME 100 MILLIGRAM(S): 1 INJECTION, POWDER, FOR SOLUTION INTRAMUSCULAR; INTRAVENOUS at 17:22

## 2020-11-05 RX ADMIN — Medication 10 MILLIGRAM(S): at 19:35

## 2020-11-05 RX ADMIN — FENTANYL CITRATE 25 MICROGRAM(S): 50 INJECTION INTRAVENOUS at 07:01

## 2020-11-05 RX ADMIN — Medication 400 MILLIGRAM(S): at 11:35

## 2020-11-05 RX ADMIN — CHLORHEXIDINE GLUCONATE 15 MILLILITER(S): 213 SOLUTION TOPICAL at 17:23

## 2020-11-05 RX ADMIN — LEVETIRACETAM 400 MILLIGRAM(S): 250 TABLET, FILM COATED ORAL at 06:03

## 2020-11-05 RX ADMIN — FENTANYL CITRATE 25 MICROGRAM(S): 50 INJECTION INTRAVENOUS at 04:41

## 2020-11-05 RX ADMIN — CHLORHEXIDINE GLUCONATE 1 APPLICATION(S): 213 SOLUTION TOPICAL at 05:08

## 2020-11-05 RX ADMIN — CHLORHEXIDINE GLUCONATE 15 MILLILITER(S): 213 SOLUTION TOPICAL at 05:08

## 2020-11-05 RX ADMIN — Medication 10 MILLIGRAM(S): at 12:14

## 2020-11-05 RX ADMIN — LEVETIRACETAM 400 MILLIGRAM(S): 250 TABLET, FILM COATED ORAL at 17:19

## 2020-11-05 NOTE — CONSULT NOTE ADULT - ATTENDING COMMENTS
Patient seen and examined and agree with the above documentation with the following additions:   Patient with traumatic subdural but also with CT showing acute infarct. Currently intubated and neurologic status being closely monitored. Plan per nsgy is for serial CT scans and monitoring, with eventual discussions regarding options. introduced trach/peg today, will also ensure follow up related to less aggressive option after further discussion about what quality of life the patient would find acceptable. DNR in chart per discussion by primary team. MOLST per primary team, will review likely 11/6. palliative remains available for further discussions and will follow up PRN.  [phone] if acute issues

## 2020-11-05 NOTE — DIETITIAN INITIAL EVALUATION ADULT. - OTHER CALCULATIONS
The Milind State Equation (PSU) 2003b was used to calculate resting energy expenditure: 1476 (20 flower/kg)

## 2020-11-05 NOTE — PROGRESS NOTE ADULT - ASSESSMENT
80 year old female, with a medical history significant for dementia, not on A/C, BIBEMS unresponsive with head injury, reportedly fell, intubated in the ED to protect airway (GS=8), imaging studies showed left acute SDH, repeat CT after  shunt ligation shows stable SDH and left to right shift. CT head 4/11 revealed decreased subdural hematoma and midline shift 11mm-> 7 mm. New low density cereberllar focus suspicious for infarcts.       Plan:   - Pain control   - c/w intubation and mechanical ventilation  - Appreciate neurosurgery:  Conservative management. KeWinnie cleary/ lebatalol PRN for SBP <180.  - NPO   - Tertiary survey in 24 hours   - Follow up with family meeting discussing GOC.  - Appreciate SICU care.  -     ATP [phone]

## 2020-11-05 NOTE — DIETITIAN INITIAL EVALUATION ADULT. - PERTINENT MEDS FT
MEDICATIONS  (STANDING):  cefepime   IVPB      cefepime   IVPB 1000 milliGRAM(s) IV Intermittent every 12 hours  chlorhexidine 0.12% Liquid 15 milliLiter(s) Oral Mucosa every 12 hours  chlorhexidine 2% Cloths 1 Application(s) Topical daily  levETIRAcetam  IVPB 500 milliGRAM(s) IV Intermittent every 12 hours  pantoprazole  Injectable 40 milliGRAM(s) IV Push daily  sodium chloride 0.9%. 1000 milliLiter(s) (50 mL/Hr) IV Continuous <Continuous>

## 2020-11-05 NOTE — OCCUPATIONAL THERAPY INITIAL EVALUATION ADULT - PRECAUTIONS/LIMITATIONS, REHAB EVAL
fall precautions/In the trauma bay, she was intubated for level of consciousness to protect airway, GCS was 8, level I trauma activated. In addition, primary survey significant for  mm Hg. After ETT placement confirmed and primary/secondary survey completed, significant for 1 cm laceration in left side of forehead and abrasion in RLQ of abdomen. She was taken to CT scan where acute subdural noted on rapid review of CTH was seen, no obvious traumatic pathology appreciated.

## 2020-11-05 NOTE — OCCUPATIONAL THERAPY INITIAL EVALUATION ADULT - ASR WT BEARING STATUS EVAL
xray pelvis 11/4- Again noted is a left total hip arthroplasty with noncemented components in the usual position. There is old fracture deformity of the left superior and inferior pubic ramus. No acute periprosthetic fracture is identified. There is moderate to severe right hip arthrosis, unchanged. There is a dextroscoliosis of the lumbar spine with associated degenerative changes. A ventriculoperitoneal catheter tip terminates in the left lower quadrant. There is a midline pelvic catheter.; s/p ligation recent  shunt

## 2020-11-05 NOTE — DIETITIAN INITIAL EVALUATION ADULT. - REASON FOR ADMISSION
s/p fall    Per chart: "80F hx of dementia, hydrocephalus s/p  shunt and recent hip fracture non-operative management at SNF presents from SNF after a mechanical fall, level I trauma activated, primary survey significant for GCS 8, decision was made to intubate in trauma bay. Secondary survey significant for L forehead hematoma, R lower abdomen abrasion. CT scan significant for large L SDH with midline shift and L 8-10 rib fx. NSGY and family discussed, plan for non-operative management at this time. Patient was transferred to SICU for vent management and hemodynamic monitoring."

## 2020-11-05 NOTE — PROGRESS NOTE ADULT - SUBJECTIVE AND OBJECTIVE BOX
Neurology Progress Note    S: Patient seen and examined. No new events overnight. made DNR    Medication:  cefepime   IVPB      cefepime   IVPB 1000 milliGRAM(s) IV Intermittent every 12 hours  chlorhexidine 0.12% Liquid 15 milliLiter(s) Oral Mucosa every 12 hours  chlorhexidine 2% Cloths 1 Application(s) Topical daily  fentaNYL    Injectable 25 MICROGram(s) IV Push every 2 hours PRN  levETIRAcetam  IVPB 500 milliGRAM(s) IV Intermittent every 12 hours  pantoprazole  Injectable 40 milliGRAM(s) IV Push daily  sodium chloride 0.9%. 1000 milliLiter(s) IV Continuous <Continuous>      Vitals:  Vital Signs Last 24 Hrs  T(C): 36.6 (2020 11:00), Max: 37.9 (2020 19:00)  T(F): 97.9 (2020 11:00), Max: 100.2 (2020 19:00)  HR: 97 (2020 11:00) (75 - 107)  BP: 197/96 (2020 11:00) (138/63 - 198/91)  BP(mean): 136 (2020 11:00) (91 - 141)  RR: 20 (2020 11:00) (13 - 31)  SpO2: 100% (2020 11:00) (98% - 100%)      General:  Constitutional: Appears stated age, intubated.   Head: Noted forehead abrasion on the L side of the forehead.    Neurological (>12):  MS: Intubated, not following commands or responsive to vocal stimulation. Grimaces to sternal rub.     Language: Intubated.     CNs: (R = 2mm, L = 2mm) Poorly reactive. No noted blink to threat. Corneal reflex intact. Gag reflex intact. No facial asymmetry b/l.     Motor: Noted repetitive thumb to pointer tremor bilaterally at rest. Increased tone in the L LE in extension.   Withdrawal to noxious stimuli in all 4 extremities. Reduced withdrawal in the LLE.     Sensation: Withdrawal to noxious stimuli in all 4 extremities and reduced in the LLE.     Reflexes:              Biceps(C5)       BR(C6)     Triceps(C7)               Patellar(L4)    Achilles(S1)    Plantar Resp  R	3	          3	             3		        3		    2		Up   L	3	          3	             3		        unable to test due to tone	Up    Coordination: unable to test    Gait: Deferred     I personally reviewed the below data/images/labs:      CBC Full  -  ( 2020 05:26 )  WBC Count : 14.34 K/uL  RBC Count : 3.43 M/uL  Hemoglobin : 9.6 g/dL  Hematocrit : 30.5 %  Platelet Count - Automated : 333 K/uL  Mean Cell Volume : 88.9 fl  Mean Cell Hemoglobin : 28.0 pg  Mean Cell Hemoglobin Concentration : 31.5 gm/dL  Auto Neutrophil # : x  Auto Lymphocyte # : x  Auto Monocyte # : x  Auto Eosinophil # : x  Auto Basophil # : x  Auto Neutrophil % : x  Auto Lymphocyte % : x  Auto Monocyte % : x  Auto Eosinophil % : x  Auto Basophil % : x    11    138  |  111<H>  |  14  ----------------------------<  132<H>  4.1   |  17<L>  |  0.63    Ca    7.8<L>      2020 00:25  Phos  2.6       Mg     2.1         TPro  6.5  /  Alb  3.7  /  TBili  0.4  /  DBili  x   /  AST  31  /  ALT  38  /  AlkPhos  186<H>  1103    LIVER FUNCTIONS - ( 2020 21:20 )  Alb: 3.7 g/dL / Pro: 6.5 g/dL / ALK PHOS: 186 U/L / ALT: 38 U/L / AST: 31 U/L / GGT: x           PT/INR - ( 2020 00:26 )   PT: 13.8 sec;   INR: 1.16 ratio         PTT - ( 2020 00:26 )  PTT:19.4 sec  Urinalysis Basic - ( 2020 00:20 )    Color: Light Orange / Appearance: Turbid / S.029 / pH: x  Gluc: x / Ketone: Negative  / Bili: Negative / Urobili: Negative   Blood: x / Protein: Trace / Nitrite: Positive   Leuk Esterase: Large / RBC: 11 /hpf /  /HPF   Sq Epi: x / Non Sq Epi: 1 /hpf / Bacteria: Many        < from: CT Head No Cont (20 @ 13:13) >  FINDINGS:    Redemonstration of right parietal approach ventriculostomy catheter in unchanged position.    Acute left lateral convexity subdural hemorrhage measures 1.7 cm in greatest depth, decreased from 1.9 cm.    Acute right lateral convexity subdural hemorrhage measures 6 mm in greatest depth, similar to the prior examination.    Acute subdural hemorrhage in the interhemispheric fissure measures 9 mm in greatest thickness, previously measuring 11 mm    Acute subdural hemorrhage along the tentorial leaves is similar, measuring4 to 5 mm in greatest thickness on the left.    Rightward midline shift of 7 mm is decreased from 11 mm. Increased ventricular size, no large hydrocephalus. Basal cisterns are more well visualized on the current examination.    Similar nonspecific 6 mm focus of increased attenuation in the left corona radiata (2-22).    New foci of low density in the right inferior cerebellar hemisphere, suspicious for acute infarction. No CT evidence for hemorrhagic transformation.    Similar extensive white matter microvascular ischemic disease.    No displaced calvarial fracture. The visualized paranasal sinuses and mastoid air cells are clear.    IMPRESSION:  New foci of low density in the right inferior cerebellar hemisphere, suspicious for acute infarction. No CT evidence for hemorrhagic transformation.    Decreased size of left lateral convexity subdural hemorrhage. Similar size of right lateral convexity subdural hemorrhage.    Decreased size of interhemispheric subdural hemorrhage. Similar tentorial subdural hemorrhage.    Decreased rightward midline shift, currently 7 mm, previously 11 mm.    Increased ventricular size, no large hydrocephalus. Basal cisterns are more well visualized on the current examination.    < end of copied text >      < from: CT Head No Cont (20 @ 23:29) >  Bilateral supratentorial subdural hematomas are again identified. The subdural hematoma on the left side measures approximately 2.2 cm in widest diameter and on the right side measures approximately 0.5 cm in widest diameter. Acute subdural hematoma along the interhemispheric region is seen along the left side and measures approximately 0.9 cm widest diameter. Acute subdural hematomas along bilateral tentorial region are again seen as well. There is mass effect on left lateral ventricle. Left-to-right shift (1.1 cm) is again seen.    Right parietal shunt catheter is again seen. This catheter appears unchanged in position.    Evaluation of the osseous structures with the appropriate window appears normal    The visualized paranasal sinuses mastoid and middle ear regions appear clear.    IMPRESSION: No stiffing change when allowing for differences in technique.    < end of copied text >

## 2020-11-05 NOTE — DISCHARGE NOTE NURSING/CASE MANAGEMENT/SOCIAL WORK - PATIENT PORTAL LINK FT
You can access the FollowMyHealth Patient Portal offered by [facility] by registering at the following website: [email or web address]. By joining [facility]’s FollowMyHealth portal, you will also be able to view your health information using other applications (apps) compatible with our system.

## 2020-11-05 NOTE — CONSULT NOTE ADULT - PROBLEM SELECTOR RECOMMENDATION 9
Plan per neurosurgery including repeat CT, seizure prophylaxis, and pain control and sedation PRN Plan per neurosurgery including repeat CT, seizure prophylaxis, pain control and sedation PRN

## 2020-11-05 NOTE — PHYSICAL THERAPY INITIAL EVALUATION ADULT - PRECAUTIONS/LIMITATIONS, REHAB EVAL
Patient was transferred to SICU for vent management and hemodynamic monitoring. Head CT 11/4/2020: New foci of low density in the right inferior cerebellar hemisphere, suspicious for acute infarction. No CT evidence for hemorrhagic transformation. Decreased size of left lateral convexity subdural hemorrhage. Similar size of right lateral convexity subdural hemorrhage. Decreased size of interhemispheric subdural hemorrhage. Similar tentorial subdural hemorrhage. Decreased rightward midline shift, currently 7 mm, previously 11 mm. Increased ventricular size, no large hydrocephalus. +Abdomen/Pelvis/Chest CT 11/3/2020: Acute fractures of the left lateral eighth, ninth, and 10th ribs. Ill-defined masslike region of the left hepatic lobe is indeterminate. Differential includes benign and malignant etiologies as well as possibility of a pseudolesion with heterogeneous fat deposition. Solid left renal lesion compatible with renal cell carcinoma until proven otherwise. Cervical spine CT 11/3/2020: No acute fracture. Cervical degenerative spondylosis. Pelvis x-ray 11/4/2020: Again noted is a left total hip arthroplasty with noncemented components in the usual position. There is old fracture deformity of the left superior and inferior pubic ramus. No acute periprosthetic fracture is identified. There is moderate to severe right hip arthrosis, unchanged. There is a dextroscoliosis of the lumbar spine with associated degenerative changes. A ventriculoperitoneal catheter tip terminates in the left lower quadrant. There is a midline pelvic catheter. (-) CXR 11/4/2020.

## 2020-11-05 NOTE — PROGRESS NOTE ADULT - ATTENDING COMMENTS
seen and examined 11-05-20 @ 0911    intubated on mechanical vent (FiO2 = 30%, PEEP = +5)  off sedation  c-collar in place  PERRL  opens eyes and withdraws to pain  5 mm left forehead wound    left holohemispheric / parafalcine / tentorial SDH  right  shunt ligated by neurosurgery  -stable on 11/4 1305 repeat CT head  -serial neuro exams  -start chemical VTE prophylaxis today  -very poor prognosis, so f/u palliative care consult

## 2020-11-05 NOTE — OCCUPATIONAL THERAPY INITIAL EVALUATION ADULT - ADDITIONAL COMMENTS
CT Head 11/4- New foci of low density in the right inferior cerebellar hemisphere, suspicious for acute infarction. No CT evidence for hemorrhagic transformation. Decreased size of left lateral convexity subdural hemorrhage. Similar size of right lateral convexity subdural hemorrhage.  Decreased size of interhemispheric subdural hemorrhage. Similar tentorial subdural hemorrhage.  Decreased rightward midline shift, currently 7 mm, previously 11 mm. Increased ventricular size, no large hydrocephalus. Basal cisterns are more well visualized on the current examination.  xray pelvis 11/4-

## 2020-11-05 NOTE — PROGRESS NOTE ADULT - ASSESSMENT
80F hx of dementia, hydrocephalus s/p  shunt and recent hip fracture non-operative management at SNF presents from SNF after a mechanical fall, level I trauma activated, primary survey significant for GCS 8, decision was made to intubate in trauma bay. Secondary survey significant for L forehead hematoma, R lower abdomen abrasion. CT scan significant for large L SDH with midline shift and L 8-10 rib fx. NSGY and family discussed, plan for non-operative management at this time. Patient was transferred to SICU for vent management and hemodynamic monitoring.    Neuro:  - Repeat CT head with new cerebellar fracture + stable/less subdural bleeds  - Keppra 500 BID  - Off all sedation  - Fentanyl pushes for pain Q2 + Ativan for anxiety     Resp: L 8-10 rib fx  - 14/450/5/40  - F/u AM CXR    CV: Goal SBP <200  - Off cardene gtt      GI:  - NPO, pending goals of care discussion for potential TF   - Liver mass (+ renal mass) finding discussed with son     :   - Carrasco for hemodynamic monitoring     Heme:  - Hold VTE ppx  - F/u CBC, coags, TEG (sent on admission)    ID: UTI  - Cefepime for UTI     Endo:  - Monitor glucose on BMP    MSK: L hip fx  - No hip fractures of xray     Dispo:  - SICU  - DNR  - Pending full GOC after Dr Johnson speaks with son regarding CTH findings on repeat CT

## 2020-11-05 NOTE — PROVIDER CONTACT NOTE (OTHER) - BACKGROUND
Pt 80yoF, admit after mechanical fall w/ head injury@nursing home, unresponsive, found to have L subdural hemorrhage w/ midline shift and L8-10 rib fractures. PMH dementia, hydrocephalus s/p  shunt.

## 2020-11-05 NOTE — CONSULT NOTE ADULT - ASSESSMENT
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 She is intubated, CT A/P with ?new vs. confirmed evidence metastatic renal cell carcinoma. Currently with no apparent mental status off sedation.    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 She is intubated, CT A/P with ?new vs. confirmed evidence metastatic renal cell carcinoma. Currently with no apparent mental status off sedation.

## 2020-11-05 NOTE — CONSULT NOTE ADULT - PROBLEM SELECTOR RECOMMENDATION 2
Patient's son Terence stated to me on our brief discussion 11/5 that he would not want his mother to suffer, and re-affirmed that she will be DNR. He was not yet aware of tracheostomy and/or PEG placement as options in future. He was told by neurosurgery that Latasha is making some improvements and they will monitor for changes/improvements in mental status in days going forward, at which point more aggressive definitive interventions can then be discussed. He would like to wait a few more days to make further decisions regarding her care. He did share with me that he had prolonged ICU experience with his father who passed away 10 years ago from complications of CLL and was "maxed on pressors" at which point they decided as a family to withdraw care.     We will follow and are available for future GOC discussions. Patient's son Terence stated to me on our brief discussion 11/5 that he would not want his mother to suffer, and re-affirmed that she will be DNR. He was not yet aware of tracheostomy and/or PEG placement as options in future. He was told by neurosurgery that Latasha is making some improvements and they will monitor for changes/improvements in mental status in days going forward, at which point more aggressive definitive interventions can then be discussed. He would like to wait a few more days to make further decisions regarding her care. He did share with me that he had prolonged ICU experience with his father who passed away 10 years ago from complications of CLL and was "maxed on pressors" at which point they decided as a family to withdraw care.

## 2020-11-05 NOTE — OCCUPATIONAL THERAPY INITIAL EVALUATION ADULT - PERTINENT HX OF CURRENT PROBLEM, REHAB EVAL
81 yo F with a medical history significant for dementia, BIBEMS unresponsive with head injury, reportedly fell in nursing facility, no further history available.

## 2020-11-05 NOTE — PROGRESS NOTE ADULT - ASSESSMENT
80F PMHx NPH s/p Shunt approx 10 years ago, recent hip Fx, found down at rehab today, CT head in ED shows acute left, interhemispheric, small R SDH.  shunt ligated at clavicle bedside in SICU, repeat CT after ligation shows stable SDH. intubated, pupils 3R, purposeful BUE, w/d BLE  - GOC discussion pending  -INR 1.16, PTT 19.4, Hb 9.6, Plt 333., Na 138  - shunt ligated at clavicle 11/3  - Made DNR  - 12 hr repeat HCT dec heme, new small R cerebellar infarct (embolic vs hypercoag)  - keppra 500 BID  - on sedation (fent/precedex)  - ADM trauma for L rib fx  - CT also c/f metastatic RCC  - Cefepime for UTI

## 2020-11-05 NOTE — DIETITIAN INITIAL EVALUATION ADULT. - PERTINENT LABORATORY DATA
11-05 @ 05:26: Hemoglobin 9.6<L>, Hematocrit 30.5<L>  11-05 @ 00:25: Sodium 138, Potassium 4.1, Calcium 7.8<L>, Magnesium 2.1, Phosphorus 2.6, BUN 14, Creatinine 0.63, Glucose 132<H>, Hemoglobin 9.7<L>, Hematocrit 30.7<L>

## 2020-11-05 NOTE — DIETITIAN INITIAL EVALUATION ADULT. - ENTERAL
If EN is appropriate for GOC, consider initiating Jevity1.2 @ 10 ml/hr; advance by 10 ml q6 hrs to goal rate 55 ml/hr x 24 hours to provide 1320 ml formula, 1584 calories (21 flower/kg), 73 grams protein (1 gm/kg), 1065 ml free water, based on dosing wt 75.2kg.

## 2020-11-05 NOTE — PROGRESS NOTE ADULT - SUBJECTIVE AND OBJECTIVE BOX
Patient seen and examined at bedside.    --Anticoagulation--    T(C): 37.6 (11-05-20 @ 03:00), Max: 38 (11-04-20 @ 07:00)  HR: 83 (11-05-20 @ 05:00) (73 - 107)  BP: 191/84 (11-05-20 @ 05:00) (111/55 - 198/91)  RR: 17 (11-05-20 @ 05:00) (9 - 31)  SpO2: 100% (11-05-20 @ 05:00) (98% - 100%)  Wt(kg): --    Exam:  intubated, pupils 3R, purposeful BUE, w/d BLE

## 2020-11-05 NOTE — PROGRESS NOTE ADULT - ASSESSMENT
79 yo  F with dementia, hydrocephalus s/p VPS admitted after fall at nursing facility with LOC and unresponsiveness.  Initial CTH with acute SDH L>R and on interhemispheric ffissure and tentorial leaves with rightward shift 11mm decreased to 7mm. repeat CTH with new R inferior cerebellar hypodensity concerning for infarct.   o/e intubated on UE restraints, no verbal output, not following, moves UE 3/5  - unable to be placed on antiplatelets given SDH. consider when cleared from nsx standpoint.  - lipitor 40mg daily  - consider changing cefepime to alternative abx as can lower seizure threshold  - okay for keppra 500mg BID x 7 days.   - rEEG  - CTA H/N vs MRA H/N, alternatively can also obtain carotid dopplers if in agreement with family wishes.   - neurosurgery appreciated - conservative management    - MRI brain w/o when stable if in agreement with family wishes   - Hemoglobin A1c and lipid panel  - TTE  - telemetry  - PT/OT/SS/SLP, OOBC  - -160  - check FS, glucose control <180  - GI/DVT ppx  - Counseling on diet, exercise, and medication adherence was done  - Counseling on smoking cessation and alcohol consumption offered when appropriate.  - Pain assessed and judicious use of narcotics when appropriate was discussed.    - Stroke education given when appropriate.  - Importance of fall prevention discussed.   - Differential diagnosis and plan of care discussed with patient and/or family and primary team  - Thank you for allowing me to participate in the care of this patient. Call with questions.   - GOC discussion with family, poor prognosis. DNR. family meeting to be done today.   Ricki Mcmanus MD  Vascular Neurology  Office: [phone] .

## 2020-11-05 NOTE — CONSULT NOTE ADULT - PROBLEM SELECTOR RECOMMENDATION 3
We will follow pending clinical trajectory and are available for future [facility] discussions. [phone] if acute issues

## 2020-11-05 NOTE — DIETITIAN INITIAL EVALUATION ADULT. - REASON INDICATOR FOR ASSESSMENT
Nutrition Assessment warranted for length of stay on SICU  Information obtained from: medical record, communication with team. Pt intubated.

## 2020-11-05 NOTE — PROVIDER CONTACT NOTE (OTHER) - ASSESSMENT
Pt obtunded, not following commands, withdraws to pain, intubated on vent FiO2 30%, w/ VS as charted. Pt shows no nonverbal indicators of pain. Pt running NS@75ml/hr. No active bleeding noted.

## 2020-11-05 NOTE — PROGRESS NOTE ADULT - SUBJECTIVE AND OBJECTIVE BOX
SICU Daily Progress Note  =====================================================  Interval/Overnight Events:   - Repeat head CT showing decrease in size of subdural bleeds, decrease in midline shift from 11-->7mm, new cerebellar infarcts  - Discussion with family, patient made DNR  - Off all sedation, patient opens eyes and withdraws to pain but no purposeful movements, eye tracking or following commands   - Meropenem for UTI switched to cefepime b/c meropenem lowers seizure threshold     HPI: 80F hx of dementia, hydrocephalus s/p  shunt and recent hip fracture non-operative management at [facility] presents from SNF after a mechanical fall, level I trauma activated, primary survey significant for GCS 8, decision was made to intubate in trauma bay. Secondary survey significant for L forehead hematoma, R lower abdomen abrasion. CT scan significant for large L SDH with midline shift and L 8-10 rib fx. NSGY and family discussed, plan for non-operative management at this time. Patient was transferred to SICU for vent management and hemodynamic monitoring.    NSGY ligated  shunt at bedside. Repeat CT head was stable. Patient was briefly started on a cardene gtt for hypertensive urgency, goal SBP < 180. UA+ started on meropenem. Plan per NSGY to continue conservative management at this time.        MEDICATIONS:   --------------------------------------------------------------------------------------  Neurologic Medications  acetaminophen  IVPB .. 1000 milliGRAM(s) IV Intermittent every 6 hours  fentaNYL    Injectable 25 MICROGram(s) IV Push every 2 hours PRN pain  levETIRAcetam  IVPB 500 milliGRAM(s) IV Intermittent every 12 hours    Respiratory Medications    Cardiovascular Medications    Gastrointestinal Medications  pantoprazole  Injectable 40 milliGRAM(s) IV Push daily  sodium chloride 0.9%. 1000 milliLiter(s) IV Continuous <Continuous>    Genitourinary Medications    Hematologic/Oncologic Medications    Antimicrobial/Immunologic Medications  cefepime   IVPB      cefepime   IVPB 1000 milliGRAM(s) IV Intermittent every 12 hours    Endocrine/Metabolic Medications    Topical/Other Medications  chlorhexidine 0.12% Liquid 15 milliLiter(s) Oral Mucosa every 12 hours  chlorhexidine 2% Cloths 1 Application(s) Topical daily    --------------------------------------------------------------------------------------    VITAL SIGNS, INS/OUTS (last 24 hours):  --------------------------------------------------------------------------------------  Vital Signs Last 24 Hrs  T(C): 37.8 (2020 23:00), Max: 38.5 (2020 03:00)  T(F): 100 (:00), Max: 101.3 (2020 03:00)  HR: 86 (:) (73 - 103)  BP: 175/97 (:00) (111/55 - 191/88)  BP(mean): 126 (:) (77 - 137)  RR: 17 () (9 - 31)  SpO2: 100% (:) (99% - 100%)  --------------------------------------------------------------------------------------    EXAM  NEUROLOGY  Exam: Withdraws to pain, can move all 4 extremities spontaneously, does not respond to command, C collar in place     RESPIRATORY  Exam: Nonlabored, CTABL, no wheezes, ronchi, or rales. Normal respiratory effort.   Mechanical Ventilation: Mode: AC/ CMV (Assist Control/ Continuous Mandatory Ventilation), RR (machine): 12, TV (machine): 400, FiO2: 30, PEEP: 5, ITime: 1, MAP: 6, PIP: 12    CARDIOVASCULAR  Exam: Normotensive, RRR, no M/R/G     GI/NUTRITION  Exam: Abdomen soft, NT/ND, no rebound or guarding.  Current Diet:  NPO    VASCULAR  Exam: Extremities warm, well-perfused. Adequate capillary refill.     HEMATOLOGIC  [x] VTE Prophylaxis:       INFECTIOUS DISEASE  Antimicrobials/Immunologic Medications:  cefepime   IVPB      cefepime   IVPB 1000 milliGRAM(s) IV Intermittent every 12 hours      Tubes/Lines/Drains  [x] Peripheral IV  [] Central Venous Line     	[] R	[] L	[] IJ	[] Fem	[] SC	Date Placed:   [] Arterial Line		[] R	[] L	[] Fem	[] Rad	[] Ax	Date Placed:   [] PICC		[] Midline		[] Mediport  [] Urinary Catheter		  [x] Necessity of urinary, arterial, and venous catheters discussed    LABS  --------------------------------------------------------------------------------------                        9.7    14.63 )-----------( 361      ( 2020 00:25 )             30.7     11-    138  |  111<H>  |  14  ----------------------------<  132<H>  4.1   |  17<L>  |  0.63    Ca    7.8<L>      2020 00:25  Phos  2.6     11-  Mg     2.1     11-    TPro  6.5  /  Alb  3.7  /  TBili  0.4  /  DBili  x   /  AST  31  /  ALT  38  /  AlkPhos  186<H>  11-03    PT/INR - ( 2020 03:13 )   PT: 14.3 sec;   INR: 1.20 ratio         PTT - ( 2020 03:13 )  PTT:26.7 sec  Urinalysis Basic - ( 2020 00:20 )    Color: Light Orange / Appearance: Turbid / S.029 / pH: x  Gluc: x / Ketone: Negative  / Bili: Negative / Urobili: Negative   Blood: x / Protein: Trace / Nitrite: Positive   Leuk Esterase: Large / RBC: 11 /hpf /  /HPF   Sq Epi: x / Non Sq Epi: 1 /hpf / Bacteria: Many      --------------------------------------------------------------------------------------

## 2020-11-05 NOTE — PHYSICAL THERAPY INITIAL EVALUATION ADULT - PERTINENT HX OF CURRENT PROBLEM, REHAB EVAL
Pt is a 80 y.o. female with 80F hx of dementia, hydrocephalus s/p  shunt and recent hip fracture non-operative management at SNF presents from SNF after a mechanical fall, level I trauma activated, primary survey significant for GCS 8, decision was made to intubate in trauma bay. Secondary survey significant for L forehead hematoma, R lower abdomen abrasion. CT scan significant for large L SDH with midline shift and L 8-10 rib fx. NSGY and family discussed, plan for non-operative management at this time. Continued below.

## 2020-11-05 NOTE — DISCHARGE NOTE NURSING/CASE MANAGEMENT/SOCIAL WORK - NSDCPEPTSTRK_GEN_ALL_CORE
Need for follow up after discharge/Stroke education booklet/Stroke support groups for patients, families, and friends/Prescribed medications/Call 911 for stroke/Risk factors for stroke/Stroke warning signs and symptoms/Signs and symptoms of stroke

## 2020-11-05 NOTE — CONSULT NOTE ADULT - SUBJECTIVE AND OBJECTIVE BOX
80 year old female, with a medical history significant for dementia and hydrocephalus s/p  shunt was admitted after becoming unresponsive after a head injury when she reportedly fell at her nursing facility. In the ED, she was intubated for level of consciousness to protect airway, GCS was 8. CT head showed acute SDH, neurosx consulted and recommended conservative mgmt with BP control and seizure ppx as well as pain control. Repeat CT showed new R cerebellar infarcts. Neuro c/s as well. CT progression showing SDH's L>R with midline shift, which as decreased from 11 mm to 7 mm on subsequent imaging. She currently is unable to speak off sedation, and is moving both upper extremities somewhat but not purposeful.   Per chart review patient was made DNR yesterday , but a full GOC is pending after Dr Johnson speaks with son regarding CTH findings on repeat CT. Palliative care consulted for support in GOC.     SUBJECTIVE AND OBJECTIVE:  INTERVAL HPI/OVERNIGHT EVENTS:    DNR on chart: Yes    Allergies: penicillin (Unknown)      MEDICATIONS  (STANDING):  cefepime   IVPB      cefepime   IVPB 1000 milliGRAM(s) IV Intermittent every 12 hours  chlorhexidine 0.12% Liquid 15 milliLiter(s) Oral Mucosa every 12 hours  chlorhexidine 2% Cloths 1 Application(s) Topical daily  levETIRAcetam  IVPB 500 milliGRAM(s) IV Intermittent every 12 hours  pantoprazole  Injectable 40 milliGRAM(s) IV Push daily  sodium chloride 0.9%. 1000 milliLiter(s) (50 mL/Hr) IV Continuous <Continuous>    MEDICATIONS  (PRN):  fentaNYL    Injectable 25 MICROGram(s) IV Push every 2 hours PRN pain      ITEMS UNCHECKED ARE NOT PRESENT    PRESENT SYMPTOMS: X[ ]Unable to obtain due to poor mentation   Source if other than patient:  [ ]Family   [ ]Team     Pain:  [ ]yes [X ]no  QOL impact -   Location -                    Aggravating factors -  Quality -  Radiation -  Timing-  Severity (0-10 scale):  Minimal acceptable level (0-10 scale):     Dyspnea:                           [ ]Mild [ ]Moderate [ ]Severe  Anxiety:                             [ ]Mild [ ]Moderate [ ]Severe  Fatigue:                             [ ]Mild [ ]Moderate [ ]Severe  Nausea:                             [ ]Mild [ ]Moderate [ ]Severe  Loss of appetite:              [ ]Mild [ ]Moderate [ ]Severe  Constipation:                    [ ]Mild [ ]Moderate [ ]Severe    CPOT:    https://www.[facility].org/getattachment/[email or web address]/Critical-Care-Pain-Observation-Tool-(CPOT)    PAIN AD Score:	  http://geriatrictoolkit.[facility]/cog/painad.pdf (Ctrl + left click to view)  cannot assess    Other Symptoms:  [ ]All other review of systems negative   cannot assess    Palliative Performance Status Version 2:   0      %      http://[facility].org/files/news/palliative_performance_scale_ppsv2.pdf    PHYSICAL EXAM:  Vital Signs Last 24 Hrs  T(C): 36.6 (2020 11:00), Max: 37.9 (2020 19:00)  T(F): 97.9 (2020 11:00), Max: 100.2 (2020 19:00)  HR: 97 (:) (75 - 107)  BP: 197/96 (:00) (138/63 - 198/91)  BP(mean): 136 (:00) (91 - 141)  RR: 20 (:00) (13 - 31)  SpO2: 100% (2020 11:00) (98% - 100%) I&O's Summary    2020 07:  -  2020 07:00  --------------------------------------------------------  IN: 2666.3 mL / OUT: 855 mL / NET: 1811.3 mL    2020 07:01  -  2020 11:58  --------------------------------------------------------  IN: 175 mL / OUT: 325 mL / NET: -150 mL       GENERAL:  [ ]Alert  [ ]Oriented x   [ ]Lethargic  [ ]Cachexia  [ ]Unarousable  [ ]Verbal  [ X]Non-Verbal  Behavioral:   [ ]Anxiety  [ ]Delirium [ ]Agitation [X ]Other nonresponsive  HEENT:  [ ]Normal   [ ]Dry mouth   [ ]ET Tube/Trach  [ ]Oral lesions  PULMONARY:   [X ]Clear [ ]Tachypnea  [ ]Audible excessive secretions   [ ]Rhonchi        [ ]Right [ ]Left [ ]Bilateral  [ ]Crackles        [ ]Right [ ]Left [ ]Bilateral  [ ]Wheezing     [ ]Right [ ]Left [ ]Bilateral  [ ]Diminished BS [ ] Right [ ]Left [ ]Bilateral  CARDIOVASCULAR:    [ ]Regular [ ]Irregular [ ]Tachy  [ ]Micah [ ]Murmur [ ]Other  GASTROINTESTINAL:  [ ]Soft  [ ]Distended   [ ]+BS  [ ]Non tender [ ]Tender  [ ]PEG [ ]OGT/ NGT   Last BM:      GENITOURINARY:  [ ]Normal [ ]Incontinent   [ ]Oliguria/Anuria   [X ][place]  MUSCULOSKELETAL:   [ ]Normal   [ ]Weakness  [ ]Bed/Wheelchair bound [ ]Edema  NEUROLOGIC:   [ ]No focal deficits  [X] Cognitive impairment  [ ] Dysphagia [ ]Dysarthria [ ] Paresis [ ]Other   SKIN:   [ ]Normal  [ ]Rash   [ ]Pressure ulcer(s) [ ]y [ ]n present on admission    CRITICAL CARE:  [ ]Shock Present  [ ]Septic [ ]Cardiogenic [ ]Neurologic [ ]Hypovolemic  [ ]Vasopressors [ ]Inotropes  [ ]Respiratory failure present [ ]Mechanical Ventilation [ ]Non-invasive ventilatory support [ ]High-Flow Mode: AC/ CMV (Assist Control/ Continuous Mandatory Ventilation), RR (machine): 12, TV (machine): 370, FiO2: 30, PEEP: 5, ITime: 0.9, MAP: 6, PIP: 19  [ ]Acute  [ ]Chronic [ ]Hypoxic  [ ]Hypercarbic [ ]Other  [ ]Other organ failure     LABS:                        9.6    14.34 )-----------( 333      ( 2020 05:26 )             30.5   11-05    138  |  111<H>  |  14  ----------------------------<  132<H>  4.1   |  17<L>  |  0.63    Ca    7.8<L>      2020 00:25  Phos  2.6     11-05  Mg     2.1     11-05    TPro  6.5  /  Alb  3.7  /  TBili  0.4  /  DBili  x   /  AST  31  /  ALT  38  /  AlkPhos  186<H>    PT/INR - ( 2020 00:26 )   PT: 13.8 sec;   INR: 1.16 ratio         PTT - ( 2020 00:26 )  PTT:19.4 sec    Urinalysis Basic - ( 2020 00:20 )    Color: Light Orange / Appearance: Turbid / S.029 / pH: x  Gluc: x / Ketone: Negative  / Bili: Negative / Urobili: Negative   Blood: x / Protein: Trace / Nitrite: Positive   Leuk Esterase: Large / RBC: 11 /hpf /  /HPF   Sq Epi: x / Non Sq Epi: 1 /hpf / Bacteria: Many      RADIOLOGY & ADDITIONAL STUDIES:  CT< from: CT Head No Cont (20 @ 13:13) >  IMPRESSION:  New foci of low density in the right inferior cerebellar hemisphere, suspicious for acute infarction. No CT evidence for hemorrhagic transformation.    Decreased size of left lateral convexity subdural hemorrhage. Similar size of right lateral convexity subdural hemorrhage.    Decreased size of interhemispheric subdural hemorrhage. Similar tentorial subdural hemorrhage.    Decreased rightward midline shift, currently 7 mm, previously 11 mm.    Increased ventricular size, no large hydrocephalus. Basal cisterns are more well visualized on the current examination.    Dr. Dinh discussed these findings with Dr. Ghosh on 2020 2:15 PM with read back.    < end of copied text >    < from: CT Head No Cont (20 @ 23:29) >  This exam is compared with prior noncontrast head CT performed earlier the same day at 9:21 PM.    Bilateral supratentorial subdural hematomas are again identified. The subdural hematoma on the left side measures approximately 2.2 cm in widest diameter and on the right side measures approximately 0.5 cm in widest diameter. Acute subdural hematoma along the interhemispheric region is seen along the left side and measures approximately 0.9 cm widest diameter. Acute subdural hematomas along bilateral tentorial region are again seen as well. There is mass effect on left lateral ventricle. Left-to-right shift (1.1 cm) is again seen.    Right parietal shunt catheter is again seen. This catheter appears unchanged in position.    Evaluation of the osseous structures with the appropriate window appears normal    The visualized paranasal sinuses mastoid and middle ear regions appear clear.    IMPRESSION: No stiffing change when allowing for differences in technique.    < end of copied text >  < from: CT Head No Cont (20 @ 22:24) >  IMPRESSION:    Head CT: Acute left holohemispheric subdural hemorrhage and right parietotemporal subdural hemorrhage. Subdural hemorrhage extends along the falx and left side of the medulla. There is associated 1.2 cm rightward midline shift.    Cervical spine CT: No acute fracture. Cervical degenerative spondylosis, as described above.    These findings were discussed with DAYSI Cervantes at 11/3/2020 10:20 PM by Dr. Collier.    < end of copied text >  < from: CT Head No Cont (20 @ 22:24) >  IMPRESSION:    Head CT: Acute left holohemispheric subdural hemorrhage and right parietotemporal subdural hemorrhage. Subdural hemorrhage extends along the falx and left side of the medulla. There is associated 1.2 cm rightward midline shift.    Cervical spine CT: No acute fracture. Cervical degenerative spondylosis, as described above.    These findings were discussed with DAYSI Cervantes at 11/3/2020 10:20 PM by Dr. Collier.    < end of copied text >  < from: CT Abdomen and Pelvis w/ IV Cont (20 @ 22:25) >  Ill-defined masslike region of the left hepatic lobe is indeterminate. Differential includes benign and malignant etiologies aswell as possibility of a pseudolesion with heterogeneous fat deposition. Recommend MRI of the abdomen with intravenous contrast for further evaluation.    Solid left renal lesion compatible with renal cell carcinoma until proven otherwise.    < end of copied text >    Protein Calorie Malnutrition Present: [ ]mild [ ]moderate [ ]severe [ ]underweight [ ]morbid obesity  https://www.andeal.org/vault/2440/web/files/ONC/Table_Clinical%20Characteristics%20to%20Document%20Malnutrition-White%20JV%20et%20al%2020.pdf    Height (cm): 162.5 (20 @ 07:00)  Weight (kg): 75.2 (20 @ 21:45)  BMI (kg/m2): 28.5 (20 @ 07:00)    [ ]PPSV2 < or = 30%  [ ]significant weight loss [ ]poor nutritional intake [ ]anasarca   Albumin, Serum: 3.7 g/dL (20 @ 21:20)   [ ]Artificial Nutrition    REFERRALS:   [ ]Chaplaincy  [ ]Hospice  [ ]Child Life  [ ]Social Work  [ ]Case management [ ]Holistic Therapy     Goals of Care Document:  ABDIRAHMAN May (20 @ 15:06)  Goals of Care Conversation:   Participants:  · Participants  Family  · Child(ho)  Benny Estrada    Conversation Discussion:  · Conversation  Diagnosis; MOLST Discussed    What Matters Most To Patient and Family:  · What matters most to patient and family  as per daughter and son, both thinks that patient should not suffer    Personal Advance Directives Treatment Guidelines:   Treatment Guidelines:  · Treatment Guidelines  DNR Order    MOLST: DNR  · Completed  2020  · Updated  2020      Electronic Signatures:  Emily Newman)   (Signed 2020 15:08)  	Authored: Goals of Care Conversation, Personal Advance Directives Treatment Guidelines  Altaf Urena)   (Signed 2020 15:17)  	Co-Signer: Goals of Care Conversation, Personal Advance Directives Treatment Guidelines    Last Updated: 2020 15:17 by Altaf Urena)         80 year old female, with a medical history significant for dementia and hydrocephalus s/p  shunt was admitted after becoming unresponsive after a head injury when she reportedly fell at her nursing facility. In the ED, she was intubated for level of consciousness to protect airway, GCS was 8. CT head showed acute SDH, neurosx consulted and recommended conservative mgmt with BP control and seizure ppx as well as pain control. Repeat CT showed new R cerebellar infarcts. Neuro c/s as well. CT progression showing SDH's L>R with midline shift, which as decreased from 11 mm to 7 mm on subsequent imaging. She currently is unable to speak off sedation, and is moving both upper extremities somewhat but not purposeful.   Per chart review patient was made DNR yesterday , but a full GOC is pending after Dr Johsnon speaks with son regarding CTH findings on repeat CT. Palliative care consulted for support in GOC.     DNR on chart: Yes    PERTINENT PM/SXH:   H/O hydrocephalus    Dementia      S/P ventriculoperitoneal shunt      FAMILY HISTORY:    ITEMS NOT CHECKED ARE NOT PRESENT    SOCIAL HISTORY:   Significant other/partner[ ]  Children[ ]  Protestant/Spirituality:  Substance hx:  [ ]   Tobacco hx:  [ ]   Alcohol hx: [ ]   Home Opioid hx:  [ ] I-Stop Reference No:  Living Situation: [ ]Home  [ ]Long term care  [ ]Rehab [ ]Other    ADVANCE DIRECTIVES:    DNR  Yes    MOLST  [ ]  Living Will  [ ]   DECISION MAKER(s):  [ ] Health Care Proxy(s)  [ ] Surrogate(s)  [ ] Guardian           Name(s): Phone Number(s):    BASELINE (I)ADL(s) (prior to admission):  [place]: [ ]Total  [ ] Moderate [ ]Dependent    Allergies: penicillin (Unknown)      MEDICATIONS  (STANDING):  cefepime   IVPB      cefepime   IVPB 1000 milliGRAM(s) IV Intermittent every 12 hours  chlorhexidine 0.12% Liquid 15 milliLiter(s) Oral Mucosa every 12 hours  chlorhexidine 2% Cloths 1 Application(s) Topical daily  levETIRAcetam  IVPB 500 milliGRAM(s) IV Intermittent every 12 hours  pantoprazole  Injectable 40 milliGRAM(s) IV Push daily  sodium chloride 0.9%. 1000 milliLiter(s) (50 mL/Hr) IV Continuous <Continuous>    MEDICATIONS  (PRN):  fentaNYL    Injectable 25 MICROGram(s) IV Push every 2 hours PRN pain      ITEMS UNCHECKED ARE NOT PRESENT    PRESENT SYMPTOMS: X[ ]Unable to obtain due to poor mentation   Source if other than patient:  [ ]Family   [ ]Team     Pain:  [ ]yes [X ]no  QOL impact -   Location -                    Aggravating factors -  Quality -  Radiation -  Timing-  Severity (0-10 scale):  Minimal acceptable level (0-10 scale):     Dyspnea:                           [ ]Mild [ ]Moderate [ ]Severe  Anxiety:                             [ ]Mild [ ]Moderate [ ]Severe  Fatigue:                             [ ]Mild [ ]Moderate [ ]Severe  Nausea:                             [ ]Mild [ ]Moderate [ ]Severe  Loss of appetite:              [ ]Mild [ ]Moderate [ ]Severe  Constipation:                    [ ]Mild [ ]Moderate [ ]Severe    CPOT:    https://www.[facility].org/getattachment/[email or web address]/Critical-Care-Pain-Observation-Tool-(CPOT)    PAIN AD Score:	  http://geriatrictoolkit.[facility]/cog/painad.pdf (Ctrl + left click to view)  cannot assess    Other Symptoms:  [ ]All other review of systems negative   cannot assess    Palliative Performance Status Version 2:   0      %      http://[facility]pc[facility].org/files/news/palliative_performance_scale_ppsv2.pdf    PHYSICAL EXAM:  Vital Signs Last 24 Hrs  T(C): 36.6 (2020 11:00), Max: 37.9 (2020 19:00)  T(F): 97.9 (2020 11:00), Max: 100.2 (2020 19:00)  HR: 97 (2020 11:00) (75 - 107)  BP: 197/96 (2020 11:00) (138/63 - 198/91)  BP(mean): 136 (2020 11:00) (91 - 141)  RR: 20 (2020 11:00) (13 - 31)  SpO2: 100% (2020 11:00) (98% - 100%) I&O's Summary    2020 07:01  -  2020 07:00  --------------------------------------------------------  IN: 2666.3 mL / OUT: 855 mL / NET: 1811.3 mL    2020 07:01  -  2020 11:58  --------------------------------------------------------  IN: 175 mL / OUT: 325 mL / NET: -150 mL       GENERAL:  [ ]Alert  [ ]Oriented x   [ ]Lethargic  [ ]Cachexia  [ ]Unarousable  [ ]Verbal  [ X]Non-Verbal  Behavioral:   [ ]Anxiety  [ ]Delirium [ ]Agitation [X ]Other nonresponsive  HEENT:  [ ]Normal   [ ]Dry mouth   [ ]ET Tube/Trach  [ ]Oral lesions  PULMONARY:   [X ]Clear [ ]Tachypnea  [ ]Audible excessive secretions   [ ]Rhonchi        [ ]Right [ ]Left [ ]Bilateral  [ ]Crackles        [ ]Right [ ]Left [ ]Bilateral  [ ]Wheezing     [ ]Right [ ]Left [ ]Bilateral  [ ]Diminished BS [ ] Right [ ]Left [ ]Bilateral  CARDIOVASCULAR:    [ ]Regular [ ]Irregular [ ]Tachy  [ ]Micah [ ]Murmur [ ]Other  GASTROINTESTINAL:  [ ]Soft  [ ]Distended   [ ]+BS  [ ]Non tender [ ]Tender  [ ]PEG [ ]OGT/ NGT   Last BM:      GENITOURINARY:  [ ]Normal [ ]Incontinent   [ ]Oliguria/Anuria   [X ][place]  MUSCULOSKELETAL:   [ ]Normal   [ ]Weakness  [ ]Bed/Wheelchair bound [ ]Edema  NEUROLOGIC:   [ ]No focal deficits  [X] Cognitive impairment  [ ] Dysphagia [ ]Dysarthria [ ] Paresis [ ]Other   SKIN:   [ ]Normal  [ ]Rash   [ ]Pressure ulcer(s) [ ]y [ ]n present on admission    CRITICAL CARE:  [ ]Shock Present  [ ]Septic [ ]Cardiogenic [ ]Neurologic [ ]Hypovolemic  [ ]Vasopressors [ ]Inotropes  [ ]Respiratory failure present [ ]Mechanical Ventilation [ ]Non-invasive ventilatory support [ ]High-Flow Mode: AC/ CMV (Assist Control/ Continuous Mandatory Ventilation), RR (machine): 12, TV (machine): 370, FiO2: 30, PEEP: 5, ITime: 0.9, MAP: 6, PIP: 19  [ ]Acute  [ ]Chronic [ ]Hypoxic  [ ]Hypercarbic [ ]Other  [ ]Other organ failure     LABS:                        9.6    14.34 )-----------( 333      ( 2020 05:26 )             30.5   11-05    138  |  111<H>  |  14  ----------------------------<  132<H>  4.1   |  17<L>  |  0.63    Ca    7.8<L>      2020 00:25  Phos  2.6       Mg     2.1         TPro  6.5  /  Alb  3.7  /  TBili  0.4  /  DBili  x   /  AST  31  /  ALT  38  /  AlkPhos  186<H>  1103  PT/INR - ( 2020 00:26 )   PT: 13.8 sec;   INR: 1.16 ratio         PTT - ( 2020 00:26 )  PTT:19.4 sec    Urinalysis Basic - ( 2020 00:20 )    Color: Light Orange / Appearance: Turbid / S.029 / pH: x  Gluc: x / Ketone: Negative  / Bili: Negative / Urobili: Negative   Blood: x / Protein: Trace / Nitrite: Positive   Leuk Esterase: Large / RBC: 11 /hpf /  /HPF   Sq Epi: x / Non Sq Epi: 1 /hpf / Bacteria: Many      RADIOLOGY & ADDITIONAL STUDIES:  CT< from: CT Head No Cont (20 @ 13:13) >  IMPRESSION:  New foci of low density in the right inferior cerebellar hemisphere, suspicious for acute infarction. No CT evidence for hemorrhagic transformation.    Decreased size of left lateral convexity subdural hemorrhage. Similar size of right lateral convexity subdural hemorrhage.    Decreased size of interhemispheric subdural hemorrhage. Similar tentorial subdural hemorrhage.    Decreased rightward midline shift, currently 7 mm, previously 11 mm.    Increased ventricular size, no large hydrocephalus. Basal cisterns are more well visualized on the current examination.    Dr. Dinh discussed these findings with Dr. Ghosh on 2020 2:15 PM with read back.    < end of copied text >    < from: CT Head No Cont (20 @ 23:29) >  This exam is compared with prior noncontrast head CT performed earlier the same day at 9:21 PM.    Bilateral supratentorial subdural hematomas are again identified. The subdural hematoma on the left side measures approximately 2.2 cm in widest diameter and on the right side measures approximately 0.5 cm in widest diameter. Acute subdural hematoma along the interhemispheric region is seen along the left side and measures approximately 0.9 cm widest diameter. Acute subdural hematomas along bilateral tentorial region are again seen as well. There is mass effect on left lateral ventricle. Left-to-right shift (1.1 cm) is again seen.    Right parietal shunt catheter is again seen. This catheter appears unchanged in position.    Evaluation of the osseous structures with the appropriate window appears normal    The visualized paranasal sinuses mastoid and middle ear regions appear clear.    IMPRESSION: No stiffing change when allowing for differences in technique.    < end of copied text >  < from: CT Head No Cont (20 @ 22:24) >  IMPRESSION:    Head CT: Acute left holohemispheric subdural hemorrhage and right parietotemporal subdural hemorrhage. Subdural hemorrhage extends along the falx and left side of the medulla. There is associated 1.2 cm rightward midline shift.    Cervical spine CT: No acute fracture. Cervical degenerative spondylosis, as described above.    These findings were discussed with DAYSI Cervantes at 11/3/2020 10:20 PM by Dr. Collier.    < end of copied text >  < from: CT Head No Cont (20 @ 22:24) >  IMPRESSION:    Head CT: Acute left holohemispheric subdural hemorrhage and right parietotemporal subdural hemorrhage. Subdural hemorrhage extends along the falx and left side of the medulla. There is associated 1.2 cm rightward midline shift.    Cervical spine CT: No acute fracture. Cervical degenerative spondylosis, as described above.    These findings were discussed with DAYSI Cervantes at 11/3/2020 10:20 PM by Dr. Collier.    < end of copied text >  < from: CT Abdomen and Pelvis w/ IV Cont (20 @ 22:25) >  Ill-defined masslike region of the left hepatic lobe is indeterminate. Differential includes benign and malignant etiologies aswell as possibility of a pseudolesion with heterogeneous fat deposition. Recommend MRI of the abdomen with intravenous contrast for further evaluation.    Solid left renal lesion compatible with renal cell carcinoma until proven otherwise.    < end of copied text >    Protein Calorie Malnutrition Present: [ ]mild [ ]moderate [ ]severe [ ]underweight [ ]morbid obesity  https://www.andeal.org/vault/2440/web/files/ONC/Table_Clinical%20Characteristics%20to%20Document%20Malnutrition-White%20JV%20et%20al%797833.pdf    Height (cm): 162.5 (20 @ 07:00)  Weight (kg): 75.2 (20 @ 21:45)  BMI (kg/m2): 28.5 (20 @ 07:00)    [ ]PPSV2 < or = 30%  [ ]significant weight loss [ ]poor nutritional intake [ ]anasarca   Albumin, Serum: 3.7 g/dL (20 @ 21:20)   [ ]Artificial Nutrition    REFERRALS:   [ ]Chaplaincy  [ ]Hospice  [ ]Child Life  [ ]Social Work  [ ]Case management [ ]Holistic Therapy     Goals of Care Document:  ABDIRAHMAN May (20 @ 15:06)  Goals of Care Conversation:   Participants:  · Participants  Family  · Child(ho)  Benny Estrada    Conversation Discussion:  · Conversation  Diagnosis; MOLST Discussed    What Matters Most To Patient and Family:  · What matters most to patient and family  as per daughter and son, both thinks that patient should not suffer    Personal Advance Directives Treatment Guidelines:   Treatment Guidelines:  · Treatment Guidelines  DNR Order    MOLST: DNR  · Completed  2020  · Updated  2020      Electronic Signatures:  Emily Newman)   (Signed 2020 15:08)  	Authored: Goals of Care Conversation, Personal Advance Directives Treatment Guidelines  Altaf Urena)   (Signed 2020 15:17)  	Co-Signer: Goals of Care Conversation, Personal Advance Directives Treatment Guidelines    Last Updated: 2020 15:17 by Altaf Urena)                       80 year old female, with a medical history significant for dementia and hydrocephalus s/p  shunt was admitted after becoming unresponsive after a head injury when she reportedly fell at her nursing facility. In the ED, she was intubated for level of consciousness to protect airway, GCS was 8. CT head showed acute SDH, neurosx consulted and recommended conservative mgmt with BP control and seizure ppx as well as pain control. Repeat CT showed new R cerebellar infarcts. Neuro c/s as well. CT progression showing SDH's L>R with midline shift, which as decreased from 11 mm to 7 mm on subsequent imaging. She currently is unable to speak off sedation, and is moving both upper extremities somewhat but not purposeful.   Per chart review patient was made DNR yesterday , but a full GOC is pending after Dr Johnson speaks with son regarding CTH findings on repeat CT. Palliative care consulted for support in GOC.     Patient is intubated and cannot provide history.     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 She had been doing well and was able to remain in her home up until recent history, however has been suffering a recent progressive decline in functional status for the last 2 years, becoming less mobile (though still ambulatory) and becoming incontinent. She lives with her son and daughter in law and they have 24 hour home health aides. Last month she sustained a fall and paramedics were called- vitals were stable and she did not go to hospital right away. However the following day a firend in medical field evaluated her and felt she should go to hospital- they went to [facility] where she was admitted and found to have hip fracture. She was d/c to rehab facility and was doing "extremely well" however on day of discharge she sustained another fall. Son has incomplete information based on what he was told by rehab facility.    He knows that she would not want to suffer, and is comfortable with DNR status. However he has been told by neurosurgery that she is making some improvement and the next few days she will be monitored for any improvements in mental status.     DNR on chart: Yes    PERTINENT PM/SXH:   H/O hydrocephalus   shunt    FAMILY HISTORY: None    ITEMS NOT CHECKED ARE NOT PRESENT    SOCIAL HISTORY:   Significant other/partner[X]  x 10 years,   of CLL  Children[X] Son and daughter. Son at bedside Terence Harvey is decision maker.  Hinduism/Spirituality: Did not ask  Substance hx:  [ ]   Tobacco hx:  [ ]   Alcohol hx: [ ] Unknown.   Home Opioid hx:  [ ] I-Stop Reference No:  Living Situation: [X]Home  [ ]Long term care  [ ]Rehab [ ]Other Came in from a rehab facility s/p hip fracture, however normally domiciled at home with son and daughter in law, 24 hour home health aides present.     ADVANCE DIRECTIVES:    DNR  Yes    MOLST  [ ]  Living Will  [ ]   DECISION MAKER(s):  [X] Health Care Proxy(s)  [ ] Surrogate(s)  [ ] Guardian           Name(s): Phone Number(s): Terence Harvey    BASELINE (I)ADL(s) (prior to admission):  [place]: [ ]Total  [ ] Moderate [ ]Dependent    Allergies: penicillin (Unknown)    MEDICATIONS  (STANDING):  cefepime   IVPB      cefepime   IVPB 1000 milliGRAM(s) IV Intermittent every 12 hours  chlorhexidine 0.12% Liquid 15 milliLiter(s) Oral Mucosa every 12 hours  chlorhexidine 2% Cloths 1 Application(s) Topical daily  levETIRAcetam  IVPB 500 milliGRAM(s) IV Intermittent every 12 hours  pantoprazole  Injectable 40 milliGRAM(s) IV Push daily  sodium chloride 0.9%. 1000 milliLiter(s) (50 mL/Hr) IV Continuous <Continuous>    MEDICATIONS  (PRN):  fentaNYL    Injectable 25 MICROGram(s) IV Push every 2 hours PRN pain      ITEMS UNCHECKED ARE NOT PRESENT    PRESENT SYMPTOMS: X[ ]Unable to obtain due to poor mentation   Source if other than patient:  [ ]Family   [ ]Team     Pain:  [ ]yes [X ]no  QOL impact -   Location -                    Aggravating factors -  Quality -  Radiation -  Timing-  Severity (0-10 scale):  Minimal acceptable level (0-10 scale):     Dyspnea:                           [ ]Mild [ ]Moderate [ ]Severe  Anxiety:                             [ ]Mild [ ]Moderate [ ]Severe  Fatigue:                             [ ]Mild [ ]Moderate [ ]Severe  Nausea:                             [ ]Mild [ ]Moderate [ ]Severe  Loss of appetite:              [ ]Mild [ ]Moderate [ ]Severe  Constipation:                    [ ]Mild [ ]Moderate [ ]Severe    CPOT:    https://www.[facility].org/getattachment/[email or web address]/Critical-Care-Pain-Observation-Tool-(CPOT)    PAIN AD Score:	  http://geriatrictoolkit.[facility]/cog/painad.pdf (Ctrl + left click to view)  cannot assess    Other Symptoms:  [ ]All other review of systems negative   cannot assess    Palliative Performance Status Version 2:   0      %      http://[facility].org/files/news/palliative_performance_scale_ppsv2.pdf    PHYSICAL EXAM:  Vital Signs Last 24 Hrs  T(C): 36.6 (2020 11:00), Max: 37.9 (2020 19:00)  T(F): 97.9 (:00), Max: 100.2 (2020 19:00)  HR: 97 (:00) (75 - 107)  BP: 197/96 (:00) (138/63 - 198/91)  BP(mean): 136 (:) (91 - 141)  RR: 20 (:00) (13 - 31)  SpO2: 100% (2020 11:00) (98% - 100%) I&O's Summary    2020 07:  -  2020 07:00  --------------------------------------------------------  IN: 2666.3 mL / OUT: 855 mL / NET: 1811.3 mL    2020 07:01  -  2020 11:58  --------------------------------------------------------  IN: 175 mL / OUT: 325 mL / NET: -150 mL       GENERAL:  [ ]Alert  [ ]Oriented x   [ ]Lethargic  [ ]Cachexia  [ ]Unarousable  [ ]Verbal  [ X]Non-Verbal  Behavioral:   [ ]Anxiety  [ ]Delirium [ ]Agitation [X ]Other nonresponsive  HEENT:  [ ]Normal   [ ]Dry mouth   [ ]ET Tube/Trach  [ ]Oral lesions  PULMONARY:   [X ]Clear [ ]Tachypnea  [ ]Audible excessive secretions   [ ]Rhonchi        [ ]Right [ ]Left [ ]Bilateral  [ ]Crackles        [ ]Right [ ]Left [ ]Bilateral  [ ]Wheezing     [ ]Right [ ]Left [ ]Bilateral  [ ]Diminished BS [ ] Right [ ]Left [ ]Bilateral  CARDIOVASCULAR:    [ ]Regular [ ]Irregular [ ]Tachy  [ ]Micah [ ]Murmur [ ]Other  GASTROINTESTINAL:  [ ]Soft  [ ]Distended   [ ]+BS  [ ]Non tender [ ]Tender  [ ]PEG [ ]OGT/ NGT   Last BM:      GENITOURINARY:  [ ]Normal [ ]Incontinent   [ ]Oliguria/Anuria   [X ][place]  MUSCULOSKELETAL:   [ ]Normal   [ ]Weakness  [ ]Bed/Wheelchair bound [ ]Edema  NEUROLOGIC:   [ ]No focal deficits  [X] Cognitive impairment  [ ] Dysphagia [ ]Dysarthria [ ] Paresis [ ]Other   SKIN:   [ ]Normal  [ ]Rash   [ ]Pressure ulcer(s) [ ]y [ ]n present on admission    CRITICAL CARE:  [ ]Shock Present  [ ]Septic [ ]Cardiogenic [ ]Neurologic [ ]Hypovolemic  [ ]Vasopressors [ ]Inotropes  [ ]Respiratory failure present [ ]Mechanical Ventilation [ ]Non-invasive ventilatory support [ ]High-Flow Mode: AC/ CMV (Assist Control/ Continuous Mandatory Ventilation), RR (machine): 12, TV (machine): 370, FiO2: 30, PEEP: 5, ITime: 0.9, MAP: 6, PIP: 19  [ ]Acute  [ ]Chronic [ ]Hypoxic  [ ]Hypercarbic [ ]Other  [ ]Other organ failure     LABS:                        9.6    14.34 )-----------( 333      ( 2020 05:26 )             30.5   11-05    138  |  111<H>  |  14  ----------------------------<  132<H>  4.1   |  17<L>  |  0.63    Ca    7.8<L>      2020 00:25  Phos  2.6     11-05  Mg     2.1     11-05    TPro  6.5  /  Alb  3.7  /  TBili  0.4  /  DBili  x   /  AST  31  /  ALT  38  /  AlkPhos  186<H>  1103  PT/INR - ( 2020 00:26 )   PT: 13.8 sec;   INR: 1.16 ratio         PTT - ( 2020 00:26 )  PTT:19.4 sec    Urinalysis Basic - ( 2020 00:20 )    Color: Light Orange / Appearance: Turbid / S.029 / pH: x  Gluc: x / Ketone: Negative  / Bili: Negative / Urobili: Negative   Blood: x / Protein: Trace / Nitrite: Positive   Leuk Esterase: Large / RBC: 11 /hpf /  /HPF   Sq Epi: x / Non Sq Epi: 1 /hpf / Bacteria: Many      RADIOLOGY & ADDITIONAL STUDIES:  CT< from: CT Head No Cont (20 @ 13:13) >  IMPRESSION:  New foci of low density in the right inferior cerebellar hemisphere, suspicious for acute infarction. No CT evidence for hemorrhagic transformation.    Decreased size of left lateral convexity subdural hemorrhage. Similar size of right lateral convexity subdural hemorrhage.    Decreased size of interhemispheric subdural hemorrhage. Similar tentorial subdural hemorrhage.    Decreased rightward midline shift, currently 7 mm, previously 11 mm.    Increased ventricular size, no large hydrocephalus. Basal cisterns are more well visualized on the current examination.    Dr. Dinh discussed these findings with Dr. Ghosh on 2020 2:15 PM with read back.    < end of copied text >    < from: CT Head No Cont (20 @ 23:29) >  This exam is compared with prior noncontrast head CT performed earlier the same day at 9:21 PM.    Bilateral supratentorial subdural hematomas are again identified. The subdural hematoma on the left side measures approximately 2.2 cm in widest diameter and on the right side measures approximately 0.5 cm in widest diameter. Acute subdural hematoma along the interhemispheric region is seen along the left side and measures approximately 0.9 cm widest diameter. Acute subdural hematomas along bilateral tentorial region are again seen as well. There is mass effect on left lateral ventricle. Left-to-right shift (1.1 cm) is again seen.    Right parietal shunt catheter is again seen. This catheter appears unchanged in position.    Evaluation of the osseous structures with the appropriate window appears normal    The visualized paranasal sinuses mastoid and middle ear regions appear clear.    IMPRESSION: No stiffing change when allowing for differences in technique.    < end of copied text >  < from: CT Head No Cont (20 @ 22:24) >  IMPRESSION:    Head CT: Acute left holohemispheric subdural hemorrhage and right parietotemporal subdural hemorrhage. Subdural hemorrhage extends along the falx and left side of the medulla. There is associated 1.2 cm rightward midline shift.    Cervical spine CT: No acute fracture. Cervical degenerative spondylosis, as described above.    These findings were discussed with DAYSI Cervantes at 11/3/2020 10:20 PM by Dr. Collier.    < end of copied text >  < from: CT Head No Cont (20 @ 22:24) >  IMPRESSION:    Head CT: Acute left holohemispheric subdural hemorrhage and right parietotemporal subdural hemorrhage. Subdural hemorrhage extends along the falx and left side of the medulla. There is associated 1.2 cm rightward midline shift.    Cervical spine CT: No acute fracture. Cervical degenerative spondylosis, as described above.    These findings were discussed with DAYSI Cervantes at 11/3/2020 10:20 PM by Dr. Collier.    < end of copied text >  < from: CT Abdomen and Pelvis w/ IV Cont (20 @ 22:25) >  Ill-defined masslike region of the left hepatic lobe is indeterminate. Differential includes benign and malignant etiologies aswell as possibility of a pseudolesion with heterogeneous fat deposition. Recommend MRI of the abdomen with intravenous contrast for further evaluation.    Solid left renal lesion compatible with renal cell carcinoma until proven otherwise.    < end of copied text >    Protein Calorie Malnutrition Present: [ ]mild [ ]moderate [ ]severe [ ]underweight [ ]morbid obesity  https://www.andeal.org/vault/2440/web/files/ONC/Table_Clinical%20Characteristics%20to%20Document%20Malnutrition-White%20JV%20et%20al%2020.pdf    Height (cm): 162.5 (20 @ 07:00)  Weight (kg): 75.2 (20 @ 21:45)  BMI (kg/m2): 28.5 (20 @ 07:00)    [ ]PPSV2 < or = 30%  [ ]significant weight loss [ ]poor nutritional intake [ ]anasarca   Albumin, Serum: 3.7 g/dL (20 @ 21:20)   [ ]Artificial Nutrition    REFERRALS:   [ ]Chaplaincy  [ ]Hospice  [ ]Child Life  [ ]Social Work  [ ]Case management [ ]Holistic Therapy     Goals of Care Document:  ABDIRAHMAN May (20 @ 15:06)  Goals of Care Conversation:   Participants:  · Participants  Family  · Child(ho)  Benny Estrada    Conversation Discussion:  · Conversation  Diagnosis; MOLST Discussed    What Matters Most To Patient and Family:  · What matters most to patient and family  as per daughter and son, both thinks that patient should not suffer    Personal Advance Directives Treatment Guidelines:   Treatment Guidelines:  · Treatment Guidelines  DNR Order    MOLST: DNR  · Completed  2020  · Updated  2020      Electronic Signatures:  Emily Newman)   (Signed 2020 15:08)  	Authored: Goals of Care Conversation, Personal Advance Directives Treatment Guidelines  Altaf Urena)   (Signed 2020 15:17)  	Co-Signer: Goals of Care Conversation, Personal Advance Directives Treatment Guidelines    Last Updated: 2020 15:17 by Altaf Urena)                       80 year old female, with a medical history significant for dementia and hydrocephalus s/p  shunt was admitted after becoming unresponsive after a head injury when she reportedly fell at her nursing facility. In the ED, she was intubated for level of consciousness to protect airway, GCS was 8. CT head showed acute SDH, neurosx consulted and recommended conservative mgmt with BP control and seizure ppx as well as pain control. Repeat CT showed new R cerebellar infarcts. Neuro c/s as well. CT progression showing SDH's L>R with midline shift, which as decreased from 11 mm to 7 mm on subsequent imaging. She currently is unable to speak off sedation, and is moving both upper extremities somewhat but not purposeful.   Per chart review patient was made DNR yesterday , but a full GOC is pending after Dr Johnson speaks with son regarding CTH findings on repeat CT. Palliative care consulted for support in GOC.     Patient is intubated and cannot provide history.     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 She had been doing well and was able to remain in her home up until recent history, however has been suffering a recent progressive decline in functional status for the last 2 years, becoming less mobile (though still ambulatory) and becoming incontinent. She lives with her son and daughter in law and they have 24 hour home health aides. Last month she sustained a fall and paramedics were called- vitals were stable and she did not go to hospital right away. However the following day a firend in medical field evaluated her and felt she should go to hospital- they went to [facility] where she was admitted and found to have hip fracture. She was d/c to rehab facility and was doing "extremely well" however on day of discharge she sustained another fall. Son has incomplete information based on what he was told by rehab facility.    He knows that she would not want to suffer, and is comfortable with DNR status. However he has been told by neurosurgery that she is making some improvement and the next few days she will be monitored for any improvements in mental status.     DNR on chart: Yes    PERTINENT PM/SXH:   H/O hydrocephalus   shunt    FAMILY HISTORY: None    ITEMS NOT CHECKED ARE NOT PRESENT    SOCIAL HISTORY:   Significant other/partner[X]  x 10 years,   of CLL  Children[X] Son and daughter. Son at bedside Terence Harvey is decision maker.  Moravian/Spirituality: Did not ask  Substance hx:  [ ]   Tobacco hx:  [ ]   Alcohol hx: [ ] Unknown.   Home Opioid hx:  [ ] I-Stop Reference No:  Living Situation: [X]Home  [ ]Long term care  [ ]Rehab [ ]Other Came in from a rehab facility s/p hip fracture, however normally domiciled at home with son and daughter in law, 24 hour home health aides present.     ADVANCE DIRECTIVES:    DNR  Yes    MOLST  [ ]  Living Will  [ ]   DECISION MAKER(s):  [X] Health Care Proxy(s)  [ ] Surrogate(s)  [ ] Guardian           Name(s): Phone Number(s): Terence Harvey    BASELINE (I)ADL(s) (prior to admission):  [place]: [ ]Total  [ x] Moderate [ ]Dependent    Allergies: penicillin (Unknown)    MEDICATIONS  (STANDING):  cefepime   IVPB      cefepime   IVPB 1000 milliGRAM(s) IV Intermittent every 12 hours  chlorhexidine 0.12% Liquid 15 milliLiter(s) Oral Mucosa every 12 hours  chlorhexidine 2% Cloths 1 Application(s) Topical daily  levETIRAcetam  IVPB 500 milliGRAM(s) IV Intermittent every 12 hours  pantoprazole  Injectable 40 milliGRAM(s) IV Push daily  sodium chloride 0.9%. 1000 milliLiter(s) (50 mL/Hr) IV Continuous <Continuous>    MEDICATIONS  (PRN):  fentaNYL    Injectable 25 MICROGram(s) IV Push every 2 hours PRN pain      ITEMS UNCHECKED ARE NOT PRESENT    PRESENT SYMPTOMS: [x ]Unable to obtain due to poor mentation   Source if other than patient:  [ ]Family   [ ]Team     Pain:  [ ]yes [X ]no  QOL impact -   Location -                    Aggravating factors -  Quality -  Radiation -  Timing-  Severity (0-10 scale):  Minimal acceptable level (0-10 scale):     Dyspnea:                           [ ]Mild [ ]Moderate [ ]Severe  Anxiety:                             [ ]Mild [ ]Moderate [ ]Severe  Fatigue:                             [ ]Mild [ ]Moderate [ ]Severe  Nausea:                             [ ]Mild [ ]Moderate [ ]Severe  Loss of appetite:              [ ]Mild [ ]Moderate [ ]Severe  Constipation:                    [ ]Mild [ ]Moderate [ ]Severe    CPOT:  0  https://www.[facility].org/getattachment/[email or web address]/Critical-Care-Pain-Observation-Tool-(CPOT)    PAIN AD Score:	  http://geriatrictoolkit.[facility]/cog/painad.pdf (Ctrl + left click to view)      Other Symptoms:  [ ]All other review of systems negative       Palliative Performance Status Version 2:   10      %      http://[facility].org/files/news/palliative_performance_scale_ppsv2.pdf    PHYSICAL EXAM:  Vital Signs Last 24 Hrs  T(C): 36.6 (2020 11:00), Max: 37.9 (2020 19:00)  T(F): 97.9 (:00), Max: 100.2 (2020 19:00)  HR: 97 (:00) (75 - 107)  BP: 197/96 (:00) (138/63 - 198/91)  BP(mean): 136 (:) (91 - 141)  RR: 20 (:) (13 - 31)  SpO2: 100% (2020 11:00) (98% - 100%) I&O's Summary    2020 07:01  -  2020 07:00  --------------------------------------------------------  IN: 2666.3 mL / OUT: 855 mL / NET: 1811.3 mL    2020 07:01  -  2020 11:58  --------------------------------------------------------  IN: 175 mL / OUT: 325 mL / NET: -150 mL       GENERAL:  [ ]Alert  [ ]Oriented x   [ ]Lethargic  [ ]Cachexia  [ ]Unarousable  [ ]Verbal  [ X]Non-Verbal  Behavioral:   [ ]Anxiety  [ ]Delirium [ ]Agitation [X ]Other nonresponsive  HEENT:  [ ]Normal   [ ]Dry mouth   [ x]ET Tube/Trach  [ ]Oral lesions  PULMONARY:   [X ]Clear [ ]Tachypnea  [ ]Audible excessive secretions   [ ]Rhonchi        [ ]Right [ ]Left [ ]Bilateral  [ ]Crackles        [ ]Right [ ]Left [ ]Bilateral  [ ]Wheezing     [ ]Right [ ]Left [ ]Bilateral  [ ]Diminished BS [ ] Right [ ]Left [ ]Bilateral  CARDIOVASCULAR:    [x ]Regular [ ]Irregular [ ]Tachy  [ ]Micah [ ]Murmur [ ]Other  GASTROINTESTINAL:  [ x]Soft  [ ]Distended   [ ]+BS  [x ]Non tender [ ]Tender  [ ]PEG [x ]OGT/ NGT   Last BM:      GENITOURINARY:  [ ]Normal [ ]Incontinent   [ ]Oliguria/Anuria   [X ][place]  MUSCULOSKELETAL:   [ ]Normal   [ ]Weakness  [ x]Bed/Wheelchair bound [ ]Edema  NEUROLOGIC:   [ ]No focal deficits  [X] Cognitive impairment  [ ] Dysphagia [ ]Dysarthria [ ] Paresis [x ]Other - not following commands  SKIN: ecchymoses, L forehead bruise  [ ]Normal  [ ]Rash   [ ]Pressure ulcer(s) [ ]y [ ]n present on admission    CRITICAL CARE:  [ ]Shock Present  [ ]Septic [ ]Cardiogenic [ ]Neurologic [ ]Hypovolemic  [ ]Vasopressors [ ]Inotropes  [x ]Respiratory failure present [ x]Mechanical Ventilation [ ]Non-invasive ventilatory support [ ]High-Flow Mode: AC/ CMV (Assist Control/ Continuous Mandatory Ventilation), RR (machine): 12, TV (machine): 370, FiO2: 30, PEEP: 5, ITime: 0.9, MAP: 6, PIP: 19  [ x]Acute  [ ]Chronic [x ]Hypoxic  [ ]Hypercarbic [ ]Other  [ ]Other organ failure     LABS:                        9.6    14.34 )-----------( 333      ( 2020 05:26 )             30.5   11-05    138  |  111<H>  |  14  ----------------------------<  132<H>  4.1   |  17<L>  |  0.63    Ca    7.8<L>      2020 00:25  Phos  2.6     11-05  Mg     2.1     11-05    TPro  6.5  /  Alb  3.7  /  TBili  0.4  /  DBili  x   /  AST  31  /  ALT  38  /  AlkPhos  186<H>    PT/INR - ( 2020 00:26 )   PT: 13.8 sec;   INR: 1.16 ratio         PTT - ( 2020 00:26 )  PTT:19.4 sec    Urinalysis Basic - ( 2020 00:20 )    Color: Light Orange / Appearance: Turbid / S.029 / pH: x  Gluc: x / Ketone: Negative  / Bili: Negative / Urobili: Negative   Blood: x / Protein: Trace / Nitrite: Positive   Leuk Esterase: Large / RBC: 11 /hpf /  /HPF   Sq Epi: x / Non Sq Epi: 1 /hpf / Bacteria: Many      RADIOLOGY & ADDITIONAL STUDIES:  CT< from: CT Head No Cont (20 @ 13:13) >  IMPRESSION:  New foci of low density in the right inferior cerebellar hemisphere, suspicious for acute infarction. No CT evidence for hemorrhagic transformation.    Decreased size of left lateral convexity subdural hemorrhage. Similar size of right lateral convexity subdural hemorrhage.    Decreased size of interhemispheric subdural hemorrhage. Similar tentorial subdural hemorrhage.    Decreased rightward midline shift, currently 7 mm, previously 11 mm.    Increased ventricular size, no large hydrocephalus. Basal cisterns are more well visualized on the current examination.    Dr. Dinh discussed these findings with Dr. Ghosh on 2020 2:15 PM with read back.    < end of copied text >    < from: CT Head No Cont (20 @ 23:29) >  This exam is compared with prior noncontrast head CT performed earlier the same day at 9:21 PM.    Bilateral supratentorial subdural hematomas are again identified. The subdural hematoma on the left side measures approximately 2.2 cm in widest diameter and on the right side measures approximately 0.5 cm in widest diameter. Acute subdural hematoma along the interhemispheric region is seen along the left side and measures approximately 0.9 cm widest diameter. Acute subdural hematomas along bilateral tentorial region are again seen as well. There is mass effect on left lateral ventricle. Left-to-right shift (1.1 cm) is again seen.    Right parietal shunt catheter is again seen. This catheter appears unchanged in position.    Evaluation of the osseous structures with the appropriate window appears normal    The visualized paranasal sinuses mastoid and middle ear regions appear clear.    IMPRESSION: No stiffing change when allowing for differences in technique.    < end of copied text >  < from: CT Head No Cont (20 @ 22:24) >  IMPRESSION:    Head CT: Acute left holohemispheric subdural hemorrhage and right parietotemporal subdural hemorrhage. Subdural hemorrhage extends along the falx and left side of the medulla. There is associated 1.2 cm rightward midline shift.    Cervical spine CT: No acute fracture. Cervical degenerative spondylosis, as described above.    These findings were discussed with DAYSI Cervantes at 11/3/2020 10:20 PM by Dr. Collier.    < end of copied text >  < from: CT Head No Cont (20 @ 22:24) >  IMPRESSION:    Head CT: Acute left holohemispheric subdural hemorrhage and right parietotemporal subdural hemorrhage. Subdural hemorrhage extends along the falx and left side of the medulla. There is associated 1.2 cm rightward midline shift.    Cervical spine CT: No acute fracture. Cervical degenerative spondylosis, as described above.    These findings were discussed with DAYSI Cervantes at 11/3/2020 10:20 PM by Dr. Collier.    < end of copied text >  < from: CT Abdomen and Pelvis w/ IV Cont (20 @ 22:25) >  Ill-defined masslike region of the left hepatic lobe is indeterminate. Differential includes benign and malignant etiologies aswell as possibility of a pseudolesion with heterogeneous fat deposition. Recommend MRI of the abdomen with intravenous contrast for further evaluation.    Solid left renal lesion compatible with renal cell carcinoma until proven otherwise.    < end of copied text >    Protein Calorie Malnutrition Present: [ ]mild [ ]moderate [ ]severe [ ]underweight [ ]morbid obesity  https://www.andeal.org/vault/2440/web/files/ONC/Table_Clinical%20Characteristics%20to%20Document%20Malnutrition-White%20JV%20et%20al%2020.pdf    Height (cm): 162.5 (20 @ 07:00)  Weight (kg): 75.2 (20 @ 21:45)  BMI (kg/m2): 28.5 (20 @ 07:00)    [x ]PPSV2 < or = 30%  [ ]significant weight loss [x ]poor nutritional intake [ ]anasarca   Albumin, Serum: 3.7 g/dL (20 @ 21:20)   [x ]Artificial Nutrition    REFERRALS:   [ ]Chaplaincy  [ ]Hospice  [ ]Child Life  [ x]Social Work  [ ]Case management [ ]Holistic Therapy     Goals of Care Document:  ABDIRAHMAN May (20 @ 15:06)  Goals of Care Conversation:   Participants:  · Participants  Family  · Child(ho)  Terence and Frank Estrada    Conversation Discussion:  · Conversation  Diagnosis; MOLST Discussed    What Matters Most To Patient and Family:  · What matters most to patient and family  as per daughter and son, both thinks that patient should not suffer    Personal Advance Directives Treatment Guidelines:   Treatment Guidelines:  · Treatment Guidelines  DNR Order    MOLST: DNR  · Completed  2020  · Updated  2020      Electronic Signatures:  Emily Newman)   (Signed 2020 15:08)  	Authored: Goals of Care Conversation, Personal Advance Directives Treatment Guidelines  Altaf Urena)   (Signed 2020 15:17)  	Co-Signer: Goals of Care Conversation, Personal Advance Directives Treatment Guidelines    Last Updated: 2020 15:17 by Altaf Urena)

## 2020-11-05 NOTE — PROGRESS NOTE ADULT - ATTENDING COMMENTS
S/p fall at home with largr lt SDH with shift, small lt SDH  F/U CT stable lt SDH with decreasing midline shift, with kash rt cerebellar infarct  Neuro exam remains poor, non communicative does not follow, h/o dementia and hydrocephalus  Continue Keppra for seizure prophylaxis  BP goal 120-180 SBP  Vent adjustment for resp alkalosis, ETT adjustment, pushed down  Start tube feed, incidental   Son made aware of incidental finding of lung and renal mass  Possible chemical DVT ppx from AM  UTI, on Cefepime, sensitivity pending  R Monitor, renal mass, f/u as outpatient.  E Monitor glycemia.  MSK L 8-10 rib fractures. Pain management. Monitor. Recent L hip fx, non operative management  Son kept updated, pt is DNR

## 2020-11-05 NOTE — PROGRESS NOTE ADULT - SUBJECTIVE AND OBJECTIVE BOX
- Repeat CT scan revealed minimal improvement of SDH and midline shift of 7 mm, previously 11mm. New cerebellar infarcts  - Patient is now DNR by her family.   - Off sedation.     Patient seen and examined at bedside.   Intubated on mechanical ventilation.   Off sedation. Opening her eyes but without purposeful movement or following commands.      Physical Exam  Chest: Intubated on mechanical ventilation.   Neuro: opens eyes on stimulation, not following commands.    Vital Signs Last 24 Hrs  T(C): 37.2 (2020 08:00), Max: 37.9 (2020 19:00)  T(F): 99 (2020 08:00), Max: 100.2 (2020 19:00)  HR: 75 (2020 08:00) (75 - 107)  BP: 170/78 (2020 08:00) (137/66 - 198/91)  BP(mean): 112 (2020 08:00) (91 - 141)  RR: 15 (2020 08:) (13 - 31)  SpO2: 100% (2020 08:00) (98% - 100%)    I&O's Detail    2020 07:01  -  2020 07:00  --------------------------------------------------------  IN:    Dexmedetomidine: 3.8 mL    IV PiggyBack: 550 mL    IV PiggyBack: 112.5 mL    IV PiggyBack: 200 mL    sodium chloride 0.9%: 1800 mL  Total IN: 2666.3 mL    OUT:    Ureteral Catheter (mL): 855 mL  Total OUT: 855 mL    Total NET: 1811.3 mL      2020 07:01  -  2020 09:03  --------------------------------------------------------  IN:    sodium chloride 0.9%: 75 mL  Total IN: 75 mL    OUT:    Ureteral Catheter (mL): 30 mL  Total OUT: 30 mL    Total NET: 45 mL              138  |  111<H>  |  14  ----------------------------<  132<H>  4.1   |  17<L>  |  0.63    Ca    7.8<L>      2020 00:25  Phos  2.6     11-05  Mg     2.1     11-05    TPro  6.5  /  Alb  3.7  /  TBili  0.4  /  DBili  x   /  AST  31  /  ALT  38  /  AlkPhos  186<H>  1103                            9.6    14.34 )-----------( 333      ( 2020 05:26 )             30.5       PT/INR - ( 2020 00:26 )   PT: 13.8 sec;   INR: 1.16 ratio         PTT - ( 2020 00:26 )  PTT:19.4 sec    LABS:                         9.6    14.34 )-----------( 333      ( 2020 05:26 )             30.5     11-05    138  |  111<H>  |  14  ----------------------------<  132<H>  4.1   |  17<L>  |  0.63    Ca    7.8<L>      2020 00:25  Phos  2.6     11-05  Mg     2.1     11-05    TPro  6.5  /  Alb  3.7  /  TBili  0.4  /  DBili  x   /  AST  31  /  ALT  38  /  AlkPhos  186<H>  1103    PT/INR - ( 2020 00:26 )   PT: 13.8 sec;   INR: 1.16 ratio         PTT - ( 2020 00:26 )  PTT:19.4 sec  Urinalysis Basic - ( 2020 00:20 )    Color: Light Orange / Appearance: Turbid / S.029 / pH: x  Gluc: x / Ketone: Negative  / Bili: Negative / Urobili: Negative   Blood: x / Protein: Trace / Nitrite: Positive   Leuk Esterase: Large / RBC: 11 /hpf /  /HPF   Sq Epi: x / Non Sq Epi: 1 /hpf / Bacteria: Many            RADIOLOGY, EKG & ADDITIONAL TESTS: Reviewed.     MEDICATIONS  (STANDING):  acetaminophen  IVPB .. 1000 milliGRAM(s) IV Intermittent every 6 hours  cefepime   IVPB      cefepime   IVPB 1000 milliGRAM(s) IV Intermittent every 12 hours  chlorhexidine 0.12% Liquid 15 milliLiter(s) Oral Mucosa every 12 hours  chlorhexidine 2% Cloths 1 Application(s) Topical daily  levETIRAcetam  IVPB 500 milliGRAM(s) IV Intermittent every 12 hours  pantoprazole  Injectable 40 milliGRAM(s) IV Push daily    MEDICATIONS  (PRN):  fentaNYL    Injectable 25 MICROGram(s) IV Push every 2 hours PRN pain

## 2020-11-06 LAB
-  AMIKACIN: SIGNIFICANT CHANGE UP
-  AMOXICILLIN/CLAVULANIC ACID: SIGNIFICANT CHANGE UP
-  AMPICILLIN/SULBACTAM: SIGNIFICANT CHANGE UP
-  AMPICILLIN: SIGNIFICANT CHANGE UP
-  AZTREONAM: SIGNIFICANT CHANGE UP
-  CEFAZOLIN: SIGNIFICANT CHANGE UP
-  CEFEPIME: SIGNIFICANT CHANGE UP
-  CEFOXITIN: SIGNIFICANT CHANGE UP
-  CEFTRIAXONE: SIGNIFICANT CHANGE UP
-  CIPROFLOXACIN: SIGNIFICANT CHANGE UP
-  ERTAPENEM: SIGNIFICANT CHANGE UP
-  GENTAMICIN: SIGNIFICANT CHANGE UP
-  IMIPENEM: SIGNIFICANT CHANGE UP
-  LEVOFLOXACIN: SIGNIFICANT CHANGE UP
-  MEROPENEM: SIGNIFICANT CHANGE UP
-  NITROFURANTOIN: SIGNIFICANT CHANGE UP
-  PIPERACILLIN/TAZOBACTAM: SIGNIFICANT CHANGE UP
-  TIGECYCLINE: SIGNIFICANT CHANGE UP
-  TOBRAMYCIN: SIGNIFICANT CHANGE UP
-  TRIMETHOPRIM/SULFAMETHOXAZOLE: SIGNIFICANT CHANGE UP
ANION GAP SERPL CALC-SCNC: 12 MMOL/L — SIGNIFICANT CHANGE UP (ref 5–17)
APTT BLD: 29.1 SEC — SIGNIFICANT CHANGE UP (ref 27.5–35.5)
BASE EXCESS BLDV CALC-SCNC: -2.3 MMOL/L — LOW (ref -2–2)
BUN SERPL-MCNC: 12 MG/DL — SIGNIFICANT CHANGE UP (ref 7–23)
CA-I SERPL-SCNC: 1.17 MMOL/L — SIGNIFICANT CHANGE UP (ref 1.12–1.3)
CALCIUM SERPL-MCNC: 8.4 MG/DL — SIGNIFICANT CHANGE UP (ref 8.4–10.5)
CHLORIDE BLDV-SCNC: 109 MMOL/L — HIGH (ref 96–108)
CHLORIDE SERPL-SCNC: 106 MMOL/L — SIGNIFICANT CHANGE UP (ref 96–108)
CO2 BLDV-SCNC: 22 MMOL/L — SIGNIFICANT CHANGE UP (ref 22–30)
CO2 SERPL-SCNC: 20 MMOL/L — LOW (ref 22–31)
CREAT SERPL-MCNC: 0.54 MG/DL — SIGNIFICANT CHANGE UP (ref 0.5–1.3)
CULTURE RESULTS: SIGNIFICANT CHANGE UP
GAS PNL BLDA: SIGNIFICANT CHANGE UP
GAS PNL BLDV: 137 MMOL/L — SIGNIFICANT CHANGE UP (ref 135–145)
GAS PNL BLDV: SIGNIFICANT CHANGE UP
GLUCOSE BLDV-MCNC: 169 MG/DL — HIGH (ref 70–99)
GLUCOSE SERPL-MCNC: 165 MG/DL — HIGH (ref 70–99)
HCO3 BLDV-SCNC: 21 MMOL/L — SIGNIFICANT CHANGE UP (ref 21–29)
HCT VFR BLD CALC: 31.6 % — LOW (ref 34.5–45)
HCT VFR BLDA CALC: 32 % — LOW (ref 39–50)
HGB BLD CALC-MCNC: 10.2 G/DL — LOW (ref 11.5–15.5)
HGB BLD-MCNC: 10 G/DL — LOW (ref 11.5–15.5)
HOROWITZ INDEX BLDV+IHG-RTO: 30 — SIGNIFICANT CHANGE UP
INR BLD: 1.25 RATIO — HIGH (ref 0.88–1.16)
LACTATE BLDV-MCNC: 1.7 MMOL/L — SIGNIFICANT CHANGE UP (ref 0.7–2)
MAGNESIUM SERPL-MCNC: 2.2 MG/DL — SIGNIFICANT CHANGE UP (ref 1.6–2.6)
MCHC RBC-ENTMCNC: 28.1 PG — SIGNIFICANT CHANGE UP (ref 27–34)
MCHC RBC-ENTMCNC: 31.6 GM/DL — LOW (ref 32–36)
MCV RBC AUTO: 88.8 FL — SIGNIFICANT CHANGE UP (ref 80–100)
METHOD TYPE: SIGNIFICANT CHANGE UP
NRBC # BLD: 0 /100 WBCS — SIGNIFICANT CHANGE UP (ref 0–0)
ORGANISM # SPEC MICROSCOPIC CNT: SIGNIFICANT CHANGE UP
ORGANISM # SPEC MICROSCOPIC CNT: SIGNIFICANT CHANGE UP
OTHER CELLS CSF MANUAL: 11 ML/DL — LOW (ref 18–22)
PCO2 BLDV: 34 MMHG — LOW (ref 35–50)
PH BLDV: 7.41 — SIGNIFICANT CHANGE UP (ref 7.35–7.45)
PHOSPHATE SERPL-MCNC: 2.2 MG/DL — LOW (ref 2.5–4.5)
PLATELET # BLD AUTO: 380 K/UL — SIGNIFICANT CHANGE UP (ref 150–400)
PO2 BLDV: 44 MMHG — SIGNIFICANT CHANGE UP (ref 25–45)
POTASSIUM BLDV-SCNC: 3.6 MMOL/L — SIGNIFICANT CHANGE UP (ref 3.5–5.3)
POTASSIUM SERPL-MCNC: 3.7 MMOL/L — SIGNIFICANT CHANGE UP (ref 3.5–5.3)
POTASSIUM SERPL-SCNC: 3.7 MMOL/L — SIGNIFICANT CHANGE UP (ref 3.5–5.3)
PROTHROM AB SERPL-ACNC: 14.8 SEC — HIGH (ref 10.6–13.6)
RBC # BLD: 3.56 M/UL — LOW (ref 3.8–5.2)
RBC # FLD: 14.7 % — HIGH (ref 10.3–14.5)
SAO2 % BLDV: 76 % — SIGNIFICANT CHANGE UP (ref 67–88)
SODIUM SERPL-SCNC: 138 MMOL/L — SIGNIFICANT CHANGE UP (ref 135–145)
SPECIMEN SOURCE: SIGNIFICANT CHANGE UP
WBC # BLD: 14.67 K/UL — HIGH (ref 3.8–10.5)
WBC # FLD AUTO: 14.67 K/UL — HIGH (ref 3.8–10.5)

## 2020-11-06 PROCEDURE — 93970 EXTREMITY STUDY: CPT | Mod: 26

## 2020-11-06 PROCEDURE — 99232 SBSQ HOSP IP/OBS MODERATE 35: CPT

## 2020-11-06 PROCEDURE — 71045 X-RAY EXAM CHEST 1 VIEW: CPT | Mod: 26

## 2020-11-06 PROCEDURE — 99233 SBSQ HOSP IP/OBS HIGH 50: CPT

## 2020-11-06 RX ORDER — CEFTRIAXONE 500 MG/1
1000 INJECTION, POWDER, FOR SOLUTION INTRAMUSCULAR; INTRAVENOUS EVERY 24 HOURS
Refills: 0 | Status: COMPLETED | OUTPATIENT
Start: 2020-11-07 | End: 2020-11-08

## 2020-11-06 RX ORDER — CEFTRIAXONE 500 MG/1
INJECTION, POWDER, FOR SOLUTION INTRAMUSCULAR; INTRAVENOUS
Refills: 0 | Status: COMPLETED | OUTPATIENT
Start: 2020-11-06 | End: 2020-11-08

## 2020-11-06 RX ORDER — POTASSIUM CHLORIDE 20 MEQ
20 PACKET (EA) ORAL ONCE
Refills: 0 | Status: COMPLETED | OUTPATIENT
Start: 2020-11-06 | End: 2020-11-06

## 2020-11-06 RX ORDER — CALCIUM GLUCONATE 100 MG/ML
1 VIAL (ML) INTRAVENOUS ONCE
Refills: 0 | Status: COMPLETED | OUTPATIENT
Start: 2020-11-06 | End: 2020-11-06

## 2020-11-06 RX ORDER — CHLORHEXIDINE GLUCONATE 213 G/1000ML
15 SOLUTION TOPICAL EVERY 12 HOURS
Refills: 0 | Status: DISCONTINUED | OUTPATIENT
Start: 2020-11-06 | End: 2020-11-18

## 2020-11-06 RX ORDER — POTASSIUM PHOSPHATE, MONOBASIC POTASSIUM PHOSPHATE, DIBASIC 236; 224 MG/ML; MG/ML
30 INJECTION, SOLUTION INTRAVENOUS ONCE
Refills: 0 | Status: COMPLETED | OUTPATIENT
Start: 2020-11-06 | End: 2020-11-06

## 2020-11-06 RX ORDER — CHLORHEXIDINE GLUCONATE 213 G/1000ML
1 SOLUTION TOPICAL
Refills: 0 | Status: DISCONTINUED | OUTPATIENT
Start: 2020-11-06 | End: 2020-11-19

## 2020-11-06 RX ORDER — LABETALOL HCL 100 MG
10 TABLET ORAL ONCE
Refills: 0 | Status: DISCONTINUED | OUTPATIENT
Start: 2020-11-06 | End: 2020-11-07

## 2020-11-06 RX ORDER — CEFTRIAXONE 500 MG/1
1000 INJECTION, POWDER, FOR SOLUTION INTRAMUSCULAR; INTRAVENOUS ONCE
Refills: 0 | Status: COMPLETED | OUTPATIENT
Start: 2020-11-06 | End: 2020-11-06

## 2020-11-06 RX ORDER — ACETAMINOPHEN 500 MG
650 TABLET ORAL EVERY 6 HOURS
Refills: 0 | Status: DISCONTINUED | OUTPATIENT
Start: 2020-11-06 | End: 2020-11-10

## 2020-11-06 RX ORDER — NYSTATIN CREAM 100000 [USP'U]/G
1 CREAM TOPICAL
Refills: 0 | Status: DISCONTINUED | OUTPATIENT
Start: 2020-11-06 | End: 2020-11-26

## 2020-11-06 RX ORDER — ENOXAPARIN SODIUM 100 MG/ML
40 INJECTION SUBCUTANEOUS DAILY
Refills: 0 | Status: DISCONTINUED | OUTPATIENT
Start: 2020-11-06 | End: 2020-11-18

## 2020-11-06 RX ADMIN — POTASSIUM PHOSPHATE, MONOBASIC POTASSIUM PHOSPHATE, DIBASIC 83.33 MILLIMOLE(S): 236; 224 INJECTION, SOLUTION INTRAVENOUS at 04:24

## 2020-11-06 RX ADMIN — Medication 650 MILLIGRAM(S): at 13:15

## 2020-11-06 RX ADMIN — Medication 10 MILLIGRAM(S): at 10:43

## 2020-11-06 RX ADMIN — Medication 650 MILLIGRAM(S): at 18:13

## 2020-11-06 RX ADMIN — ATORVASTATIN CALCIUM 80 MILLIGRAM(S): 80 TABLET, FILM COATED ORAL at 21:32

## 2020-11-06 RX ADMIN — CHLORHEXIDINE GLUCONATE 1 APPLICATION(S): 213 SOLUTION TOPICAL at 05:14

## 2020-11-06 RX ADMIN — Medication 25 MILLIGRAM(S): at 17:27

## 2020-11-06 RX ADMIN — FENTANYL CITRATE 25 MICROGRAM(S): 50 INJECTION INTRAVENOUS at 02:25

## 2020-11-06 RX ADMIN — LIDOCAINE 1 PATCH: 4 CREAM TOPICAL at 07:15

## 2020-11-06 RX ADMIN — PANTOPRAZOLE SODIUM 40 MILLIGRAM(S): 20 TABLET, DELAYED RELEASE ORAL at 12:14

## 2020-11-06 RX ADMIN — FENTANYL CITRATE 25 MICROGRAM(S): 50 INJECTION INTRAVENOUS at 21:08

## 2020-11-06 RX ADMIN — Medication 25 MILLIGRAM(S): at 05:14

## 2020-11-06 RX ADMIN — CHLORHEXIDINE GLUCONATE 15 MILLILITER(S): 213 SOLUTION TOPICAL at 05:14

## 2020-11-06 RX ADMIN — Medication 10 MILLIGRAM(S): at 06:04

## 2020-11-06 RX ADMIN — LEVETIRACETAM 400 MILLIGRAM(S): 250 TABLET, FILM COATED ORAL at 17:28

## 2020-11-06 RX ADMIN — LEVETIRACETAM 400 MILLIGRAM(S): 250 TABLET, FILM COATED ORAL at 05:13

## 2020-11-06 RX ADMIN — FENTANYL CITRATE 25 MICROGRAM(S): 50 INJECTION INTRAVENOUS at 21:25

## 2020-11-06 RX ADMIN — FENTANYL CITRATE 25 MICROGRAM(S): 50 INJECTION INTRAVENOUS at 02:08

## 2020-11-06 RX ADMIN — Medication 650 MILLIGRAM(S): at 12:15

## 2020-11-06 RX ADMIN — Medication 10 MILLIGRAM(S): at 15:07

## 2020-11-06 RX ADMIN — LIDOCAINE 1 PATCH: 4 CREAM TOPICAL at 19:02

## 2020-11-06 RX ADMIN — FENTANYL CITRATE 25 MICROGRAM(S): 50 INJECTION INTRAVENOUS at 10:53

## 2020-11-06 RX ADMIN — Medication 10 MILLIGRAM(S): at 20:33

## 2020-11-06 RX ADMIN — Medication 20 MILLIEQUIVALENT(S): at 03:59

## 2020-11-06 RX ADMIN — Medication 650 MILLIGRAM(S): at 17:27

## 2020-11-06 RX ADMIN — Medication 100 GRAM(S): at 13:11

## 2020-11-06 RX ADMIN — CEFEPIME 100 MILLIGRAM(S): 1 INJECTION, POWDER, FOR SOLUTION INTRAMUSCULAR; INTRAVENOUS at 05:13

## 2020-11-06 RX ADMIN — Medication 10 MILLIGRAM(S): at 00:02

## 2020-11-06 RX ADMIN — NYSTATIN CREAM 1 APPLICATION(S): 100000 CREAM TOPICAL at 05:13

## 2020-11-06 RX ADMIN — Medication 650 MILLIGRAM(S): at 23:03

## 2020-11-06 RX ADMIN — CHLORHEXIDINE GLUCONATE 15 MILLILITER(S): 213 SOLUTION TOPICAL at 17:27

## 2020-11-06 RX ADMIN — CEFTRIAXONE 100 MILLIGRAM(S): 500 INJECTION, POWDER, FOR SOLUTION INTRAMUSCULAR; INTRAVENOUS at 10:01

## 2020-11-06 RX ADMIN — NYSTATIN CREAM 1 APPLICATION(S): 100000 CREAM TOPICAL at 17:27

## 2020-11-06 RX ADMIN — FENTANYL CITRATE 25 MICROGRAM(S): 50 INJECTION INTRAVENOUS at 10:38

## 2020-11-06 NOTE — PROGRESS NOTE ADULT - ATTENDING COMMENTS
seen and examined 11-06-20 @ 1224    intubated on mechanical vent (FiO2 = 30%, PEEP = +5)  on Jevity 1.2 @ 65 mL/hr  off sedation  c-collar in place  PERRL  opens eyes and withdrawals to pain  5 mm left forehead wound without evidence of infection    left holohemispheric / parafalcine / tentorial SDH  right  shunt ligated by neurosurgery  -stable on 11/4 1305 repeat CT head  -serial neuro exams  -start chemical VTE prophylaxis today  -very poor prognosis, so f/u palliative care consult

## 2020-11-06 NOTE — PROGRESS NOTE ADULT - SUBJECTIVE AND OBJECTIVE BOX
Neurology Progress Note    Interval History - No events overnight    Subjective:  Patient seen and examined at bedside. Unable to provide history due to intubation/mental status.    Objective:   Vital Signs Last 24 Hrs  T(C): 37.7 (06 Nov 2020 11:00), Max: 37.7 (06 Nov 2020 03:00)  T(F): 99.9 (06 Nov 2020 11:00), Max: 99.9 (06 Nov 2020 03:00)  HR: 79 (06 Nov 2020 11:00) (71 - 97)  BP: 184/82 (06 Nov 2020 11:00) (153/69 - 211/100)  BP(mean): 118 (06 Nov 2020 11:00) (99 - 144)  RR: 22 (06 Nov 2020 11:00) (14 - 34)  SpO2: 100% (06 Nov 2020 11:00) (98% - 100%)    General Exam:   Constitutional: Appears stated age, intubated.   Head: Noted forehead abrasion on the L side of the forehead.    Neurological (>12):  MS: Intubated, not following commands, no response to vocal stimulation. Grimaces to sternal rub.     Language: Intubated.     CNs: (R = 2mm, L = 2mm) Poorly reactive. No noted blink to threat. Corneal reflex intact. Gag reflex intact. No facial asymmetry b/l.     Motor: Noted repetitive thumb to pointer tremor bilaterally at rest. Increased tone in the LLE in extension.   Withdrawal to noxious stimuli in all 4 extremities LLE>RLE. Reduced withdrawal in the LLE.     Sensation: Withdrawal to noxious stimuli in all 4 extremities LLE>RLE    Biceps(C5)       BR(C6)     Triceps(C7)               Patellar(L4)    Achilles(S1)    Plantar Resp  R	3	          3	             3		        3		    2		Up   L	3	          3	             3	   unable to test due to tone	Up    Coordination: unable to test  Gait: Deferred     11-06    138  |  106  |  12  ----------------------------<  165<H>  3.7   |  20<L>  |  0.54    Ca    8.4      06 Nov 2020 01:41  Phos  2.2     11-06  Mg     2.2     11-06 11-06    138  |  106  |  12  ----------------------------<  165<H>  3.7   |  20<L>  |  0.54    Ca    8.4      06 Nov 2020 01:41  Phos  2.2     11-06  Mg     2.2     11-06                          10.0   14.67 )-----------( 380      ( 06 Nov 2020 01:41 )             31.6     Radiology  CT Head No Cont (11.04.20 @ 13:13)  IMPRESSION:  New foci of low density in the right inferior cerebellar hemisphere, suspicious for acute infarction. No CT evidence for hemorrhagic transformation.    Decreased size of left lateral convexity subdural hemorrhage. Similar size of right lateral convexity subdural hemorrhage.    Decreased size of interhemispheric subdural hemorrhage. Similar tentorial subdural hemorrhage.    Decreased rightward midline shift, currently 7 mm, previously 11 mm.    Increased ventricular size, no large hydrocephalus. Basal cisterns are more well visualized on the current examination.    < end of copied text >    MEDICATIONS  (STANDING):  acetaminophen   Tablet .. 650 milliGRAM(s) Oral every 6 hours  atorvastatin 80 milliGRAM(s) Oral at bedtime  cefTRIAXone   IVPB      chlorhexidine 0.12% Liquid 15 milliLiter(s) Oral Mucosa every 12 hours  chlorhexidine 2% Cloths 1 Application(s) Topical <User Schedule>  levETIRAcetam  IVPB 500 milliGRAM(s) IV Intermittent every 12 hours  lidocaine   Patch 1 Patch Transdermal every 24 hours  metoprolol tartrate 25 milliGRAM(s) Oral every 12 hours  nystatin Powder 1 Application(s) Topical two times a day  pantoprazole  Injectable 40 milliGRAM(s) IV Push daily    MEDICATIONS  (PRN):  fentaNYL    Injectable 25 MICROGram(s) IV Push every 2 hours PRN pain  labetalol Injectable 10 milliGRAM(s) IV Push every 4 hours PRN SBP > 180            Neurology Progress Note    Interval History - No events overnight    Subjective:  Patient seen and examined at bedside. Unable to provide history due to intubation/mental status.    Objective:   Vital Signs Last 24 Hrs  T(C): 37.7 (06 Nov 2020 11:00), Max: 37.7 (06 Nov 2020 03:00)  T(F): 99.9 (06 Nov 2020 11:00), Max: 99.9 (06 Nov 2020 03:00)  HR: 79 (06 Nov 2020 11:00) (71 - 97)  BP: 184/82 (06 Nov 2020 11:00) (153/69 - 211/100)  BP(mean): 118 (06 Nov 2020 11:00) (99 - 144)  RR: 22 (06 Nov 2020 11:00) (14 - 34)  SpO2: 100% (06 Nov 2020 11:00) (98% - 100%)    General Exam:   Constitutional: Appears stated age, intubated.   Head: Noted forehead abrasion on the L side of the forehead.    Neurological (>12):  MS: Intubated, not following commands, no response to vocal stimulation. Grimaces to sternal rub.     Language: Intubated.     CNs: (R = 2mm, L = 2mm) Poorly reactive. No noted blink to threat. Corneal reflex intact. Gag reflex intact. No facial asymmetry b/l.     Motor: Noted repetitive thumb to pointer tremor bilaterally at rest. Increased tone in the LLE in extension.   Withdrawal to noxious stimuli in all 4 extremities LLE>RLE. Reduced withdrawal in the LLE.     Sensation: Withdrawal to noxious stimuli in all 4 extremities LLE>RLE    Biceps(C5)       BR(C6)     Triceps(C7)               Patellar(L4)    Achilles(S1)    Plantar Resp  R	3	          3	             3		        3		    2		Up   L	3	          3	             3	   unable to test due to tone	Up    Coordination: unable to test  Gait: Deferred     11-06    138  |  106  |  12  ----------------------------<  165<H>  3.7   |  20<L>  |  0.54    Ca    8.4      06 Nov 2020 01:41  Phos  2.2     11-06  Mg     2.2     11-06 11-06    138  |  106  |  12  ----------------------------<  165<H>  3.7   |  20<L>  |  0.54    Ca    8.4      06 Nov 2020 01:41  Phos  2.2     11-06  Mg     2.2     11-06                          10.0   14.67 )-----------( 380      ( 06 Nov 2020 01:41 )             31.6         MEDICATIONS  (STANDING):  acetaminophen   Tablet .. 650 milliGRAM(s) Oral every 6 hours  atorvastatin 80 milliGRAM(s) Oral at bedtime  cefTRIAXone   IVPB      chlorhexidine 0.12% Liquid 15 milliLiter(s) Oral Mucosa every 12 hours  chlorhexidine 2% Cloths 1 Application(s) Topical <User Schedule>  levETIRAcetam  IVPB 500 milliGRAM(s) IV Intermittent every 12 hours  lidocaine   Patch 1 Patch Transdermal every 24 hours  metoprolol tartrate 25 milliGRAM(s) Oral every 12 hours  nystatin Powder 1 Application(s) Topical two times a day  pantoprazole  Injectable 40 milliGRAM(s) IV Push daily    MEDICATIONS  (PRN):  fentaNYL    Injectable 25 MICROGram(s) IV Push every 2 hours PRN pain  labetalol Injectable 10 milliGRAM(s) IV Push every 4 hours PRN SBP > 180        < from: CT Head No Cont (11.04.20 @ 13:13) >  FINDINGS:    Redemonstration of right parietal approach ventriculostomy catheter in unchanged position.    Acute left lateral convexity subdural hemorrhage measures 1.7 cm in greatest depth, decreased from 1.9 cm.    Acute right lateral convexity subdural hemorrhage measures 6 mm in greatest depth, similar to the prior examination.    Acute subdural hemorrhage in the interhemispheric fissure measures 9 mm in greatest thickness, previously measuring 11 mm    Acute subdural hemorrhage along the tentorial leaves is similar, measuring4 to 5 mm in greatest thickness on the left.    Rightward midline shift of 7 mm is decreased from 11 mm. Increased ventricular size, no large hydrocephalus. Basal cisterns are more well visualized on the current examination.    Similar nonspecific 6 mm focus of increased attenuation in the left corona radiata (2-22).    New foci of low density in the right inferior cerebellar hemisphere, suspicious for acute infarction. No CT evidence for hemorrhagic transformation.    Similar extensive white matter microvascular ischemic disease.    No displaced calvarial fracture. The visualized paranasal sinuses and mastoid air cells are clear.    IMPRESSION:  New foci of low density in the right inferior cerebellar hemisphere, suspicious for acute infarction. No CT evidence for hemorrhagic transformation.    Decreased size of left lateral convexity subdural hemorrhage. Similar size of right lateral convexity subdural hemorrhage.    Decreased size of interhemispheric subdural hemorrhage. Similar tentorial subdural hemorrhage.    Decreased rightward midline shift, currently 7 mm, previously 11 mm.    Increased ventricular size, no large hydrocephalus. Basal cisterns are more well visualized on the current examination.    < end of copied text >      < from: CT Head No Cont (11.03.20 @ 23:29) >  Bilateral supratentorial subdural hematomas are again identified. The subdural hematoma on the left side measures approximately 2.2 cm in widest diameter and on the right side measures approximately 0.5 cm in widest diameter. Acute subdural hematoma along the interhemispheric region is seen along the left side and measures approximately 0.9 cm widest diameter. Acute subdural hematomas along bilateral tentorial region are again seen as well. There is mass effect on left lateral ventricle. Left-to-right shift (1.1 cm) is again seen.    Right parietal shunt catheter is again seen. This catheter appears unchanged in position.    Evaluation of the osseous structures with the appropriate window appears normal    The visualized paranasal sinuses mastoid and middle ear regions appear clear.    IMPRESSION: No stiffing change when allowing for differences in technique.    < end of copied text >

## 2020-11-06 NOTE — PROGRESS NOTE ADULT - ATTENDING COMMENTS
S/p fall at home with largr lt SDH with shift, small lt SDH  F/U CT stable lt SDH with decreasing midline shift, with new rt cerebellar infarct    Neuro exam remains poor, non communicative does not follow, h/o dementia and hydrocephalus  Continue Keppra for seizure prophylaxis  BP goal -180 , on Lopressor  Vent adjustment for resp alkalosis  Tube feed at goal  Son made aware of incidental finding of lung and renal mass  Chemical DVT ppx on hold as per neuro surgery recommendation, surveillance duplex of LE  Pan sensitive E coli UTI, change Cefepime to CTX  Monitor glycemia.  MSK L 8-10 rib fractures. Recent L hip fx, non operative management  Son kept updated, pt is DNR

## 2020-11-06 NOTE — PROGRESS NOTE ADULT - ASSESSMENT
81 y/o  woman with dementia, hydrocephalus s/p VPS admitted after fall at nursing facility with LOC and unresponsiveness.  Initial CTH with acute SDH L>R and on interhemispheric ffissure and tentorial leaves with rightward shift 11mm decreased to 7mm. Repeat CTH with new R inferior cerebellar hypodensity concerning for infarct. Neuro exam without appreciable interval change: intubated on UE restraints, no verbal output, not following commands, movement of extremities L>R w/ noxious stimuli.    Recommendations:  [x] Hold antiplatelets given SDH; consider when cleared from nsx standpoint.  [x] Lipitor 80mg can be reduced to 40mg  [x] c/w Keppra 500mg BID x 7 days.   [x] LDL 84, HbA1C 5.6  [x] Telemetry monitoring  [x] SBP goal normotensive, 110-160  [ ] rEEG, TTE, MRI/MRA when able  [ ] LE dopplers  [ ] GOC discussion pending, likely needs trach/PEG    Dispo: 8ICU  Diet: NPO with OGT feeds  Prognosis: Guarded    Patient seen by Dr. Mcmanus, Stroke Attending.     Neuro to sign off for now, please call at [phone] or [phone] (on weekend) if/when imaging has been completed.    Natalie Triana, DO  PGY-2  Neurology Resident 79 y/o  woman with dementia, hydrocephalus s/p VPS admitted after fall at nursing facility with LOC and unresponsiveness.  Initial CTH with acute SDH L>R and on interhemispheric ffissure and tentorial leaves with rightward shift 11mm decreased to 7mm. Repeat CTH with new R inferior cerebellar hypodensity concerning for infarct. Neuro exam without appreciable interval change: intubated on UE restraints, no verbal output, not following commands, movement of extremities L>R w/ noxious stimuli.    Recommendations:  [x] Hold antiplatelets given SDH; consider when cleared from nsx standpoint.  [x] Lipitor 80mg can be reduced to 40mg  [x] c/w Keppra 500mg BID x 7 days.   [x] LDL 84, HbA1C 5.6  [x] Telemetry monitoring  [x] SBP goal normotensive, 110-160  [ ] rEEG, TTE, MRI/MRA when able if in agreement with GOC of family  [ ] LE dopplers  [ ] GOC discussion pending, likely needs trach/PEG    Dispo: 8ICU  Diet: NPO with OGT feeds  Prognosis: Guarded    Patient seen by Dr. Mcmanus, Stroke Attending.     Neuro to sign off for now, please call at [phone] or [phone] (on weekend) if/when imaging has been completed.    Natalie Triana, DO  PGY-2  Neurology Resident

## 2020-11-06 NOTE — CHART NOTE - NSCHARTNOTEFT_GEN_A_CORE
Spoke to Neurosurgery team [id number]  Per NSGY, no chemical VTE prophylaxis at this time  NSGY said they will document this in their note    Will obtain BLE duplex as screening    Marycruz Aguilar PGY2  General Surgery  SICU

## 2020-11-06 NOTE — PROGRESS NOTE ADULT - ASSESSMENT
80F hx of dementia, hydrocephalus s/p  shunt and recent hip fracture non-operative management at SNF presents from SNF after a mechanical fall, level I trauma activated, primary survey significant for GCS 8, decision was made to intubate in trauma bay. Secondary survey significant for L forehead hematoma, R lower abdomen abrasion. CT scan significant for large L SDH with midline shift and L 8-10 rib fx. NSGY and family discussed, plan for non-operative management at this time. Patient was transferred to SICU for vent management and hemodynamic monitoring.    Neuro:  - Repeat CT head with new cerebellar fracture + stable/less subdural bleeds  - Keppra 500 BID  - Off all sedation  - Fentanyl pushes for pain Q2 + Ativan for anxiety       Resp: L 8-10 rib fx  - Intubated, 12/370/30/5      CV: Goal SBP <200  - Off cardene gtt    - Metoprolol 25mg Q12, Labetalol Q4 PRN    GI:  - KO placed, tube feeds started  - Liver mass (+ renal mass) finding discussed with son     :   - Carrasco for hemodynamic monitoring     Heme:  - Hold VTE ppx      ID: UTI  - Cefepime for UTI     Endo:  - Monitor glucose on BMP    MSK: L hip fx  - No hip fractures of xray     Dispo:  - SICU  - DNR

## 2020-11-06 NOTE — PROGRESS NOTE ADULT - SUBJECTIVE AND OBJECTIVE BOX
HISTORY  80F hx of dementia, hydrocephalus s/p  shunt and recent hip fracture non-operative management at SNF presents from SNF after a mechanical fall, level I trauma activated, primary survey significant for GCS 8, decision was made to intubate in trauma bay. Secondary survey significant for L forehead hematoma, R lower abdomen abrasion. CT scan significant for large L SDH with midline shift and L 8-10 rib fx. NSGY and family discussed, plan for non-operative management at this time. Patient was transferred to SICU for vent management and hemodynamic monitoring.    NSGY ligated  shunt at bedside. Repeat CT head was stable. Patient was briefly started on a cardene gtt for hypertensive urgency, goal SBP < 180. UA+ started on meropenem. Plan per NSGY to continue conservative management at this time.      24 HOUR EVENTS:  -KO placed, tube feeds started  -Statin Started  -TV decreased  -Metoprolol 25Q12 started, Labetalol Q4PRN for HTN    SUBJECTIVE/ROS:  [ ] A ten-point review of systems was otherwise negative except as noted.  [X ] Due to altered mental status/intubation, subjective information were not able to be obtained from the patient. History was obtained, to the extent possible, from review of the chart and collateral sources of information.      NEURO  Exam: withdraws from pain, pupils equal  Meds: fentaNYL    Injectable 25 MICROGram(s) IV Push every 2 hours PRN pain  levETIRAcetam  IVPB 500 milliGRAM(s) IV Intermittent every 12 hours    [x] Adequacy of sedation and pain control has been assessed and adjusted      RESPIRATORY  RR: 22 (11-06-20 @ 03:00) (14 - 34)  SpO2: 100% (11-06-20 @ 03:00) (98% - 100%)  Wt(kg): --  Exam:   Mechanical Ventilation: Mode: AC/ CMV (Assist Control/ Continuous Mandatory Ventilation), RR (machine): 12, RR (patient): 24, TV (machine): 370, FiO2: 30, PEEP: 5, ITime: 0.9, MAP: 6, PIP: 9  ABG - ( 05 Nov 2020 13:34 )  pH: 7.44  /  pCO2: 27    /  pO2: 153   / HCO3: 18    / Base Excess: -4.2  /  SaO2: 99      Lactate: x                [ ] Extubation Readiness Assessed  Meds:       CARDIOVASCULAR  HR: 71 (11-06-20 @ 03:00) (71 - 97)  BP: 153/69 (11-06-20 @ 03:00) (153/69 - 211/100)  BP(mean): 99 (11-06-20 @ 03:00) (99 - 144)  ABP: --  ABP(mean): --  Wt(kg): --  CVP(cm H2O): --  VBG - ( 06 Nov 2020 01:37 )  pH: 7.41  /  pCO2: 34    /  pO2: 44    / HCO3: 21    / Base Excess: -2.3  /  SaO2: 76     Lactate: 1.7                Exam:  Cardiac Rhythm: NSR  Perfusion     [X ]Adequate   [ ]Inadequate  Mentation   [ X]Normal       [ ]Reduced  Extremities  [X ]Warm         [ ]Cool  Volume Status [ ]Hypervolemic [ ]Euvolemic [ ]Hypovolemic  Meds: labetalol Injectable 10 milliGRAM(s) IV Push every 4 hours PRN SBP > 180  metoprolol tartrate 25 milliGRAM(s) Oral every 12 hours        GI/NUTRITION  Exam:  Diet:  Meds: pantoprazole  Injectable 40 milliGRAM(s) IV Push daily      GENITOURINARY  I&O's Detail    11-04 @ 07:01  -  11-05 @ 07:00  --------------------------------------------------------  IN:    Dexmedetomidine: 3.8 mL    IV PiggyBack: 550 mL    IV PiggyBack: 112.5 mL    IV PiggyBack: 200 mL    sodium chloride 0.9%: 1800 mL  Total IN: 2666.3 mL    OUT:    Ureteral Catheter (mL): 855 mL  Total OUT: 855 mL    Total NET: 1811.3 mL      11-05 @ 07:01  -  11-06 @ 03:42  --------------------------------------------------------  IN:    Enteral Tube Flush: 80 mL    IV PiggyBack: 150 mL    Jevity 1.2: 320 mL    sodium chloride 0.9%: 75 mL    sodium chloride 0.9%: 300 mL  Total IN: 925 mL    OUT:    Ureteral Catheter (mL): 960 mL  Total OUT: 960 mL    Total NET: -35 mL          11-06    138  |  106  |  12  ----------------------------<  165<H>  3.7   |  20<L>  |  0.54    Ca    8.4      06 Nov 2020 01:41  Phos  2.2     11-06  Mg     2.2     11-06      [ ] Carrasco catheter, indication:   Meds: potassium chloride   Solution 20 milliEquivalent(s) Enteral Tube once  potassium phosphate IVPB 30 milliMole(s) IV Intermittent once        HEMATOLOGIC  Meds:   [x] VTE Prophylaxis                        10.0   14.67 )-----------( 380      ( 06 Nov 2020 01:41 )             31.6     PT/INR - ( 06 Nov 2020 01:41 )   PT: 14.8 sec;   INR: 1.25 ratio         PTT - ( 06 Nov 2020 01:41 )  PTT:29.1 sec  Transfusion     [ ] PRBC   [ ] Platelets   [ ] FFP   [ ] Cryoprecipitate      INFECTIOUS DISEASES  T(C): 37.7 (11-06-20 @ 03:00), Max: 37.7 (11-06-20 @ 03:00)  Wt(kg): --  WBC Count: 14.67 K/uL (11-06 @ 01:41)  WBC Count: 14.34 K/uL (11-05 @ 05:26)    Recent Cultures:  Specimen Source: .Urine Catheterized, 11-04 @ 05:52; Results   10,000 - 49,000 CFU/mL Escherichia coli; Gram Stain: --; Organism: --  Specimen Source: .Blood Blood-Arterial, 11-04 @ 05:22; Results   No growth to date.; Gram Stain: --; Organism: --    Meds: cefepime   IVPB      cefepime   IVPB 1000 milliGRAM(s) IV Intermittent every 12 hours        ENDOCRINE  Capillary Blood Glucose    Meds: atorvastatin 80 milliGRAM(s) Oral at bedtime        ACCESS DEVICES:  [x] Peripheral IV  [ ] Central Venous Line	[ ] R	[ ] L	[ ] IJ	[ ] Fem	[ ] SC	Placed:   [ ] Arterial Line		[ ] R	[ ] L	[ ] Fem	[ ] Rad	[ ] Ax	Placed:   [ ] PICC:					[ ] Mediport  [ ] Urinary Catheter, Date Placed:   [x] Necessity of urinary, arterial, and venous catheters discussed    OTHER MEDICATIONS:  chlorhexidine 0.12% Liquid 15 milliLiter(s) Oral Mucosa every 12 hours  chlorhexidine 2% Cloths 1 Application(s) Topical <User Schedule>  lidocaine   Patch 1 Patch Transdermal every 24 hours  nystatin Powder 1 Application(s) Topical two times a day      CODE STATUS: Yes        IMAGING:

## 2020-11-06 NOTE — PROGRESS NOTE ADULT - ASSESSMENT
80F PMHx NPH s/p Shunt approx 10 years ago, recent hip Fx, found down at rehab today, CT head in ED shows acute left, interhemispheric, small R SDH.  shunt ligated at clavicle bedside in SICU, repeat CT after ligation shows stable SDH  - further mgmt plans pending GOC and montioring of neuro status.   - LED neg 11/6  - ok for dvt ppx   - Neurology saw, recc'd stroke w/u CTA vs MRA, MRI brain when able depending on family wishes/ GOC  - WBC 14 stable, INR 1.25 (goal <1.3)  - shunt ligated at clavicle 11/3, site c/d/i  - Made DNR  - now off all sedation  - 12 hr repeat HCT 11/4 dec heme, new small R cerebellar infarct (embolic vs hypercoag)  - keppra 500 BID  - ADM trauma for L rib fx  - CT also c/f metastatic RCC  - Cefepime for UTI

## 2020-11-06 NOTE — PROGRESS NOTE ADULT - SUBJECTIVE AND OBJECTIVE BOX
Patient seen and examined at bedside.    --Anticoagulation--  enoxaparin Injectable 40 milliGRAM(s) SubCutaneous daily    T(C): 37.3 (11-06-20 @ 15:00), Max: 37.7 (11-06-20 @ 03:00)  HR: 86 (11-06-20 @ 16:12) (70 - 92)  BP: 177/79 (11-06-20 @ 16:00) (153/69 - 211/100)  RR: 50 (11-06-20 @ 16:00) (14 - 50)  SpO2: 99% (11-06-20 @ 16:12) (98% - 100%)  Wt(kg): --    Exam:  intubated, pupils 3R, LUE localizes, RUE w/d, RLE TF vs w/d, LLE w/d    No new imaging    Labs:                         10.0   14.67 )-----------( 380      ( 06 Nov 2020 01:41 )             31.6     11-06    138  |  106  |  12  ----------------------------<  165<H>  3.7   |  20<L>  |  0.54    Ca    8.4      06 Nov 2020 01:41  Phos  2.2     11-06  Mg     2.2     11-06    PT/INR - ( 06 Nov 2020 01:41 )   PT: 14.8 sec;   INR: 1.25 ratio         PTT - ( 06 Nov 2020 01:41 )  PTT:29.1 sec

## 2020-11-06 NOTE — CHART NOTE - NSCHARTNOTEFT_GEN_A_CORE
Per NSGY team [id number], ok for chemical VTE ppx.    Starting on LVX 40mg qd.    Marycruz Aguilar PGY2  General Surgery  SICU

## 2020-11-06 NOTE — PROGRESS NOTE ADULT - ASSESSMENT
80 year old female, with a medical history significant for dementia, not on A/C, BIBEMS unresponsive with head injury, reportedly fell, intubated in the ED to protect airway (GS=8), imaging studies showed left acute SDH, repeat CT after  shunt ligation shows stable SDH and left to right shift. CT head 4/11 revealed decreased subdural hematoma and midline shift 11mm-> 7 mm. New low density cerebellar focus suspicious for infarcts.       Plan:   - Pain control   - c/w intubation and mechanical ventilation  - Appreciate neurosurgery:  Conservative management.   - NPO   - Tertiary survey in 24 hours   - Follow up with family meeting discussing GOC.  - Appreciate SICU care.    ACS  p. [phone]

## 2020-11-06 NOTE — PROGRESS NOTE ADULT - SUBJECTIVE AND OBJECTIVE BOX
SURGERY DAILY PROGRESS NOTE:     24 HOUR EVENTS:  -KO placed, tube feeds started  -Statin Started  -TV decreased  -Metoprolol 25Q12 started, Labetalol Q4PRN for HTN      SUBJECTIVE/ROS: Patient seen and evaluated on AM rounds. Patient on tube feeds at 65 cc/hour. On mechanical ventilator settings , RR12, PEEP 5, FiO2 30%.          OBJECTIVE:    Vital Signs Last 24 Hrs  T(C): 37.7 (2020 11:00), Max: 37.7 (2020 03:00)  T(F): 99.9 (2020 11:00), Max: 99.9 (2020 03:00)  HR: 82 (2020 10:) (71 - 97)  BP: 188/84 (2020 10:00) (153/69 - 211/100)  BP(mean): 120 (2020 10:) (99 - 144)  RR: 22 (2020 10:) (14 - 34)  SpO2: 100% (2020 10:00) (98% - 100%)  I&O's Detail    2020 07:01  -  2020 07:00  --------------------------------------------------------  IN:    Enteral Tube Flush: 220 mL    IV PiggyBack: 50 mL    IV PiggyBack: 100 mL    IV PiggyBack: 399.9 mL    Jevity 1.2: 565 mL    sodium chloride 0.9%: 75 mL    sodium chloride 0.9%: 300 mL  Total IN: 1709.9 mL    OUT:    Ureteral Catheter (mL): 1110 mL  Total OUT: 1110 mL    Total NET: 599.9 mL      2020 07:01  -  2020 11:07  --------------------------------------------------------  IN:    IV PiggyBack: 249.9 mL    Jevity 1.2: 260 mL  Total IN: 509.9 mL    OUT:    Ureteral Catheter (mL): 200 mL  Total OUT: 200 mL    Total NET: 309.9 mL        Daily     Daily Weight in k.2 (2020 01:44)  MEDICATIONS  (STANDING):  acetaminophen   Tablet .. 650 milliGRAM(s) Oral every 6 hours  atorvastatin 80 milliGRAM(s) Oral at bedtime  cefTRIAXone   IVPB      chlorhexidine 0.12% Liquid 15 milliLiter(s) Oral Mucosa every 12 hours  chlorhexidine 2% Cloths 1 Application(s) Topical <User Schedule>  levETIRAcetam  IVPB 500 milliGRAM(s) IV Intermittent every 12 hours  lidocaine   Patch 1 Patch Transdermal every 24 hours  metoprolol tartrate 25 milliGRAM(s) Oral every 12 hours  nystatin Powder 1 Application(s) Topical two times a day  pantoprazole  Injectable 40 milliGRAM(s) IV Push daily    MEDICATIONS  (PRN):  fentaNYL    Injectable 25 MICROGram(s) IV Push every 2 hours PRN pain  labetalol Injectable 10 milliGRAM(s) IV Push every 4 hours PRN SBP > 180      LABS:                        10.0   14.67 )-----------( 380      ( 2020 01:41 )             31.6     11-06    138  |  106  |  12  ----------------------------<  165<H>  3.7   |  20<L>  |  0.54    Ca    8.4      2020 01:41  Phos  2.2     11-06  Mg     2.2     11-06      PT/INR - ( 2020 01:41 )   PT: 14.8 sec;   INR: 1.25 ratio         PTT - ( 2020 01:41 )  PTT:29.1 sec      Physical Exam  General: intubated, appears to be in NAD  Chest: Intubated on mechanical ventilation.   Neuro:  not following commands, withdrawing to pain in bilateral lower extremities   Abdomen: soft, nontender, nondistended

## 2020-11-07 LAB
ANION GAP SERPL CALC-SCNC: 10 MMOL/L — SIGNIFICANT CHANGE UP (ref 5–17)
APTT BLD: 28 SEC — SIGNIFICANT CHANGE UP (ref 27.5–35.5)
BUN SERPL-MCNC: 13 MG/DL — SIGNIFICANT CHANGE UP (ref 7–23)
CALCIUM SERPL-MCNC: 8.6 MG/DL — SIGNIFICANT CHANGE UP (ref 8.4–10.5)
CHLORIDE SERPL-SCNC: 107 MMOL/L — SIGNIFICANT CHANGE UP (ref 96–108)
CO2 SERPL-SCNC: 20 MMOL/L — LOW (ref 22–31)
CREAT SERPL-MCNC: 0.52 MG/DL — SIGNIFICANT CHANGE UP (ref 0.5–1.3)
GAS PNL BLDA: SIGNIFICANT CHANGE UP
GAS PNL BLDV: SIGNIFICANT CHANGE UP
GLUCOSE SERPL-MCNC: 171 MG/DL — HIGH (ref 70–99)
HCT VFR BLD CALC: 30.8 % — LOW (ref 34.5–45)
HGB BLD-MCNC: 9.5 G/DL — LOW (ref 11.5–15.5)
INR BLD: 1.18 RATIO — HIGH (ref 0.88–1.16)
MAGNESIUM SERPL-MCNC: 2.3 MG/DL — SIGNIFICANT CHANGE UP (ref 1.6–2.6)
MCHC RBC-ENTMCNC: 27.7 PG — SIGNIFICANT CHANGE UP (ref 27–34)
MCHC RBC-ENTMCNC: 30.8 GM/DL — LOW (ref 32–36)
MCV RBC AUTO: 89.8 FL — SIGNIFICANT CHANGE UP (ref 80–100)
NRBC # BLD: 0 /100 WBCS — SIGNIFICANT CHANGE UP (ref 0–0)
PHOSPHATE SERPL-MCNC: 2.3 MG/DL — LOW (ref 2.5–4.5)
PLATELET # BLD AUTO: 405 K/UL — HIGH (ref 150–400)
POTASSIUM SERPL-MCNC: 4.1 MMOL/L — SIGNIFICANT CHANGE UP (ref 3.5–5.3)
POTASSIUM SERPL-SCNC: 4.1 MMOL/L — SIGNIFICANT CHANGE UP (ref 3.5–5.3)
PROTHROM AB SERPL-ACNC: 14 SEC — HIGH (ref 10.6–13.6)
RBC # BLD: 3.43 M/UL — LOW (ref 3.8–5.2)
RBC # FLD: 14.7 % — HIGH (ref 10.3–14.5)
SODIUM SERPL-SCNC: 137 MMOL/L — SIGNIFICANT CHANGE UP (ref 135–145)
WBC # BLD: 12.42 K/UL — HIGH (ref 3.8–10.5)
WBC # FLD AUTO: 12.42 K/UL — HIGH (ref 3.8–10.5)

## 2020-11-07 PROCEDURE — 71045 X-RAY EXAM CHEST 1 VIEW: CPT | Mod: 26

## 2020-11-07 PROCEDURE — 99232 SBSQ HOSP IP/OBS MODERATE 35: CPT

## 2020-11-07 PROCEDURE — 93306 TTE W/DOPPLER COMPLETE: CPT | Mod: 26

## 2020-11-07 RX ORDER — SODIUM CHLORIDE 9 MG/ML
1 INJECTION INTRAMUSCULAR; INTRAVENOUS; SUBCUTANEOUS EVERY 8 HOURS
Refills: 0 | Status: DISCONTINUED | OUTPATIENT
Start: 2020-11-07 | End: 2020-11-13

## 2020-11-07 RX ORDER — SODIUM CHLORIDE 9 MG/ML
500 INJECTION INTRAMUSCULAR; INTRAVENOUS; SUBCUTANEOUS ONCE
Refills: 0 | Status: COMPLETED | OUTPATIENT
Start: 2020-11-07 | End: 2020-11-07

## 2020-11-07 RX ADMIN — LIDOCAINE 1 PATCH: 4 CREAM TOPICAL at 07:30

## 2020-11-07 RX ADMIN — Medication 650 MILLIGRAM(S): at 05:33

## 2020-11-07 RX ADMIN — CEFTRIAXONE 100 MILLIGRAM(S): 500 INJECTION, POWDER, FOR SOLUTION INTRAMUSCULAR; INTRAVENOUS at 09:05

## 2020-11-07 RX ADMIN — SODIUM CHLORIDE 1000 MILLILITER(S): 9 INJECTION INTRAMUSCULAR; INTRAVENOUS; SUBCUTANEOUS at 01:53

## 2020-11-07 RX ADMIN — FENTANYL CITRATE 25 MICROGRAM(S): 50 INJECTION INTRAVENOUS at 02:30

## 2020-11-07 RX ADMIN — FENTANYL CITRATE 25 MICROGRAM(S): 50 INJECTION INTRAVENOUS at 02:15

## 2020-11-07 RX ADMIN — Medication 250 MILLIMOLE(S): at 01:14

## 2020-11-07 RX ADMIN — FENTANYL CITRATE 25 MICROGRAM(S): 50 INJECTION INTRAVENOUS at 15:33

## 2020-11-07 RX ADMIN — FENTANYL CITRATE 25 MICROGRAM(S): 50 INJECTION INTRAVENOUS at 04:41

## 2020-11-07 RX ADMIN — Medication 650 MILLIGRAM(S): at 18:00

## 2020-11-07 RX ADMIN — LEVETIRACETAM 400 MILLIGRAM(S): 250 TABLET, FILM COATED ORAL at 05:35

## 2020-11-07 RX ADMIN — LIDOCAINE 1 PATCH: 4 CREAM TOPICAL at 17:58

## 2020-11-07 RX ADMIN — NYSTATIN CREAM 1 APPLICATION(S): 100000 CREAM TOPICAL at 17:25

## 2020-11-07 RX ADMIN — PANTOPRAZOLE SODIUM 40 MILLIGRAM(S): 20 TABLET, DELAYED RELEASE ORAL at 11:53

## 2020-11-07 RX ADMIN — NYSTATIN CREAM 1 APPLICATION(S): 100000 CREAM TOPICAL at 05:35

## 2020-11-07 RX ADMIN — SODIUM CHLORIDE 1 GRAM(S): 9 INJECTION INTRAMUSCULAR; INTRAVENOUS; SUBCUTANEOUS at 13:16

## 2020-11-07 RX ADMIN — Medication 250 MILLIMOLE(S): at 02:12

## 2020-11-07 RX ADMIN — Medication 25 MILLIGRAM(S): at 05:33

## 2020-11-07 RX ADMIN — Medication 10 MILLIGRAM(S): at 10:42

## 2020-11-07 RX ADMIN — SODIUM CHLORIDE 1 GRAM(S): 9 INJECTION INTRAMUSCULAR; INTRAVENOUS; SUBCUTANEOUS at 21:20

## 2020-11-07 RX ADMIN — Medication 25 MILLIGRAM(S): at 17:25

## 2020-11-07 RX ADMIN — Medication 10 MILLIGRAM(S): at 21:18

## 2020-11-07 RX ADMIN — ENOXAPARIN SODIUM 40 MILLIGRAM(S): 100 INJECTION SUBCUTANEOUS at 11:53

## 2020-11-07 RX ADMIN — Medication 650 MILLIGRAM(S): at 17:25

## 2020-11-07 RX ADMIN — FENTANYL CITRATE 25 MICROGRAM(S): 50 INJECTION INTRAVENOUS at 04:23

## 2020-11-07 RX ADMIN — CHLORHEXIDINE GLUCONATE 15 MILLILITER(S): 213 SOLUTION TOPICAL at 17:25

## 2020-11-07 RX ADMIN — CHLORHEXIDINE GLUCONATE 1 APPLICATION(S): 213 SOLUTION TOPICAL at 05:35

## 2020-11-07 RX ADMIN — FENTANYL CITRATE 25 MICROGRAM(S): 50 INJECTION INTRAVENOUS at 15:48

## 2020-11-07 RX ADMIN — Medication 10 MILLIGRAM(S): at 02:03

## 2020-11-07 RX ADMIN — ATORVASTATIN CALCIUM 80 MILLIGRAM(S): 80 TABLET, FILM COATED ORAL at 21:19

## 2020-11-07 RX ADMIN — Medication 650 MILLIGRAM(S): at 11:53

## 2020-11-07 RX ADMIN — Medication 650 MILLIGRAM(S): at 12:30

## 2020-11-07 RX ADMIN — LEVETIRACETAM 400 MILLIGRAM(S): 250 TABLET, FILM COATED ORAL at 17:25

## 2020-11-07 RX ADMIN — CHLORHEXIDINE GLUCONATE 15 MILLILITER(S): 213 SOLUTION TOPICAL at 05:35

## 2020-11-07 NOTE — PROGRESS NOTE ADULT - SUBJECTIVE AND OBJECTIVE BOX
HISTORY  80F hx of dementia, hydrocephalus s/p  shunt and recent hip fracture non-operative management at SNF presents from SNF after a mechanical fall, level I trauma activated, primary survey significant for GCS 8, decision was made to intubate in trauma bay. Secondary survey significant for L forehead hematoma, R lower abdomen abrasion. CT scan significant for large L SDH with midline shift and L 8-10 rib fx. NSGY and family discussed, plan for non-operative management at this time. Patient was transferred to SICU for vent management and hemodynamic monitoring.    NSGY ligated  shunt at bedside. Repeat CT head was stable. Patient was briefly started on a cardene gtt for hypertensive urgency, goal SBP < 180. UA+ started on meropenem. Plan per NSGY to continue conservative management at this time.  KO tube feeds started on 11/4. Metoprolol 25Q12 started, Labetalol Q4PRN for HTN    24 HOUR EVENTS:  - VTE added for DVT ppx  -LE duplex was negative for DVTs  -Mitt added to left hand      SUBJECTIVE/ROS:  [ ] A ten-point review of systems was otherwise negative except as noted.  [x ] Due to altered mental status/intubation, subjective information were not able to be obtained from the patient. History was obtained, to the extent possible, from review of the chart and collateral sources of information.      NEURO  GCS:6    Exam: obtunded, withdraws from pain  Meds: acetaminophen   Tablet .. 650 milliGRAM(s) Oral every 6 hours  fentaNYL    Injectable 25 MICROGram(s) IV Push every 2 hours PRN pain  levETIRAcetam  IVPB 500 milliGRAM(s) IV Intermittent every 12 hours    [x] Adequacy of sedation and pain control has been assessed and adjusted      RESPIRATORY  RR: 15 (11-07-20 @ 03:00) (12 - 50)  SpO2: 100% (11-07-20 @ 03:00) (96% - 100%)  Mechanical Ventilation: Mode: AC/ CMV (Assist Control/ Continuous Mandatory Ventilation), RR (machine): 12, RR (patient): 32, TV (machine): 400, FiO2: 30, PEEP: 5, ITime: 1, MAP: 7, PIP: 13  ABG - ( 06 Nov 2020 12:26 )  pH: 7.48  /  pCO2: 28    /  pO2: 135   / HCO3: 20    / Base Excess: -2.2  /  SaO2: 99      Lactate: 2.1 -- >1.7              [ ] Extubation Readiness Assessed  Meds:       CARDIOVASCULAR  HR: 72 (11-07-20 @ 03:00) (70 - 100)  BP: 145/67 (11-07-20 @ 03:00) (136/65 - 229/109)  BP(mean): 97 (11-07-20 @ 03:00) (93 - 157)  ABP: --  ABP(mean): --  Wt(kg): --  CVP(cm H2O): --  VBG - ( 07 Nov 2020 00:38 )  pH: 7.41  /  pCO2: 36    /  pO2: 69    / HCO3: 22    / Base Excess: -1.3  /  SaO2: 93     Lactate: 2.1                Cardiac Rhythm: regular  Perfusion     [ x]Adequate   [ ]Inadequate  Mentation   [ ]Normal       [x ]Reduced  Extremities  [ x]Warm         [ ]Cool  Volume Status [ ]Hypervolemic [x ]Euvolemic [ ]Hypovolemic  Meds: labetalol Injectable 10 milliGRAM(s) IV Push every 4 hours PRN SBP > 180  metoprolol tartrate 25 milliGRAM(s) Oral every 12 hours        GI/NUTRITION  Exam: Soft obese   Diet: Tube feeds  Meds: pantoprazole  Injectable 40 milliGRAM(s) IV Push daily      GENITOURINARY  I&O's Detail    11-05 @ 07:01  -  11-06 @ 07:00  --------------------------------------------------------  IN:    Enteral Tube Flush: 220 mL    IV PiggyBack: 100 mL    IV PiggyBack: 50 mL    IV PiggyBack: 399.9 mL    Jevity 1.2: 565 mL    sodium chloride 0.9%: 75 mL    sodium chloride 0.9%: 300 mL  Total IN: 1709.9 mL    OUT:    Ureteral Catheter (mL): 1110 mL  Total OUT: 1110 mL    Total NET: 599.9 mL      11-06 @ 07:01  -  11-07 @ 03:06  --------------------------------------------------------  IN:    Enteral Tube Flush: 130 mL    IV PiggyBack: 499.9 mL    Jevity 1.2: 715 mL    Pivot 1.5: 440 mL  Total IN: 1784.9 mL    OUT:    Indwelling Catheter - Urethral (mL): 745 mL    Ureteral Catheter (mL): 405 mL    Ureteral Catheter (mL): 145 mL  Total OUT: 1295 mL    Total NET: 489.9 mL          11-07    137  |  107  |  13  ----------------------------<  171<H>  4.1   |  20<L>  |  0.52    Ca    8.6      07 Nov 2020 00:26  Phos  2.3     11-07  Mg     2.3     11-07      [ x] Carrasco catheter, indication: hemodynamic status      HEMATOLOGIC  Meds: enoxaparin Injectable 40 milliGRAM(s) SubCutaneous daily    [x] VTE Prophylaxis                        9.5    12.42 )-----------( 405      ( 07 Nov 2020 00:26 )             30.8     PT/INR - ( 07 Nov 2020 00:26 )   PT: 14.0 sec;   INR: 1.18 ratio         PTT - ( 07 Nov 2020 00:26 )  PTT:28.0 sec  Transfusion     [ ] PRBC   [ ] Platelets   [ ] FFP   [ ] Cryoprecipitate      INFECTIOUS DISEASES  T(C): 37.2 (11-07-20 @ 03:00), Max: 37.7 (11-06-20 @ 08:00)  Wt(kg): --  WBC Count: 12.42 K/uL (11-07 @ 00:26)    Recent Cultures:  Specimen Source: .Urine Catheterized, 11-04 @ 05:52; Results   10,000 - 49,000 CFU/mL Escherichia coli; Gram Stain: --; Organism: Escherichia coli  Specimen Source: .Blood Blood-Arterial, 11-04 @ 05:22; Results   No growth to date.; Gram Stain: --; Organism: --    Meds: cefTRIAXone   IVPB      cefTRIAXone   IVPB 1000 milliGRAM(s) IV Intermittent every 24 hours        ENDOCRINE  Capillary Blood Glucose    Meds: atorvastatin 80 milliGRAM(s) Oral at bedtime        ACCESS DEVICES:  [x] Peripheral IV  [ ] Central Venous Line	[x ] R	[ ] L	[ ] IJ	[ ] Fem	[ ] SC	Placed:   [ ] Arterial Line		[ ] R	[x ] L	[ ] Fem	[ ] Rad	[ ] Ax	Placed:   [ ] PICC:					[ ] Mediport  [ ] Urinary Catheter, Date Placed:   [x] Necessity of urinary, arterial, and venous catheters discussed    OTHER MEDICATIONS:  chlorhexidine 0.12% Liquid 15 milliLiter(s) Oral Mucosa every 12 hours  chlorhexidine 2% Cloths 1 Application(s) Topical <User Schedule>  lidocaine   Patch 1 Patch Transdermal every 24 hours  nystatin Powder 1 Application(s) Topical two times a day      CODE STATUS: Yes        IMAGING: HISTORY  80F hx of dementia, hydrocephalus s/p  shunt and recent hip fracture non-operative management at SNF presents from SNF after a mechanical fall, level I trauma activated, primary survey significant for GCS 8, decision was made to intubate in trauma bay. Secondary survey significant for L forehead hematoma, R lower abdomen abrasion. CT scan significant for large L SDH with midline shift and L 8-10 rib fx. NSGY and family discussed, plan for non-operative management at this time. Patient was transferred to SICU for vent management and hemodynamic monitoring.    NSGY ligated  shunt at bedside. Repeat CT head was stable. Patient was briefly started on a cardene gtt for hypertensive urgency, goal SBP < 180. UA+ started on meropenem. Plan per NSGY to continue conservative management at this time.  KO tube feeds started on 11/4. Metoprolol 25Q12 started, Labetalol Q4PRN for HTN. Due to pcn allergy meropenum given for UTI than switched to Cefepime then to Ceftriaxone.  24 HOUR EVENTS:  - VTE added for DVT ppx  -LE duplex was negative for DVTs  -Mittens added to left hand      SUBJECTIVE/ROS:  [ ] A ten-point review of systems was otherwise negative except as noted.  [x ] Due to altered mental status/intubation, subjective information were not able to be obtained from the patient. History was obtained, to the extent possible, from review of the chart and collateral sources of information.      NEURO  GCS:6    Exam: obtunded, withdraws from pain  Meds: acetaminophen   Tablet .. 650 milliGRAM(s) Oral every 6 hours  fentaNYL    Injectable 25 MICROGram(s) IV Push every 2 hours PRN pain  levETIRAcetam  IVPB 500 milliGRAM(s) IV Intermittent every 12 hours    [x] Adequacy of sedation and pain control has been assessed and adjusted      RESPIRATORY  RR: 15 (11-07-20 @ 03:00) (12 - 50)  SpO2: 100% (11-07-20 @ 03:00) (96% - 100%)  Mechanical Ventilation: Mode: AC/ CMV (Assist Control/ Continuous Mandatory Ventilation), RR (machine): 12, RR (patient): 32, TV (machine): 400, FiO2: 30, PEEP: 5, ITime: 1, MAP: 7, PIP: 13  ABG - ( 06 Nov 2020 12:26 )  pH: 7.48  /  pCO2: 28    /  pO2: 135   / HCO3: 20    / Base Excess: -2.2  /  SaO2: 99      Lactate: 2.1 -- >1.7              [ ] Extubation Readiness Assessed  Meds:       CARDIOVASCULAR  HR: 72 (11-07-20 @ 03:00) (70 - 100)  BP: 145/67 (11-07-20 @ 03:00) (136/65 - 229/109)  BP(mean): 97 (11-07-20 @ 03:00) (93 - 157)  ABP: --  ABP(mean): --  Wt(kg): --  CVP(cm H2O): --  VBG - ( 07 Nov 2020 00:38 )  pH: 7.41  /  pCO2: 36    /  pO2: 69    / HCO3: 22    / Base Excess: -1.3  /  SaO2: 93     Lactate: 2.1                Cardiac Rhythm: regular  Perfusion     [ x]Adequate   [ ]Inadequate  Mentation   [ ]Normal       [x ]Reduced  Extremities  [ x]Warm         [ ]Cool  Volume Status [ ]Hypervolemic [x ]Euvolemic [ ]Hypovolemic  Meds: labetalol Injectable 10 milliGRAM(s) IV Push every 4 hours PRN SBP > 180  metoprolol tartrate 25 milliGRAM(s) Oral every 12 hours        GI/NUTRITION  Exam: Soft obese   Diet: Tube feeds  Meds: pantoprazole  Injectable 40 milliGRAM(s) IV Push daily      GENITOURINARY  I&O's Detail    11-05 @ 07:01  -  11-06 @ 07:00  --------------------------------------------------------  IN:    Enteral Tube Flush: 220 mL    IV PiggyBack: 100 mL    IV PiggyBack: 50 mL    IV PiggyBack: 399.9 mL    Jevity 1.2: 565 mL    sodium chloride 0.9%: 75 mL    sodium chloride 0.9%: 300 mL  Total IN: 1709.9 mL    OUT:    Ureteral Catheter (mL): 1110 mL  Total OUT: 1110 mL    Total NET: 599.9 mL      11-06 @ 07:01  -  11-07 @ 03:06  --------------------------------------------------------  IN:    Enteral Tube Flush: 130 mL    IV PiggyBack: 499.9 mL    Jevity 1.2: 715 mL    Pivot 1.5: 440 mL  Total IN: 1784.9 mL    OUT:    Indwelling Catheter - Urethral (mL): 745 mL    Ureteral Catheter (mL): 405 mL    Ureteral Catheter (mL): 145 mL  Total OUT: 1295 mL    Total NET: 489.9 mL          11-07    137  |  107  |  13  ----------------------------<  171<H>  4.1   |  20<L>  |  0.52    Ca    8.6      07 Nov 2020 00:26  Phos  2.3     11-07  Mg     2.3     11-07      [ x] Carrasco catheter, indication: hemodynamic status      HEMATOLOGIC  Meds: enoxaparin Injectable 40 milliGRAM(s) SubCutaneous daily    [x] VTE Prophylaxis                        9.5    12.42 )-----------( 405      ( 07 Nov 2020 00:26 )             30.8     PT/INR - ( 07 Nov 2020 00:26 )   PT: 14.0 sec;   INR: 1.18 ratio         PTT - ( 07 Nov 2020 00:26 )  PTT:28.0 sec  Transfusion     [ ] PRBC   [ ] Platelets   [ ] FFP   [ ] Cryoprecipitate      INFECTIOUS DISEASES  T(C): 37.2 (11-07-20 @ 03:00), Max: 37.7 (11-06-20 @ 08:00)  Wt(kg): --  WBC Count: 12.42 K/uL (11-07 @ 00:26)    Recent Cultures:  Specimen Source: .Urine Catheterized, 11-04 @ 05:52; Results   10,000 - 49,000 CFU/mL Escherichia coli; Gram Stain: --; Organism: Escherichia coli  Specimen Source: .Blood Blood-Arterial, 11-04 @ 05:22; Results   No growth to date.; Gram Stain: --; Organism: --    Meds: cefTRIAXone   IVPB      cefTRIAXone   IVPB 1000 milliGRAM(s) IV Intermittent every 24 hours        ENDOCRINE  Capillary Blood Glucose    Meds: atorvastatin 80 milliGRAM(s) Oral at bedtime        ACCESS DEVICES:  [x] Peripheral IV  [ ] Central Venous Line	[x ] R	[ ] L	[ ] IJ	[ ] Fem	[ ] SC	Placed:   [ ] Arterial Line		[ ] R	[x ] L	[ ] Fem	[ ] Rad	[ ] Ax	Placed:   [ ] PICC:					[ ] Mediport  [ ] Urinary Catheter, Date Placed:   [x] Necessity of urinary, arterial, and venous catheters discussed    OTHER MEDICATIONS:  chlorhexidine 0.12% Liquid 15 milliLiter(s) Oral Mucosa every 12 hours  chlorhexidine 2% Cloths 1 Application(s) Topical <User Schedule>  lidocaine   Patch 1 Patch Transdermal every 24 hours  nystatin Powder 1 Application(s) Topical two times a day      CODE STATUS: Yes        IMAGING: HISTORY  80F hx of dementia, hydrocephalus s/p  shunt and recent hip fracture non-operative management at SNF presents from SNF after a mechanical fall, level I trauma activated, primary survey significant for GCS 8, decision was made to intubate in trauma bay. Secondary survey significant for L forehead hematoma, R lower abdomen abrasion. CT scan significant for large L SDH with midline shift and L 8-10 rib fx. NSGY and family discussed, plan for non-operative management at this time. Patient was transferred to SICU for vent management and hemodynamic monitoring.    NSGY ligated  shunt at bedside. Repeat CT head was stable. Patient was briefly started on a cardene gtt for hypertensive urgency, goal SBP < 180. UA+ started on meropenem. Plan per NSGY to continue conservative management at this time.  KO tube feeds started on 11/4. Metoprolol 25Q12 started, Labetalol Q4PRN for HTN. Due to pcn allergy meropenum given for UTI than switched to Cefepime then to Ceftriaxone.    24 HOUR EVENTS:  - VTE added for DVT ppx, ok per NSGY  -LE duplex was negative for DVTs  -Mittens added to left hand  -purposeful movement LUE  -q2h neurochecks  -TF changed to pivot      SUBJECTIVE/ROS:  [ ] A ten-point review of systems was otherwise negative except as noted.  [x ] Due to altered mental status/intubation, subjective information were not able to be obtained from the patient. History was obtained, to the extent possible, from review of the chart and collateral sources of information.      NEURO  GCS:6    Exam: obtunded, withdraws from pain  Meds: acetaminophen   Tablet .. 650 milliGRAM(s) Oral every 6 hours  fentaNYL    Injectable 25 MICROGram(s) IV Push every 2 hours PRN pain  levETIRAcetam  IVPB 500 milliGRAM(s) IV Intermittent every 12 hours    [x] Adequacy of sedation and pain control has been assessed and adjusted      RESPIRATORY  RR: 15 (11-07-20 @ 03:00) (12 - 50)  SpO2: 100% (11-07-20 @ 03:00) (96% - 100%)  Mechanical Ventilation: Mode: AC/ CMV (Assist Control/ Continuous Mandatory Ventilation), RR (machine): 12, RR (patient): 32, TV (machine): 400, FiO2: 30, PEEP: 5, ITime: 1, MAP: 7, PIP: 13  ABG - ( 06 Nov 2020 12:26 )  pH: 7.48  /  pCO2: 28    /  pO2: 135   / HCO3: 20    / Base Excess: -2.2  /  SaO2: 99      Lactate: 2.1 -- >1.7              [ ] Extubation Readiness Assessed  Meds:       CARDIOVASCULAR  HR: 72 (11-07-20 @ 03:00) (70 - 100)  BP: 145/67 (11-07-20 @ 03:00) (136/65 - 229/109)  BP(mean): 97 (11-07-20 @ 03:00) (93 - 157)  ABP: --  ABP(mean): --  Wt(kg): --  CVP(cm H2O): --  VBG - ( 07 Nov 2020 00:38 )  pH: 7.41  /  pCO2: 36    /  pO2: 69    / HCO3: 22    / Base Excess: -1.3  /  SaO2: 93     Lactate: 2.1                Cardiac Rhythm: regular  Perfusion     [ x]Adequate   [ ]Inadequate  Mentation   [ ]Normal       [x ]Reduced  Extremities  [ x]Warm         [ ]Cool  Volume Status [ ]Hypervolemic [x ]Euvolemic [ ]Hypovolemic  Meds: labetalol Injectable 10 milliGRAM(s) IV Push every 4 hours PRN SBP > 180  metoprolol tartrate 25 milliGRAM(s) Oral every 12 hours        GI/NUTRITION  Exam: Soft obese   Diet: Tube feeds  Meds: pantoprazole  Injectable 40 milliGRAM(s) IV Push daily      GENITOURINARY  I&O's Detail    11-05 @ 07:01  -  11-06 @ 07:00  --------------------------------------------------------  IN:    Enteral Tube Flush: 220 mL    IV PiggyBack: 100 mL    IV PiggyBack: 50 mL    IV PiggyBack: 399.9 mL    Jevity 1.2: 565 mL    sodium chloride 0.9%: 75 mL    sodium chloride 0.9%: 300 mL  Total IN: 1709.9 mL    OUT:    Ureteral Catheter (mL): 1110 mL  Total OUT: 1110 mL    Total NET: 599.9 mL      11-06 @ 07:01  -  11-07 @ 03:06  --------------------------------------------------------  IN:    Enteral Tube Flush: 130 mL    IV PiggyBack: 499.9 mL    Jevity 1.2: 715 mL    Pivot 1.5: 440 mL  Total IN: 1784.9 mL    OUT:    Indwelling Catheter - Urethral (mL): 745 mL    Ureteral Catheter (mL): 405 mL    Ureteral Catheter (mL): 145 mL  Total OUT: 1295 mL    Total NET: 489.9 mL          11-07    137  |  107  |  13  ----------------------------<  171<H>  4.1   |  20<L>  |  0.52    Ca    8.6      07 Nov 2020 00:26  Phos  2.3     11-07  Mg     2.3     11-07      [ x] Carrasco catheter, indication: hemodynamic status      HEMATOLOGIC  Meds: enoxaparin Injectable 40 milliGRAM(s) SubCutaneous daily    [x] VTE Prophylaxis                        9.5    12.42 )-----------( 405      ( 07 Nov 2020 00:26 )             30.8     PT/INR - ( 07 Nov 2020 00:26 )   PT: 14.0 sec;   INR: 1.18 ratio         PTT - ( 07 Nov 2020 00:26 )  PTT:28.0 sec  Transfusion     [ ] PRBC   [ ] Platelets   [ ] FFP   [ ] Cryoprecipitate      INFECTIOUS DISEASES  T(C): 37.2 (11-07-20 @ 03:00), Max: 37.7 (11-06-20 @ 08:00)  Wt(kg): --  WBC Count: 12.42 K/uL (11-07 @ 00:26)    Recent Cultures:  Specimen Source: .Urine Catheterized, 11-04 @ 05:52; Results   10,000 - 49,000 CFU/mL Escherichia coli; Gram Stain: --; Organism: Escherichia coli  Specimen Source: .Blood Blood-Arterial, 11-04 @ 05:22; Results   No growth to date.; Gram Stain: --; Organism: --    Meds: cefTRIAXone   IVPB      cefTRIAXone   IVPB 1000 milliGRAM(s) IV Intermittent every 24 hours        ENDOCRINE  Capillary Blood Glucose    Meds: atorvastatin 80 milliGRAM(s) Oral at bedtime        ACCESS DEVICES:  [x] Peripheral IV  [ ] Central Venous Line	[x ] R	[ ] L	[ ] IJ	[ ] Fem	[ ] SC	Placed:   [ ] Arterial Line		[ ] R	[x ] L	[ ] Fem	[ ] Rad	[ ] Ax	Placed:   [ ] PICC:					[ ] Mediport  [ ] Urinary Catheter, Date Placed:   [x] Necessity of urinary, arterial, and venous catheters discussed    OTHER MEDICATIONS:  chlorhexidine 0.12% Liquid 15 milliLiter(s) Oral Mucosa every 12 hours  chlorhexidine 2% Cloths 1 Application(s) Topical <User Schedule>  lidocaine   Patch 1 Patch Transdermal every 24 hours  nystatin Powder 1 Application(s) Topical two times a day      CODE STATUS: Yes        IMAGING: HISTORY  80F hx of dementia, hydrocephalus s/p  shunt and recent hip fracture non-operative management at SNF presents from SNF after a mechanical fall, level I trauma activated, primary survey significant for GCS 8, decision was made to intubate in trauma bay. Secondary survey significant for L forehead hematoma, R lower abdomen abrasion. CT scan significant for large L SDH with midline shift and L 8-10 rib fx. NSGY and family discussed, plan for non-operative management at this time. Patient was transferred to SICU for vent management and hemodynamic monitoring.    NSGY ligated  shunt at bedside. Repeat CT head was stable. Patient was briefly started on a cardene gtt for hypertensive urgency, goal SBP < 180. UA+ started on meropenem. Plan per NSGY to continue conservative management at this time.  KO tube feeds started on 11/4. Metoprolol 25Q12 started, Labetalol Q4PRN for HTN. Due to pcn allergy meropenum given for UTI than switched to Cefepime then to Ceftriaxone.    24 HOUR EVENTS:  - VTE added for DVT ppx, ok per NSGY  -LE duplex was negative for DVTs  -Mittens added to left hand  -purposeful movement LUE  -q2h neurochecks  -TF changed to pivot  -abx changed to CTX based on sensitivities, plan to continue for 5 days total  - tidal volume increased to 400    SUBJECTIVE/ROS:  [ ] A ten-point review of systems was otherwise negative except as noted.  [x ] Due to altered mental status/intubation, subjective information were not able to be obtained from the patient. History was obtained, to the extent possible, from review of the chart and collateral sources of information.      NEURO  GCS:   Exam: obtunded, withdraws from pain, purposeful movement LUE, not following commands  Meds: acetaminophen   Tablet .. 650 milliGRAM(s) Oral every 6 hours  fentaNYL    Injectable 25 MICROGram(s) IV Push every 2 hours PRN pain  levETIRAcetam  IVPB 500 milliGRAM(s) IV Intermittent every 12 hours    [x] Adequacy of sedation and pain control has been assessed and adjusted      RESPIRATORY  RR: 15 (11-07-20 @ 03:00) (12 - 50)  SpO2: 100% (11-07-20 @ 03:00) (96% - 100%)  Mechanical Ventilation: Mode: AC/ CMV (Assist Control/ Continuous Mandatory Ventilation), RR (machine): 12, RR (patient): 32, TV (machine): 400, FiO2: 30, PEEP: 5, ITime: 1, MAP: 7, PIP: 13  ABG - ( 06 Nov 2020 12:26 )  pH: 7.48  /  pCO2: 28    /  pO2: 135   / HCO3: 20    / Base Excess: -2.2  /  SaO2: 99      Lactate: 2.1 -- >1.7              [ ] Extubation Readiness Assessed  Meds:       CARDIOVASCULAR  HR: 72 (11-07-20 @ 03:00) (70 - 100)  BP: 145/67 (11-07-20 @ 03:00) (136/65 - 229/109)  BP(mean): 97 (11-07-20 @ 03:00) (93 - 157)  ABP: --  ABP(mean): --  Wt(kg): --  CVP(cm H2O): --  VBG - ( 07 Nov 2020 00:38 )  pH: 7.41  /  pCO2: 36    /  pO2: 69    / HCO3: 22    / Base Excess: -1.3  /  SaO2: 93     Lactate: 2.1                Cardiac Rhythm: regular  Perfusion     [ x]Adequate   [ ]Inadequate  Mentation   [ ]Normal       [x ]Reduced  Extremities  [ x]Warm         [ ]Cool  Volume Status [ ]Hypervolemic [x ]Euvolemic [ ]Hypovolemic  Meds: labetalol Injectable 10 milliGRAM(s) IV Push every 4 hours PRN SBP > 180  metoprolol tartrate 25 milliGRAM(s) Oral every 12 hours        GI/NUTRITION  Exam: Soft obese   Diet: Tube feeds  Meds: pantoprazole  Injectable 40 milliGRAM(s) IV Push daily      GENITOURINARY  I&O's Detail    11-05 @ 07:01  -  11-06 @ 07:00  --------------------------------------------------------  IN:    Enteral Tube Flush: 220 mL    IV PiggyBack: 100 mL    IV PiggyBack: 50 mL    IV PiggyBack: 399.9 mL    Jevity 1.2: 565 mL    sodium chloride 0.9%: 75 mL    sodium chloride 0.9%: 300 mL  Total IN: 1709.9 mL    OUT:    Ureteral Catheter (mL): 1110 mL  Total OUT: 1110 mL    Total NET: 599.9 mL      11-06 @ 07:01  -  11-07 @ 03:06  --------------------------------------------------------  IN:    Enteral Tube Flush: 130 mL    IV PiggyBack: 499.9 mL    Jevity 1.2: 715 mL    Pivot 1.5: 440 mL  Total IN: 1784.9 mL    OUT:    Indwelling Catheter - Urethral (mL): 745 mL    Ureteral Catheter (mL): 405 mL    Ureteral Catheter (mL): 145 mL  Total OUT: 1295 mL    Total NET: 489.9 mL          11-07    137  |  107  |  13  ----------------------------<  171<H>  4.1   |  20<L>  |  0.52    Ca    8.6      07 Nov 2020 00:26  Phos  2.3     11-07  Mg     2.3     11-07      [ x] Carrasco catheter, indication: hemodynamic status      HEMATOLOGIC  Meds: enoxaparin Injectable 40 milliGRAM(s) SubCutaneous daily    [x] VTE Prophylaxis                        9.5    12.42 )-----------( 405      ( 07 Nov 2020 00:26 )             30.8     PT/INR - ( 07 Nov 2020 00:26 )   PT: 14.0 sec;   INR: 1.18 ratio         PTT - ( 07 Nov 2020 00:26 )  PTT:28.0 sec  Transfusion     [ ] PRBC   [ ] Platelets   [ ] FFP   [ ] Cryoprecipitate      INFECTIOUS DISEASES  T(C): 37.2 (11-07-20 @ 03:00), Max: 37.7 (11-06-20 @ 08:00)  Wt(kg): --  WBC Count: 12.42 K/uL (11-07 @ 00:26)    Recent Cultures:  Specimen Source: .Urine Catheterized, 11-04 @ 05:52; Results   10,000 - 49,000 CFU/mL Escherichia coli; Gram Stain: --; Organism: Escherichia coli  Specimen Source: .Blood Blood-Arterial, 11-04 @ 05:22; Results   No growth to date.; Gram Stain: --; Organism: --    Meds: cefTRIAXone   IVPB      cefTRIAXone   IVPB 1000 milliGRAM(s) IV Intermittent every 24 hours        ENDOCRINE  Capillary Blood Glucose    Meds: atorvastatin 80 milliGRAM(s) Oral at bedtime        ACCESS DEVICES:  [x] Peripheral IV  [ ] Central Venous Line	[x ] R	[ ] L	[ ] IJ	[ ] Fem	[ ] SC	Placed:   [ ] Arterial Line		[ ] R	[x ] L	[ ] Fem	[ ] Rad	[ ] Ax	Placed:   [ ] PICC:					[ ] Mediport  [x] Urinary Catheter, Date Placed:   [x] Necessity of urinary, arterial, and venous catheters discussed    OTHER MEDICATIONS:  chlorhexidine 0.12% Liquid 15 milliLiter(s) Oral Mucosa every 12 hours  chlorhexidine 2% Cloths 1 Application(s) Topical <User Schedule>  lidocaine   Patch 1 Patch Transdermal every 24 hours  nystatin Powder 1 Application(s) Topical two times a day      CODE STATUS: Yes        IMAGING:

## 2020-11-07 NOTE — PROGRESS NOTE ADULT - SUBJECTIVE AND OBJECTIVE BOX
Patient seen and examined at bedside.    --Anticoagulation--  enoxaparin Injectable 40 milliGRAM(s) SubCutaneous daily    Vital Signs Last 24 Hrs  T(C): 37.3 (07 Nov 2020 07:00), Max: 37.7 (06 Nov 2020 11:00)  T(F): 99.1 (07 Nov 2020 07:00), Max: 99.9 (06 Nov 2020 11:00)  HR: 79 (07 Nov 2020 07:00) (70 - 100)  BP: 177/85 (07 Nov 2020 07:00) (136/65 - 229/109)  BP(mean): 117 (07 Nov 2020 07:00) (93 - 157)  RR: 24 (07 Nov 2020 07:00) (12 - 50)  SpO2: 100% (07 Nov 2020 07:00) (96% - 100%)    Exam:  intubated, pupils 3R, LUE localizes, RUE w/d, RLE TF vs w/d, LLE w/d

## 2020-11-07 NOTE — PROGRESS NOTE ADULT - ASSESSMENT
80 year old female, with a medical history significant for dementia, not on A/C, BIBEMS unresponsive with head injury, reportedly fell, intubated in the ED to protect airway (GS=8), imaging studies showed left acute SDH, repeat CT after  shunt ligation shows stable SDH and left to right shift. CT head 4/11 revealed decreased subdural hematoma and midline shift 11mm-> 7 mm. New low density cerebellar focus suspicious for infarcts.       Plan:   - Pain control   - c/w intubation and mechanical ventilation  - Appreciate neurosurgery:  Conservative management.   - NPO   - Follow up with family meeting discussing GOC  - Appreciate SICU care    ACS  [id number]

## 2020-11-07 NOTE — PROVIDER CONTACT NOTE (OTHER) - SITUATION
Pt hypertensive to 229/109@0200, labetalol 10mg IV push given-minimal response, SBP down to 136/65 s/p 25mcg fentanyl IV push

## 2020-11-07 NOTE — PROGRESS NOTE ADULT - ASSESSMENT
79 y/o  woman with dementia, hydrocephalus s/p VPS admitted after fall at nursing facility with LOC and unresponsiveness.  Initial CTH with acute SDH L>R and on interhemispheric ffissure and tentorial leaves with rightward shift 11mm decreased to 7mm. Repeat CTH with new R inferior cerebellar hypodensity concerning for infarct. Neuro exam without appreciable interval change: intubated on UE restraints, no verbal output, not following commands, movement of extremities L>R w/ noxious stimuli.    Recommendations:  [x] Hold antiplatelets given SDH; consider when cleared from nsx standpoint.  [x] Lipitor 80mg can be reduced to 40mg  [x] c/w Keppra 500mg BID x 7 days.   [x] LDL 84, HbA1C 5.6  [x] Telemetry monitoring  [x] SBP goal normotensive, less than 160/90.   [ ] rEEG, TTE, MRI/MRA when able if in agreement with GOC of family  [ ] LE dopplers  [ ] GOC discussion pending, likely needs trach/PEG

## 2020-11-07 NOTE — PROGRESS NOTE ADULT - SUBJECTIVE AND OBJECTIVE BOX
SUBJECTIVE: patient still intubated,     INTERVAL HISTORY: current  systolic.    PAST MEDICAL & SURGICAL HISTORY:  H/O hydrocephalus  Dementia  S/P ventriculoperitoneal shunt    FAMILY HISTORY:    MEDICATIONS (HOME):  Home Medications:  Tylenol 325 mg oral tablet: 2 tab(s) orally every 6 hours, As Needed (04 Nov 2020 01:50)    MEDICATIONS  (STANDING):  acetaminophen   Tablet .. 650 milliGRAM(s) Oral every 6 hours  atorvastatin 80 milliGRAM(s) Oral at bedtime  cefTRIAXone   IVPB      cefTRIAXone   IVPB 1000 milliGRAM(s) IV Intermittent every 24 hours  chlorhexidine 0.12% Liquid 15 milliLiter(s) Oral Mucosa every 12 hours  chlorhexidine 2% Cloths 1 Application(s) Topical <User Schedule>  enoxaparin Injectable 40 milliGRAM(s) SubCutaneous daily  levETIRAcetam  IVPB 500 milliGRAM(s) IV Intermittent every 12 hours  lidocaine   Patch 1 Patch Transdermal every 24 hours  metoprolol tartrate 25 milliGRAM(s) Oral every 12 hours  nystatin Powder 1 Application(s) Topical two times a day  pantoprazole  Injectable 40 milliGRAM(s) IV Push daily  sodium chloride 1 Gram(s) Oral every 8 hours    MEDICATIONS  (PRN):  fentaNYL    Injectable 25 MICROGram(s) IV Push every 2 hours PRN pain  labetalol Injectable 10 milliGRAM(s) IV Push every 4 hours PRN SBP > 180    ALLERGIES/INTOLERANCES:  Allergies  penicillin (Unknown)    Intolerances    VITALS & EXAMINATION:  Vital Signs Last 24 Hrs  T(C): 37.4 (07 Nov 2020 11:00), Max: 37.4 (06 Nov 2020 19:00)  T(F): 99.3 (07 Nov 2020 11:00), Max: 99.3 (06 Nov 2020 19:00)  HR: 89 (07 Nov 2020 11:00) (70 - 100)  BP: 191/86 (07 Nov 2020 11:00) (136/65 - 229/109)  BP(mean): 123 (07 Nov 2020 11:00) (93 - 157)  RR: 25 (07 Nov 2020 11:00) (12 - 50)  SpO2: 100% (07 Nov 2020 11:00) (96% - 100%)    General Exam:   Constitutional: Appears stated age, intubated.   Neurological (>12):  MS: Intubated, not following commands, no response to vocal stimulation. Grimaces to sternal rub.   Language: Intubated.   CNs: (R = 2mm, L = 2mm) Poorly reactive, right eye exotropia.  No noted blink to threat. Corneal reflex intact. No facial asymmetry b/l.   Motor: Increased tone in the LLE in extension. no tremors at this time.  Sensory: Withdrawal to noxious stimuli in all 4 extremities LLE>RLE. Reduced withdrawal in the LLE.   Coordination: unable to test  Gait: Deferred     LABORATORY:  CBC                       9.5    12.42 )-----------( 405      ( 07 Nov 2020 00:26 )             30.8     Chem 11-07    137  |  107  |  13  ----------------------------<  171<H>  4.1   |  20<L>  |  0.52    Ca    8.6      07 Nov 2020 00:26  Phos  2.3     11-07  Mg     2.3     11-07      LFTs   Coagulopathy PT/INR - ( 07 Nov 2020 00:26 )   PT: 14.0 sec;   INR: 1.18 ratio         PTT - ( 07 Nov 2020 00:26 )  PTT:28.0 sec  Lipid Panel 11-05 Chol 147 LDL -- HDL 40<L> Trig 115  A1c   Cardiac enzymes     U/A   CSF  Immunological  Other    STUDIES & IMAGING:  Studies (EKG, EEG, EMG, etc):     Radiology (XR, CT, MR, U/S, TTE/JUNAID):    < from: CT Head No Cont (11.04.20 @ 13:13) >  FINDINGS:    Redemonstration of right parietal approach ventriculostomy catheter in unchanged position.    Acute left lateral convexity subdural hemorrhage measures 1.7 cm in greatest depth, decreased from 1.9 cm.    Acute right lateral convexity subdural hemorrhage measures 6 mm in greatest depth, similar to the prior examination.    Acute subdural hemorrhage in the interhemispheric fissure measures 9 mm in greatest thickness, previously measuring 11 mm    Acute subdural hemorrhage along the tentorial leaves is similar, measuring4 to 5 mm in greatest thickness on the left.    Rightward midline shift of 7 mm is decreased from 11 mm. Increased ventricular size, no large hydrocephalus. Basal cisterns are more well visualized on the current examination.    Similar nonspecific 6 mm focus of increased attenuation in the left corona radiata (2-22).    New foci of low density in the right inferior cerebellar hemisphere, suspicious for acute infarction. No CT evidence for hemorrhagic transformation.    Similar extensive white matter microvascular ischemic disease.    No displaced calvarial fracture. The visualized paranasal sinuses and mastoid air cells are clear.    IMPRESSION:  New foci of low density in the right inferior cerebellar hemisphere, suspicious for acute infarction. No CT evidence for hemorrhagic transformation.    Decreased size of left lateral convexity subdural hemorrhage. Similar size of right lateral convexity subdural hemorrhage.    Decreased size of interhemispheric subdural hemorrhage. Similar tentorial subdural hemorrhage.    Decreased rightward midline shift, currently 7 mm, previously 11 mm.    Increased ventricular size, no large hydrocephalus. Basal cisterns are more well visualized on the current examination.    < end of copied text >    < from: CT Head No Cont (11.03.20 @ 23:29) >  Bilateral supratentorial subdural hematomas are again identified. The subdural hematoma on the left side measures approximately 2.2 cm in widest diameter and on the right side measures approximately 0.5 cm in widest diameter. Acute subdural hematoma along the interhemispheric region is seen along the left side and measures approximately 0.9 cm widest diameter. Acute subdural hematomas along bilateral tentorial region are again seen as well. There is mass effect on left lateral ventricle. Left-to-right shift (1.1 cm) is again seen.    Right parietal shunt catheter is again seen. This catheter appears unchanged in position.    Evaluation of the osseous structures with the appropriate window appears normal    The visualized paranasal sinuses mastoid and middle ear regions appear clear.    IMPRESSION: No stiffing change when allowing for differences in technique.    < end of copied text >

## 2020-11-07 NOTE — PROGRESS NOTE ADULT - ATTENDING COMMENTS
S/p fall at home with largr lt SDH with shift, small lt SDH  F/U CT stable lt SDH with decreasing midline shift, with new rt cerebellar infarct    No significant neurologic recovery so far, h/o dementia and hydrocephalus  Continue Keppra for seizure prophylaxis  BP goal -180 , on Lopressor  Vent adjustment for resp alkalosis, on PS  Tube feed at goal  Add salt tab to keep serum Na > 140  On chemical DVT ppx,  surveillance duplex of LE neg for DVT  Pan sensitive E coli UTI, on 5 day  CTX course  Monitor glycemia.  MSK L 8-10 rib fractures. Recent L hip fx, non operative management  Son kept updated, pt is DNR

## 2020-11-07 NOTE — CHART NOTE - NSCHARTNOTEFT_GEN_A_CORE
EEG preliminary read (not final):  On the initial hour(s) x 2 of recording.   Moderate slowing noted, worse over left with left sided attenuation  Intermittent left C3 P3 LPDs near 1hz indicating elevated risk for focal onset seizures.  No seizures recorded.    Final report to follow in morning tomorrow after completion of study.    [facility] EEG Reading Room Ph#: [phone]  Epilepsy Answering Service after 5PM and before 8:30AM: Ph#: [phone]

## 2020-11-07 NOTE — PROGRESS NOTE ADULT - ATTENDING COMMENTS
seen and examined 11-07-20 @ 0859    intubated on mechanical vent (spont 0 / +5 / 30%)  on Vital 1.5 @ 55 mL/hr  off sedation  c-collar in place  PERRL  opens eyes and now localizing pain  5 mm left forehead wound without evidence of infection    left holohemispheric / parafalcine / tentorial SDH  right  shunt ligated by neurosurgery  -stable on 11/4 1305 repeat CT head  -serial neuro exams  -started chemical VTE prophylaxis on 11/6  -very poor prognosis, so f/u palliative care consult

## 2020-11-07 NOTE — PROGRESS NOTE ADULT - ASSESSMENT
80F hx of dementia, hydrocephalus s/p  shunt and recent hip fracture non-operative management at SNF presents from SNF after a mechanical fall, level I trauma activated, primary survey significant for GCS 8, decision was made to intubate in trauma bay. Secondary survey significant for L forehead hematoma, R lower abdomen abrasion. CT scan significant for large L SDH with midline shift and L 8-10 rib fx. NSGY and family discussed, plan for non-operative management at this time.   CT head 4/11 revealed decreased subdural hematoma and midline shift 11mm-> 7 mm. New low density cerebellar focus suspicious for infarcts.   As per neurosurgery note "GOC discussion pending, likely needs trach/PEG"    Neuro:  - Repeat CT head with new cerebellar infraction + stable/less subdural bleeds decreased rightward midline shift, currently 7 mm, previously 11 mm.  - Keppra 500 BID  - Off all sedation  - Fentanyl pushes for pain Q2 + Ativan for anxiety       Resp: L 8-10 rib fx  - Intubated, 12/370/30/5      CV: Goal SBP <200  - Off cardene gtt    -home atrovostatin  - Metoprolol 25mg Q12, Labetalol Q4 PRN    GI:  - KO placed, tube feeds started 11/6  on Jevity 1.2 @ 65 mL/hr  - Liver mass (+ renal mass) finding discussed with son     :   - Carrasco for hemodynamic monitoring     Heme:  - Lovenox VTE ppx      ID: UTI  - switched to Ceftriaxone for UTI instead of cefepime    Endo:  - Monitor glucose on BMP    MSK: L hip fx  - No hip fractures of xray     Dispo:  - SICU  - DNR   80F hx of dementia, hydrocephalus s/p  shunt and recent hip fracture non-operative management at SNF presents from SNF after a mechanical fall, level I trauma activated, primary survey significant for GCS 8, decision was made to intubate in trauma bay. Secondary survey significant for L forehead hematoma, R lower abdomen abrasion. CT scan significant for large L SDH with midline shift and L 8-10 rib fx. NSGY and family discussed, plan for non-operative management at this time.   CT head 4/11 revealed decreased subdural hematoma and midline shift 11mm-> 7 mm. New low density cerebellar focus suspicious for infarcts.   As per neurosurgery note "GOC discussion pending, likely needs trach/PEG"    Neuro:  - Repeat CT head with new cerebellar infraction + stable/less subdural bleeds decreased rightward midline shift, currently 7 mm, previously 11 mm.  - Keppra 500 BID  - Off all sedation  - Fentanyl pushes for pain Q2 + Ativan for anxiety   - NSGY to reassess for possible hazel hole to drain SDH on hospital day 10    Resp: L 8-10 rib fx  - Intubated, 12/400/30/5    CV: Goal SBP <200  -home atorvastatin  - Metoprolol 25mg Q12, Labetalol Q4 PRN    GI:  - marva feeding tube placed, tube feeds started 11/6 pivot @ 55  - Liver mass (+ renal mass) finding discussed with son     :   - Carrasco for hemodynamic monitoring     Heme:  - Lovenox VTE ppx started 11/6  - BLE duplex negative for DVT 11/6    ID: UTI  - switched to Ceftriaxone for UTI instead of cefepime    Endo:  - Monitor glucose on BMP    MSK: L hip fx  - No hip fractures of xray     Dispo:  - SICU  - DNR

## 2020-11-07 NOTE — PROVIDER CONTACT NOTE (OTHER) - ASSESSMENT
Pt obtunded, not following commands, withdraws to pain, intubated on vent FiO2 30%, w/ VS as charted. Pt restless, tense, overbreathing on vent, given fentanyl IV push for pain. Pt running 500ml NS IV bolus and 15mmol sodium phosphate IV.

## 2020-11-07 NOTE — PROGRESS NOTE ADULT - ASSESSMENT
80F PMHx NPH s/p Shunt approx 10 years ago, recent hip Fx, found down at rehab today, CT head in ED shows acute left, interhemispheric, small R SDH.  shunt ligated at clavicle bedside in SICU, repeat CT after ligation shows stable SDH  - further mgmt plans pending GOC and montioring of neuro status.   -WBC 12.42, Hb 9.5, Plt 405, INR 1.18  -Ceftriaxone x 5 days total   -ON Hypertensive to 229/109, got labetalol 10mg IVP, fentanyl 25mcg, BP returned to 136/65.  - LED neg 11/6  - ok for dvt ppx   - Neurology saw, recc'd stroke w/u CTA vs MRA, MRI brain when able depending on family wishes/ GOC  - WBC 14 stable, INR 1.25 (goal <1.3)  - shunt ligated at clavicle 11/3, site c/d/i  - Made DNR  - now off all sedation  - 12 hr repeat HCT 11/4 dec heme, new small R cerebellar infarct (embolic vs hypercoag)  - keppra 500 BID  - ADM trauma for L rib fx  - CT also c/f metastatic RCC  - Cefepime for UTI

## 2020-11-07 NOTE — PROGRESS NOTE ADULT - SUBJECTIVE AND OBJECTIVE BOX
SURGERY PROGRESS NOTE    SUBJECTIVE / 24H EVENTS:  Patient seen and examined on morning rounds. No acute events overnight. Now localizing to pain on LUE. L hand now in mitten coby to trying to pull at tubes overnight.       OBJECTIVE:  VITAL SIGNS:  T(C): 37.4 (20 @ 11:00), Max: 37.4 (20 @ 19:00)  HR: 84 (20 @ 13:00) (70 - 100)  BP: 186/81 (20 @ 13:00) (136/65 - 229/109)  RR: 21 (20:00) (12 - 50)  SpO2: 100% (20:00) (96% - 100%)  Daily     Daily Weight in k.2 (2020 06:)      PHYSICAL EXAM:  General: intubated, appears to be in NAD  Chest: Intubated on mechanical ventilation.   Neuro:  not following commands, withdrawing to pain in bilateral lower extremities   Abdomen: soft, nontender, nondistended           20 @ 07:01  -  20 @ 07:00  --------------------------------------------------------  IN:    Enteral Tube Flush: 180 mL    IV PiggyBack: 849.9 mL    Jevity 1.2: 715 mL    Pivot 1.5: 715 mL    Sodium Chloride 0.9% Bolus: 500 mL  Total IN: 2959.9 mL    OUT:    Indwelling Catheter - Urethral (mL): 1135 mL    Ureteral Catheter (mL): 405 mL    Ureteral Catheter (mL): 145 mL  Total OUT: 1685 mL    Total NET: 1274.9 mL      20 @ 07:01  -  20 @ 13:11  --------------------------------------------------------  IN:    Pivot 1.5: 220 mL  Total IN: 220 mL    OUT:    Indwelling Catheter - Urethral (mL): 190 mL  Total OUT: 190 mL    Total NET: 30 mL          LAB VALUES:      137  |  107  |  13  ----------------------------<  171<H>  4.1   |  20<L>  |  0.52    Ca    8.6      2020 00:26  Phos  2.3       Mg     2.3                                      9.5    12.42 )-----------( 405      ( 2020 00:26 )             30.8       PT/INR - ( 2020 00:26 )   PT: 14.0 sec;   INR: 1.18 ratio         PTT - ( 2020 00:26 )  PTT:28.0 sec  ABG - ( 2020 11:18 )  pH, Arterial: 7.46  pH, Blood: x     /  pCO2: 32    /  pO2: 135   / HCO3: 23    / Base Excess: .1    /  SaO2: 99                        MICROBIOLOGY:      RADIOLOGY:  PACS Image: Image(s) Available (20 @ 06:58)        MEDICATIONS  (STANDING):  acetaminophen   Tablet .. 650 milliGRAM(s) Oral every 6 hours  atorvastatin 80 milliGRAM(s) Oral at bedtime  cefTRIAXone   IVPB      cefTRIAXone   IVPB 1000 milliGRAM(s) IV Intermittent every 24 hours  chlorhexidine 0.12% Liquid 15 milliLiter(s) Oral Mucosa every 12 hours  chlorhexidine 2% Cloths 1 Application(s) Topical <User Schedule>  enoxaparin Injectable 40 milliGRAM(s) SubCutaneous daily  levETIRAcetam  IVPB 500 milliGRAM(s) IV Intermittent every 12 hours  lidocaine   Patch 1 Patch Transdermal every 24 hours  metoprolol tartrate 25 milliGRAM(s) Oral every 12 hours  nystatin Powder 1 Application(s) Topical two times a day  pantoprazole  Injectable 40 milliGRAM(s) IV Push daily  sodium chloride 1 Gram(s) Oral every 8 hours    MEDICATIONS  (PRN):  fentaNYL    Injectable 25 MICROGram(s) IV Push every 2 hours PRN pain  labetalol Injectable 10 milliGRAM(s) IV Push every 4 hours PRN SBP > 180        SURGERY PROGRESS NOTE    SUBJECTIVE / 24H EVENTS:  Patient seen and examined on morning rounds. No acute events overnight. Now opening eyes and localizing to pain with LUE. L hand now in mitten coby to trying to pull at tubes overnight.     24 HOUR EVENTS:  - VTE added for DVT ppx, ok per NSGY  -LE duplex was negative for DVTs  -Mittens added to left hand  -purposeful movement LUE  -q2h neurochecks  -TF changed to pivot  -abx changed to CTX based on sensitivities, plan to continue for 5 days total  - tidal volume increased to 400    OBJECTIVE:  VITAL SIGNS:  T(C): 37.4 (20 @ 11:00), Max: 37.4 (20 @ 19:00)  HR: 84 (20 @ 13:00) (70 - 100)  BP: 186/81 (20 @ 13:00) (136/65 - 229/109)  RR: 21 (20:00) (12 - 50)  SpO2: 100% (20 @ :00) (96% - 100%)  Daily     Daily Weight in k.2 (2020 06:00)      PHYSICAL EXAM:  General: intubated, appears to be in NAD  Chest: Intubated on mechanical ventilation.   Neuro:  not following commands, withdrawing to pain in bilateral lower extremities   Abdomen: soft, nontender, nondistended           20 @ 07:01  -  20 @ 07:00  --------------------------------------------------------  IN:    Enteral Tube Flush: 180 mL    IV PiggyBack: 849.9 mL    Jevity 1.2: 715 mL    Pivot 1.5: 715 mL    Sodium Chloride 0.9% Bolus: 500 mL  Total IN: 2959.9 mL    OUT:    Indwelling Catheter - Urethral (mL): 1135 mL    Ureteral Catheter (mL): 405 mL    Ureteral Catheter (mL): 145 mL  Total OUT: 1685 mL    Total NET: 1274.9 mL      20 @ 07:01  -  11-07-20 @ 13:11  --------------------------------------------------------  IN:    Pivot 1.5: 220 mL  Total IN: 220 mL    OUT:    Indwelling Catheter - Urethral (mL): 190 mL  Total OUT: 190 mL    Total NET: 30 mL          LAB VALUES:      137  |  107  |  13  ----------------------------<  171<H>  4.1   |  20<L>  |  0.52    Ca    8.6      2020 00:26  Phos  2.3       Mg     2.3                                      9.5    12.42 )-----------( 405      ( 2020 00:26 )             30.8       PT/INR - ( 2020 00:26 )   PT: 14.0 sec;   INR: 1.18 ratio         PTT - ( 2020 00:26 )  PTT:28.0 sec  ABG - ( 2020 11:18 )  pH, Arterial: 7.46  pH, Blood: x     /  pCO2: 32    /  pO2: 135   / HCO3: 23    / Base Excess: .1    /  SaO2: 99                        MICROBIOLOGY:      RADIOLOGY:  PACS Image: Image(s) Available (20 @ 06:58)        MEDICATIONS  (STANDING):  acetaminophen   Tablet .. 650 milliGRAM(s) Oral every 6 hours  atorvastatin 80 milliGRAM(s) Oral at bedtime  cefTRIAXone   IVPB      cefTRIAXone   IVPB 1000 milliGRAM(s) IV Intermittent every 24 hours  chlorhexidine 0.12% Liquid 15 milliLiter(s) Oral Mucosa every 12 hours  chlorhexidine 2% Cloths 1 Application(s) Topical <User Schedule>  enoxaparin Injectable 40 milliGRAM(s) SubCutaneous daily  levETIRAcetam  IVPB 500 milliGRAM(s) IV Intermittent every 12 hours  lidocaine   Patch 1 Patch Transdermal every 24 hours  metoprolol tartrate 25 milliGRAM(s) Oral every 12 hours  nystatin Powder 1 Application(s) Topical two times a day  pantoprazole  Injectable 40 milliGRAM(s) IV Push daily  sodium chloride 1 Gram(s) Oral every 8 hours    MEDICATIONS  (PRN):  fentaNYL    Injectable 25 MICROGram(s) IV Push every 2 hours PRN pain  labetalol Injectable 10 milliGRAM(s) IV Push every 4 hours PRN SBP > 180

## 2020-11-08 LAB
ANION GAP SERPL CALC-SCNC: 9 MMOL/L — SIGNIFICANT CHANGE UP (ref 5–17)
BUN SERPL-MCNC: 18 MG/DL — SIGNIFICANT CHANGE UP (ref 7–23)
CALCIUM SERPL-MCNC: 8.6 MG/DL — SIGNIFICANT CHANGE UP (ref 8.4–10.5)
CHLORIDE SERPL-SCNC: 108 MMOL/L — SIGNIFICANT CHANGE UP (ref 96–108)
CO2 SERPL-SCNC: 24 MMOL/L — SIGNIFICANT CHANGE UP (ref 22–31)
CREAT SERPL-MCNC: 0.41 MG/DL — LOW (ref 0.5–1.3)
GAS PNL BLDA: SIGNIFICANT CHANGE UP
GLUCOSE SERPL-MCNC: 163 MG/DL — HIGH (ref 70–99)
HCT VFR BLD CALC: 31.7 % — LOW (ref 34.5–45)
HCT VFR BLD CALC: 32 % — LOW (ref 34.5–45)
HGB BLD-MCNC: 10 G/DL — LOW (ref 11.5–15.5)
HGB BLD-MCNC: 10 G/DL — LOW (ref 11.5–15.5)
MAGNESIUM SERPL-MCNC: 2.4 MG/DL — SIGNIFICANT CHANGE UP (ref 1.6–2.6)
MCHC RBC-ENTMCNC: 27.5 PG — SIGNIFICANT CHANGE UP (ref 27–34)
MCHC RBC-ENTMCNC: 27.6 PG — SIGNIFICANT CHANGE UP (ref 27–34)
MCHC RBC-ENTMCNC: 31.3 GM/DL — LOW (ref 32–36)
MCHC RBC-ENTMCNC: 31.5 GM/DL — LOW (ref 32–36)
MCV RBC AUTO: 87.6 FL — SIGNIFICANT CHANGE UP (ref 80–100)
MCV RBC AUTO: 87.9 FL — SIGNIFICANT CHANGE UP (ref 80–100)
NRBC # BLD: 0 /100 WBCS — SIGNIFICANT CHANGE UP (ref 0–0)
NRBC # BLD: 0 /100 WBCS — SIGNIFICANT CHANGE UP (ref 0–0)
PHOSPHATE SERPL-MCNC: 1.8 MG/DL — LOW (ref 2.5–4.5)
PLATELET # BLD AUTO: 478 K/UL — HIGH (ref 150–400)
PLATELET # BLD AUTO: SIGNIFICANT CHANGE UP K/UL (ref 150–400)
POTASSIUM SERPL-MCNC: 4.3 MMOL/L — SIGNIFICANT CHANGE UP (ref 3.5–5.3)
POTASSIUM SERPL-SCNC: 4.3 MMOL/L — SIGNIFICANT CHANGE UP (ref 3.5–5.3)
RBC # BLD: 3.62 M/UL — LOW (ref 3.8–5.2)
RBC # BLD: 3.64 M/UL — LOW (ref 3.8–5.2)
RBC # FLD: 14.9 % — HIGH (ref 10.3–14.5)
RBC # FLD: 14.9 % — HIGH (ref 10.3–14.5)
SODIUM SERPL-SCNC: 141 MMOL/L — SIGNIFICANT CHANGE UP (ref 135–145)
WBC # BLD: 12.76 K/UL — HIGH (ref 3.8–10.5)
WBC # BLD: 13.64 K/UL — HIGH (ref 3.8–10.5)
WBC # FLD AUTO: 12.76 K/UL — HIGH (ref 3.8–10.5)
WBC # FLD AUTO: 13.64 K/UL — HIGH (ref 3.8–10.5)

## 2020-11-08 PROCEDURE — 71045 X-RAY EXAM CHEST 1 VIEW: CPT | Mod: 26

## 2020-11-08 PROCEDURE — 99232 SBSQ HOSP IP/OBS MODERATE 35: CPT

## 2020-11-08 PROCEDURE — 95720 EEG PHY/QHP EA INCR W/VEEG: CPT

## 2020-11-08 RX ORDER — AMLODIPINE BESYLATE 2.5 MG/1
10 TABLET ORAL DAILY
Refills: 0 | Status: DISCONTINUED | OUTPATIENT
Start: 2020-11-08 | End: 2020-11-09

## 2020-11-08 RX ORDER — HYDRALAZINE HCL 50 MG
10 TABLET ORAL ONCE
Refills: 0 | Status: DISCONTINUED | OUTPATIENT
Start: 2020-11-08 | End: 2020-11-08

## 2020-11-08 RX ORDER — HYDRALAZINE HCL 50 MG
10 TABLET ORAL ONCE
Refills: 0 | Status: COMPLETED | OUTPATIENT
Start: 2020-11-08 | End: 2020-11-08

## 2020-11-08 RX ORDER — LEVETIRACETAM 250 MG/1
500 TABLET, FILM COATED ORAL EVERY 12 HOURS
Refills: 0 | Status: DISCONTINUED | OUTPATIENT
Start: 2020-11-08 | End: 2020-11-09

## 2020-11-08 RX ORDER — METOPROLOL TARTRATE 50 MG
25 TABLET ORAL ONCE
Refills: 0 | Status: COMPLETED | OUTPATIENT
Start: 2020-11-08 | End: 2020-11-08

## 2020-11-08 RX ORDER — ATORVASTATIN CALCIUM 80 MG/1
40 TABLET, FILM COATED ORAL AT BEDTIME
Refills: 0 | Status: DISCONTINUED | OUTPATIENT
Start: 2020-11-08 | End: 2020-11-18

## 2020-11-08 RX ORDER — METOPROLOL TARTRATE 50 MG
50 TABLET ORAL EVERY 12 HOURS
Refills: 0 | Status: DISCONTINUED | OUTPATIENT
Start: 2020-11-08 | End: 2020-11-09

## 2020-11-08 RX ORDER — LABETALOL HCL 100 MG
10 TABLET ORAL ONCE
Refills: 0 | Status: COMPLETED | OUTPATIENT
Start: 2020-11-08 | End: 2020-11-08

## 2020-11-08 RX ADMIN — PANTOPRAZOLE SODIUM 40 MILLIGRAM(S): 20 TABLET, DELAYED RELEASE ORAL at 11:58

## 2020-11-08 RX ADMIN — Medication 650 MILLIGRAM(S): at 12:27

## 2020-11-08 RX ADMIN — LEVETIRACETAM 400 MILLIGRAM(S): 250 TABLET, FILM COATED ORAL at 05:24

## 2020-11-08 RX ADMIN — CHLORHEXIDINE GLUCONATE 15 MILLILITER(S): 213 SOLUTION TOPICAL at 17:36

## 2020-11-08 RX ADMIN — SODIUM CHLORIDE 1 GRAM(S): 9 INJECTION INTRAMUSCULAR; INTRAVENOUS; SUBCUTANEOUS at 13:51

## 2020-11-08 RX ADMIN — LIDOCAINE 1 PATCH: 4 CREAM TOPICAL at 05:58

## 2020-11-08 RX ADMIN — Medication 650 MILLIGRAM(S): at 17:35

## 2020-11-08 RX ADMIN — Medication 650 MILLIGRAM(S): at 05:23

## 2020-11-08 RX ADMIN — Medication 10 MILLIGRAM(S): at 02:00

## 2020-11-08 RX ADMIN — Medication 650 MILLIGRAM(S): at 11:57

## 2020-11-08 RX ADMIN — ENOXAPARIN SODIUM 40 MILLIGRAM(S): 100 INJECTION SUBCUTANEOUS at 11:58

## 2020-11-08 RX ADMIN — FENTANYL CITRATE 25 MICROGRAM(S): 50 INJECTION INTRAVENOUS at 08:14

## 2020-11-08 RX ADMIN — SODIUM CHLORIDE 1 GRAM(S): 9 INJECTION INTRAMUSCULAR; INTRAVENOUS; SUBCUTANEOUS at 21:22

## 2020-11-08 RX ADMIN — NYSTATIN CREAM 1 APPLICATION(S): 100000 CREAM TOPICAL at 17:37

## 2020-11-08 RX ADMIN — FENTANYL CITRATE 25 MICROGRAM(S): 50 INJECTION INTRAVENOUS at 05:42

## 2020-11-08 RX ADMIN — CHLORHEXIDINE GLUCONATE 15 MILLILITER(S): 213 SOLUTION TOPICAL at 06:42

## 2020-11-08 RX ADMIN — SODIUM CHLORIDE 1 GRAM(S): 9 INJECTION INTRAMUSCULAR; INTRAVENOUS; SUBCUTANEOUS at 05:32

## 2020-11-08 RX ADMIN — Medication 10 MILLIGRAM(S): at 10:40

## 2020-11-08 RX ADMIN — ATORVASTATIN CALCIUM 40 MILLIGRAM(S): 80 TABLET, FILM COATED ORAL at 21:22

## 2020-11-08 RX ADMIN — FENTANYL CITRATE 25 MICROGRAM(S): 50 INJECTION INTRAVENOUS at 08:29

## 2020-11-08 RX ADMIN — Medication 650 MILLIGRAM(S): at 23:10

## 2020-11-08 RX ADMIN — Medication 62.5 MILLIMOLE(S): at 06:41

## 2020-11-08 RX ADMIN — Medication 250 MILLIMOLE(S): at 11:58

## 2020-11-08 RX ADMIN — Medication 25 MILLIGRAM(S): at 05:31

## 2020-11-08 RX ADMIN — FENTANYL CITRATE 25 MICROGRAM(S): 50 INJECTION INTRAVENOUS at 00:39

## 2020-11-08 RX ADMIN — Medication 25 MILLIGRAM(S): at 09:23

## 2020-11-08 RX ADMIN — AMLODIPINE BESYLATE 10 MILLIGRAM(S): 2.5 TABLET ORAL at 13:51

## 2020-11-08 RX ADMIN — CHLORHEXIDINE GLUCONATE 1 APPLICATION(S): 213 SOLUTION TOPICAL at 05:24

## 2020-11-08 RX ADMIN — LIDOCAINE 1 PATCH: 4 CREAM TOPICAL at 19:37

## 2020-11-08 RX ADMIN — NYSTATIN CREAM 1 APPLICATION(S): 100000 CREAM TOPICAL at 05:23

## 2020-11-08 RX ADMIN — CEFTRIAXONE 100 MILLIGRAM(S): 500 INJECTION, POWDER, FOR SOLUTION INTRAMUSCULAR; INTRAVENOUS at 10:17

## 2020-11-08 RX ADMIN — Medication 10 MILLIGRAM(S): at 12:05

## 2020-11-08 RX ADMIN — Medication 650 MILLIGRAM(S): at 00:39

## 2020-11-08 RX ADMIN — Medication 650 MILLIGRAM(S): at 18:05

## 2020-11-08 RX ADMIN — LEVETIRACETAM 400 MILLIGRAM(S): 250 TABLET, FILM COATED ORAL at 17:36

## 2020-11-08 RX ADMIN — Medication 62.5 MILLIMOLE(S): at 09:15

## 2020-11-08 NOTE — PROGRESS NOTE ADULT - ATTENDING COMMENTS
S/p fall at home with largr lt SDH with shift, small lt SDH  F/U CT stable lt SDH with decreasing midline shift, with new rt cerebellar infarct    No significant neurologic recovery so far, h/o dementia and hydrocephalus s/p ligation of  shunt  Continue Keppra for seizure prophylaxis, no active seizure on EEG  BP goal -180 , increase  Lopressor dose, prn Labetalol  Vent adjustment for resp alkalosis, on PS  Tube feed at goal  On salt tab to keep serum Na > 140  On chemical DVT ppx,  surveillance duplex of LE neg for DVT  Pan sensitive E coli UTI, on 5 day  CTX course  Monitor glycemia.  MSK L 8-10 rib fractures. Recent L hip fx, non operative management  Pt remains DNR

## 2020-11-08 NOTE — PROGRESS NOTE ADULT - SUBJECTIVE AND OBJECTIVE BOX
Events reported over the past 24hrs:  - Patient noted to be alkalotic on ABG and have high TV and RR, changed to CPAP and RR and TV decreased- patient tolerated well most of the day   - Started EEG and received TTE per neuro request- no seizure activity or cardiac thrombi  - Neuro exam unchanged throughout day     Patient seen and examined at bedside.  Intubated sedated on mechanical ventilation  Neuro status not improving.    Physical Exam  PHYSICAL EXAM:  General: intubated, appears to be in NAD  Chest: Intubated on mechanical ventilation.   Neuro:  not following commands, not withdrawing to pain.  Abdomen: soft, nontender, nondistended       Vital Signs Last 24 Hrs  T(C): 36.8 (08 Nov 2020 11:00), Max: 37.5 (07 Nov 2020 15:00)  T(F): 98.2 (08 Nov 2020 11:00), Max: 99.5 (07 Nov 2020 15:00)  HR: 96 (08 Nov 2020 13:00) (70 - 104)  BP: 193/89 (08 Nov 2020 13:00) (147/69 - 215/112)  BP(mean): 130 (08 Nov 2020 13:00) (99 - 148)  RR: 26 (08 Nov 2020 13:00) (19 - 30)  SpO2: 97% (08 Nov 2020 13:00) (97% - 100%)    I&O's Detail    07 Nov 2020 07:01  -  08 Nov 2020 07:00  --------------------------------------------------------  IN:    IV PiggyBack: 50 mL    IV PiggyBack: 100 mL    Pivot 1.5: 1265 mL  Total IN: 1415 mL    OUT:    Indwelling Catheter - Urethral (mL): 1200 mL  Total OUT: 1200 mL    Total NET: 215 mL      08 Nov 2020 07:01  -  08 Nov 2020 14:14  --------------------------------------------------------  IN:    IV PiggyBack: 250 mL  Total IN: 250 mL    OUT:    Indwelling Catheter - Urethral (mL): 920 mL  Total OUT: 920 mL    Total NET: -670 mL        11-08    141  |  108  |  18  ----------------------------<  163<H>  4.3   |  24  |  0.41<L>    Ca    8.6      08 Nov 2020 04:56  Phos  1.8     11-08  Mg     2.4     11-08                              10.0   13.64 )-----------( 478      ( 08 Nov 2020 05:51 )             32.0       PT/INR - ( 07 Nov 2020 00:26 )   PT: 14.0 sec;   INR: 1.18 ratio         PTT - ( 07 Nov 2020 00:26 )  PTT:28.0 sec    LABS:                         10.0   13.64 )-----------( 478      ( 08 Nov 2020 05:51 )             32.0     11-08    141  |  108  |  18  ----------------------------<  163<H>  4.3   |  24  |  0.41<L>    Ca    8.6      08 Nov 2020 04:56  Phos  1.8     11-08  Mg     2.4     11-08      PT/INR - ( 07 Nov 2020 00:26 )   PT: 14.0 sec;   INR: 1.18 ratio         PTT - ( 07 Nov 2020 00:26 )  PTT:28.0 sec          RADIOLOGY, EKG & ADDITIONAL TESTS: Reviewed.     MEDICATIONS  (STANDING):  acetaminophen   Tablet .. 650 milliGRAM(s) Oral every 6 hours  amLODIPine   Tablet 10 milliGRAM(s) Oral daily  atorvastatin 40 milliGRAM(s) Oral at bedtime  chlorhexidine 0.12% Liquid 15 milliLiter(s) Oral Mucosa every 12 hours  chlorhexidine 2% Cloths 1 Application(s) Topical <User Schedule>  enoxaparin Injectable 40 milliGRAM(s) SubCutaneous daily  hydrALAZINE Injectable 10 milliGRAM(s) IV Push once  levETIRAcetam  IVPB 500 milliGRAM(s) IV Intermittent every 12 hours  lidocaine   Patch 1 Patch Transdermal every 24 hours  metoprolol tartrate 50 milliGRAM(s) Oral every 12 hours  nystatin Powder 1 Application(s) Topical two times a day  pantoprazole  Injectable 40 milliGRAM(s) IV Push daily  sodium chloride 1 Gram(s) Oral every 8 hours    MEDICATIONS  (PRN):  fentaNYL    Injectable 25 MICROGram(s) IV Push every 2 hours PRN pain  labetalol Injectable 10 milliGRAM(s) IV Push every 4 hours PRN SBP > 180

## 2020-11-08 NOTE — PROGRESS NOTE ADULT - ASSESSMENT
80F hx of dementia, hydrocephalus s/p  shunt and recent hip fracture non-operative management at SNF presents from SNF after a mechanical fall, level I trauma activated, primary survey significant for GCS 8, decision was made to intubate in trauma bay. Secondary survey significant for L forehead hematoma, R lower abdomen abrasion. CT scan significant for large L SDH with midline shift and L 8-10 rib fx. NSGY and family discussed, plan for non-operative management at this time. CT head 4/11 revealed decreased subdural hematoma and midline shift 11mm-> 7 mm. New low density cerebellar focus suspicious for infarcts.   As per neurosurgery note "GOC discussion pending, likely needs trach/PEG"    Neuro:  - Unchanged neuro exam; stable head bleeds, decreased midline shift and cerebellar infarction  - Plan for possible hazel hole (HD10) if patients neuro status continues to improve- pending GOC    - Keppra 500 BID  - Off all sedation; fentanyl pushes for pain and Ativan for anxiety     Resp: L 8-10 rib fx  - Intubated, CPAP 0/5  - Trach planning pending GOC discussions     CV: Goal SBP <200  - Home atorvastatin  - Metoprolol 25mg Q12  - Labetalol Q4 PRN    GI:  - Tube feeds started 11/6 pivot @ 55  - Liver mass (+ renal mass) finding discussed with son   - Discussion of PEG pending GOC     :   - Carrasco for hemodynamic monitoring     Heme:  - Lovenox VTE ppx started 11/6  - BLE duplex negative for DVT 11/6    ID: UTI  - CTX for UTI for total of 5 days (ends 11/9)     Endo:  - Monitor glucose on BMP    Dispo:  - SICU  - DNR

## 2020-11-08 NOTE — EEG REPORT - NS EEG TEXT BOX
BRITTANY MENDEZ [id number]     Study Date: 		11-08-20    ROUTINE EEG    Technical Information:			  		  Placement and Labeling of Electrodes:  The EEG was performed utilizing 20 channels referential EEG connections (coronal over temporal over parasagittal montage) using all standard 10-20 electrode placements with EKG.  Recording was at a sampling rate of 256 samples per second per channel.  Time synchronized digital video recording was done simultaneously with EEG recording.  A low light infrared camera was used for low light recording.  Daljit and seizure detection algorithms were utilized.    CSA Technical Component:  Quantitative EEG analysis using a separate Compressed Spectral Array (CSA) software package was conducted in real-time and run at bedside after set up by the technician, digitally displaying the power of electrographic frequencies included in the 1-30Hz band using a graded color map.  This data was reviewed and interpreted independently, and is reported in a separate section below.    --------------------------------------------------------------------------------------------------  History:  CC/ HPI Patient is a 80y old  Female who presents with a chief complaint of s/p fall (08 Nov 2020 09:06)    MEDICATIONS  (STANDING):  acetaminophen   Tablet .. 650 milliGRAM(s) Oral every 6 hours  atorvastatin 80 milliGRAM(s) Oral at bedtime  cefTRIAXone   IVPB      cefTRIAXone   IVPB 1000 milliGRAM(s) IV Intermittent every 24 hours  chlorhexidine 0.12% Liquid 15 milliLiter(s) Oral Mucosa every 12 hours  chlorhexidine 2% Cloths 1 Application(s) Topical <User Schedule>  enoxaparin Injectable 40 milliGRAM(s) SubCutaneous daily  levETIRAcetam  IVPB 500 milliGRAM(s) IV Intermittent every 12 hours  lidocaine   Patch 1 Patch Transdermal every 24 hours  metoprolol tartrate 50 milliGRAM(s) Oral every 12 hours  metoprolol tartrate 25 milliGRAM(s) Oral once  nystatin Powder 1 Application(s) Topical two times a day  pantoprazole  Injectable 40 milliGRAM(s) IV Push daily  sodium chloride 1 Gram(s) Oral every 8 hours  sodium phosphate IVPB 15 milliMole(s) IV Intermittent every 1 hour    --------------------------------------------------------------------------------------------------  Study Interpretation:    [[[Abbreviation Key:  PDR=alpha rhythm/posterior dominant rhythm. A-P=anterior posterior gradient.  Amplitude: ‘very low’:<20; ‘low’:20-50; ‘medium’:; ‘high’:>200uV.  Persistence for periodic/rhythmic patterns (% of epoch) ‘rare’:<1%; ‘occasional’:1-10%; ‘frequent’:10-50%; ‘abundant’:50-90%; ‘continuous’:>90%.  Persistence for sporadic discharges: ‘rare’:<1/hr; ‘occasional’:1/min-1/hr; ‘frequent’:>1/min; ‘abundant’:>1/10 sec.  GRDA=generalized rhythmic delta activity, LRDA=lateralized rhythmic delta activity, TIRDA=temporal intermittent rhythmic delta activity, FIRDA=frontal intermittent rhythmic activity. LPD=PLED=lateralized periodic discharges, GPD=generalized periodic discharges, BiPDs=BiPLEDs=bilateral independent periodic epileptiform discharges, SIRPID=stimulus induced rhythmic, periodic, or ictal appearing discharges.  Modifiers: +F=with fast component, +S=with spike component, +R=with rhythmic component.  S-B=burst suppression pattern.  Max=maximal. N1-drowsy, N2-stage II sleep, N3-slow wave sleep.  HV=hyperventilation, PS=photic stimulation]]]    FINDINGS:  The background was continuous, spontaneously variable and reactive.  During wakefulness, the posteriorly dominant rhythm was poorly modulated near 7Hz over the right, attenuated over the left.     There was diffuse irregular theta and delta activity present, sharply contoured at times, amplitudes persistently lower over the left with relative attenuation.    Sleep Background:  Drowsiness was characterized by fragmentation, attenuation, and slowing of the background activity.    Sleep was characterized by the presence of rudimentary symmetric spindles, and K-complexes.    Epileptiform Activity:   Frequent runs of 1hz Left central max periodic epileptiform discharges were present.    Events:  No clinical events were recorded.  No seizures were recorded.    Activation Procedures:   -Hyperventilation was not performed.    -Photic stimulation was not performed.    Artifacts:  Intermittent myogenic and movement artifacts were noted.    ECG:  The heart rate on single channel ECG at baseline was predominantly near BPM = 70-90  -----------------------------------------------------------------------------------------------------    EEG Classification / Summary:  Abnormal EEG study  Moderate background slowing, amplitudes reduced over the left.  Frequent runs of 1hz Left central max periodic epileptiform discharges were present.    -----------------------------------------------------------------------------------------------------    Clinical Impression:  High risk of focal onset seizures from the left paracentral region. Seizure prophylaxis recommended.  Moderate multifocal cerebral dysfunction, with fluid attenuation effect on the left.     -------------------------------------------------------------------------------------------------------  [facility] EEG Reading Room Ph#: [phone]  Epilepsy Answering Service after 5PM and before 8:30AM: Ph#: [phone]    Phil Trejo M.D.   of Neurology, [facility] Epilepsy [place]

## 2020-11-08 NOTE — PROGRESS NOTE ADULT - SUBJECTIVE AND OBJECTIVE BOX
SUBJECTIVE: patient still intubated, on c-collar     INTERVAL HISTORY: current  systolic.    PAST MEDICAL & SURGICAL HISTORY:  H/O hydrocephalus  Dementia  S/P ventriculoperitoneal shunt    FAMILY HISTORY:    MEDICATIONS (HOME):  Home Medications:  Tylenol 325 mg oral tablet: 2 tab(s) orally every 6 hours, As Needed (04 Nov 2020 01:50)    MEDICATIONS  (STANDING):  acetaminophen   Tablet .. 650 milliGRAM(s) Oral every 6 hours  atorvastatin 80 milliGRAM(s) Oral at bedtime  chlorhexidine 0.12% Liquid 15 milliLiter(s) Oral Mucosa every 12 hours  chlorhexidine 2% Cloths 1 Application(s) Topical <User Schedule>  enoxaparin Injectable 40 milliGRAM(s) SubCutaneous daily  levETIRAcetam  IVPB 500 milliGRAM(s) IV Intermittent every 12 hours  lidocaine   Patch 1 Patch Transdermal every 24 hours  metoprolol tartrate 50 milliGRAM(s) Oral every 12 hours  nystatin Powder 1 Application(s) Topical two times a day  pantoprazole  Injectable 40 milliGRAM(s) IV Push daily  sodium chloride 1 Gram(s) Oral every 8 hours  sodium phosphate IVPB 15 milliMole(s) IV Intermittent once    MEDICATIONS  (PRN):  fentaNYL    Injectable 25 MICROGram(s) IV Push every 2 hours PRN pain  labetalol Injectable 10 milliGRAM(s) IV Push every 4 hours PRN SBP > 180    ALLERGIES/INTOLERANCES:  Allergies  penicillin (Unknown)    Intolerances    VITALS & EXAMINATION:  Vital Signs Last 24 Hrs  T(C): 37.5 (07 Nov 2020 17:00), Max: 37.5 (07 Nov 2020 15:00)  T(F): 99.5 (07 Nov 2020 17:00), Max: 99.5 (07 Nov 2020 15:00)  HR: 88 (08 Nov 2020 10:00) (70 - 104)  BP: 192/91 (08 Nov 2020 10:00) (147/69 - 209/97)  BP(mean): 130 (08 Nov 2020 10:00) (99 - 139)  RR: 21 (08 Nov 2020 10:00) (19 - 30)  SpO2: 99% (08 Nov 2020 10:00) (97% - 100%)    General Exam:   Constitutional: Appears stated age, intubated.   Neurological (>12):  MS: Intubated, not following commands, no response to vocal stimulation. Grimaces to sternal rub.   Language: Intubated.   CNs: (R = 2mm, L = 2mm) Poorly reactive, right eye exotropia.  No noted blink to threat. Corneal reflex intact. No facial asymmetry b/l.   Motor: Increased tone in the LLE in extension. no tremors at this time.  Sensory: Withdrawal to noxious stimuli in all 4 extremities LLE>RLE. Reduced withdrawal in the LLE.   Coordination: unable to test  Gait: Deferred     LABORATORY:  CBC                       10.0   13.64 )-----------( 478      ( 08 Nov 2020 05:51 )             32.0     Chem 11-08    141  |  108  |  18  ----------------------------<  163<H>  4.3   |  24  |  0.41<L>    Ca    8.6      08 Nov 2020 04:56  Phos  1.8     11-08  Mg     2.4     11-08      LFTs   Coagulopathy PT/INR - ( 07 Nov 2020 00:26 )   PT: 14.0 sec;   INR: 1.18 ratio         PTT - ( 07 Nov 2020 00:26 )  PTT:28.0 sec  Lipid Panel 11-05 Chol 147 LDL -- HDL 40<L> Trig 115  A1c   Cardiac enzymes     U/A   CSF  Immunological  Other    STUDIES & IMAGING:  Studies (EKG, EEG, EMG, etc):     < from: TTE with Doppler (w/Cont) (11.07.20 @ 10:30) >  OBSERVATIONS:  Mitral Valve: Mild mitral annular calcification. Mild  mitral regurgitation.  Aortic Root: The aortic root was not well seen.  Aortic Valve: The aortic valve is not visualized in short  axis imaging for anatomic evaluation.  There is no aortic stenosis. Peak transaortic valve  gradient equals 10 mm Hg, mean transaortic valve gradient  equals 5 mm Hg, aortic valve velocity time integral equals  30 cm. Mild aortic regurgitation.  Peak left ventricular  outflow tract gradient equals 3 mm Hg, mean gradient is  equal to 1 mm Hg, LVOT velocity time integral equals 17 cm.  Left Atrium: Mildly dilated left atrium.  LA volume index =  41 cc/m2.  Left Ventricle: Normal left ventricular systolic function.  No segmental wall motion abnormalities.  The LVEF= 55-60%.  Normal left ventricular size. Diastolic dysfunction with  elevated left atrial pressures.  Right Heart: Normal right atrial size.  The right atrial area= 14cm2, the right atrial volume=  35ml. Normal right ventricular size and systolic function.  Normal tricuspid valve. Minimal tricuspid regurgitation.  Pulmonic valve not well visualized.  Pericardium/PleuraThere is no pericardial effusion.  Hemodynamic: The estimated right atrial pressure is normal.  There is no shunt at the atrial level with agitated saline.  ------------------------------------------------------------------------  CONCLUSIONS:  1. There is no shunt at the atrial level with agitated  saline.  2. Diastolic dysfunction with elevated left atrial  pressures.  *** Limited echo windows, patient is intubated and supine.   Poor parasternal views.  No previous Echo exam.  ------------------------------------------------------------------------  PROCEDURE DESCRIPTION: Transthoracic echocardiogram with  2-D, M-Mode and complete spectral and color flow Doppler.  Patient was injected with 10 cc's of aerosolized saline.  Patient was injected with 10 cc's of aerosolized saline.  Verbal consent was obtained for injection of  Ultrasonic  Enhancing Agent following a discussion of risks and  benefits. Following intravenous injection of Ultrasonic  Enhancing Agent , harmonic imaging was performed.  ------------------------------------------------------------------------  ECHOCARDIOGRAPHIC EXAMINATION:  LEFT ATRIUM:  LA Volume Index: 41.00 cc/sqm  LA Volume: 74.6 cc  HR and BP:  HR: 90 bpm  BP: 191/86  BSA: 1.80  ------------------------------------------------------------------------  HEMODYNAMICS:  RA Pressure Estimate: 8  ------------------------------------------------------------------------  COLOR FLOW and SPECIAL DOPPLER:  LVOT:  LVOT Velocity: .8 M/sec  LVOT Peak Gradient Rest: 2 mm Hg  LVOT Mean Gradient Rest: 1mm Hg  ------------------------------------------------------------------------  DIASTOLIC FUNCTION:  DT:470 ms  E/A: 0.71  e' Septal: 5.0 cm/s  E/e' Septal: 14.2  e' Lateral: 4.0 cm/s  E/e' Lateral: 17.7  MV E wave: 0.7 m/s  MV A wave: 1.0 m/s  Septal a': 12.0 cm/s  Lateral a': 15.0 cm/s    < end of copied text >        EEG Classification / Summary:  Abnormal EEG study  Moderate background slowing, amplitudes reduced over the left.  Frequent runs of 1hz Left central max periodic epileptiform discharges were present.    -----------------------------------------------------------------------------------------------------    Clinical Impression:  High risk of focal onset seizures from the left paracentral region. Seizure prophylaxis recommended.  Moderate multifocal cerebral dysfunction, with fluid attenuation effect on the left.   Radiology (XR, CT, MR, U/S, TTE/JUNAID):    < from: CT Head No Cont (11.04.20 @ 13:13) >  FINDINGS:    Redemonstration of right parietal approach ventriculostomy catheter in unchanged position.    Acute left lateral convexity subdural hemorrhage measures 1.7 cm in greatest depth, decreased from 1.9 cm.    Acute right lateral convexity subdural hemorrhage measures 6 mm in greatest depth, similar to the prior examination.    Acute subdural hemorrhage in the interhemispheric fissure measures 9 mm in greatest thickness, previously measuring 11 mm    Acute subdural hemorrhage along the tentorial leaves is similar, measuring4 to 5 mm in greatest thickness on the left.    Rightward midline shift of 7 mm is decreased from 11 mm. Increased ventricular size, no large hydrocephalus. Basal cisterns are more well visualized on the current examination.    Similar nonspecific 6 mm focus of increased attenuation in the left corona radiata (2-22).    New foci of low density in the right inferior cerebellar hemisphere, suspicious for acute infarction. No CT evidence for hemorrhagic transformation.    Similar extensive white matter microvascular ischemic disease.    No displaced calvarial fracture. The visualized paranasal sinuses and mastoid air cells are clear.    IMPRESSION:  New foci of low density in the right inferior cerebellar hemisphere, suspicious for acute infarction. No CT evidence for hemorrhagic transformation.    Decreased size of left lateral convexity subdural hemorrhage. Similar size of right lateral convexity subdural hemorrhage.    Decreased size of interhemispheric subdural hemorrhage. Similar tentorial subdural hemorrhage.    Decreased rightward midline shift, currently 7 mm, previously 11 mm.    Increased ventricular size, no large hydrocephalus. Basal cisterns are more well visualized on the current examination.    < end of copied text >    < from: CT Head No Cont (11.03.20 @ 23:29) >  Bilateral supratentorial subdural hematomas are again identified. The subdural hematoma on the left side measures approximately 2.2 cm in widest diameter and on the right side measures approximately 0.5 cm in widest diameter. Acute subdural hematoma along the interhemispheric region is seen along the left side and measures approximately 0.9 cm widest diameter. Acute subdural hematomas along bilateral tentorial region are again seen as well. There is mass effect on left lateral ventricle. Left-to-right shift (1.1 cm) is again seen.    Right parietal shunt catheter is again seen. This catheter appears unchanged in position.    Evaluation of the osseous structures with the appropriate window appears normal    The visualized paranasal sinuses mastoid and middle ear regions appear clear.    IMPRESSION: No stiffing change when allowing for differences in technique.    < end of copied text >

## 2020-11-08 NOTE — PROGRESS NOTE ADULT - ATTENDING COMMENTS
seen and examined 11-08-20 @ 0856    intubated on mechanical vent (spont 0 / +5 / 30%)  on Pivot 1.5 @ 55 mL/hr  off sedation  c-collar in place  PERRL  opens eyes to pain  localizes with LUE  withdraws RUE  5 mm left forehead wound without evidence of infection    left holohemispheric / parafalcine / tentorial SDH  right  shunt ligated by neurosurgery on 11/3  -stable on 11/4 1305 repeat CT head  -serial neuro exams  -started chemical VTE prophylaxis on 11/6  -very poor prognosis, so f/u palliative care consult

## 2020-11-08 NOTE — PROGRESS NOTE ADULT - ASSESSMENT
79 y/o  woman with dementia, hydrocephalus s/p VPS admitted after fall at nursing facility with LOC and unresponsiveness.  Initial CTH with acute SDH L>R and on interhemispheric ffissure and tentorial leaves with rightward shift 11mm decreased to 7mm. Repeat CTH with new R inferior cerebellar hypodensity concerning for infarct. Neuro exam without appreciable interval change: intubated, C-collar, no verbal output, not following commands, movement of extremities L>R w/ noxious stimuli.    EEG concerning for left hemispheric seizure focus.     Recommendations:  [x] Hold antiplatelets given SDH; consider when cleared from nsx standpoint.  [x] Lipitor 80mg can be reduced to 40mg  [x] c/w Keppra 500mg BID indefinitely for now given EEG results   [x] LDL 84, HbA1C 5.6  [x] Telemetry monitoring  [x] SBP goal normotensive, less than 160/90.   [x]VEEG reviewed   [x]TTE reviewed  [ ] MRI/MRA when able if in agreement with GOC of family  [x] LE dopplers: no DVT   [ ] GOC discussion pending, likely needs trach/PEG

## 2020-11-08 NOTE — PROGRESS NOTE ADULT - SUBJECTIVE AND OBJECTIVE BOX
SICU Daily Progress Note  =====================================================  Interval/Overnight Events:  - Patient noted to be alkalotic on ABG and have high TV and RR, changed to CPAP and RR and TV decreased- patient tolerated well most of the day   - Started EEG and received TTE per neuro request- no seizure activity or cardiac thrombi  - Neuro exam unchanged throughout day     HPI: 80F hx of dementia, hydrocephalus s/p  shunt and recent hip fracture non-operative management at [facility] presents from [facility] after a mechanical fall, level I trauma activated, primary survey significant for GCS 8, decision was made to intubate in trauma bay. Secondary survey significant for L forehead hematoma, R lower abdomen abrasion. CT scan significant for large L SDH with midline shift and L 8-10 rib fx. NSGY and family discussed, plan for non-operative management at this time. Patient was transferred to SICU for vent management and hemodynamic monitoring.    NSGY ligated  shunt at bedside. Repeat CT head was stable. Patient was briefly started on a cardene gtt for hypertensive urgency, goal SBP < 180. UA+ started on meropenem. Plan per NSGY to continue conservative management with potential hazel hole in future. KO tube feeds started on 11/4. Metoprolol 25Q12 started, Labetalol Q4PRN for HTN. Due to pcn allergy meropenum given for UTI than switched to Cefepime then to Ceftriaxone.        MEDICATIONS:   --------------------------------------------------------------------------------------  Neurologic Medications  acetaminophen   Tablet .. 650 milliGRAM(s) Oral every 6 hours  fentaNYL    Injectable 25 MICROGram(s) IV Push every 2 hours PRN pain  levETIRAcetam  IVPB 500 milliGRAM(s) IV Intermittent every 12 hours    Respiratory Medications    Cardiovascular Medications  labetalol Injectable 10 milliGRAM(s) IV Push every 4 hours PRN SBP > 180  metoprolol tartrate 25 milliGRAM(s) Oral every 12 hours    Gastrointestinal Medications  pantoprazole  Injectable 40 milliGRAM(s) IV Push daily  sodium chloride 1 Gram(s) Oral every 8 hours    Genitourinary Medications    Hematologic/Oncologic Medications  enoxaparin Injectable 40 milliGRAM(s) SubCutaneous daily    Antimicrobial/Immunologic Medications  cefTRIAXone   IVPB      cefTRIAXone   IVPB 1000 milliGRAM(s) IV Intermittent every 24 hours    Endocrine/Metabolic Medications  atorvastatin 80 milliGRAM(s) Oral at bedtime    Topical/Other Medications  chlorhexidine 0.12% Liquid 15 milliLiter(s) Oral Mucosa every 12 hours  chlorhexidine 2% Cloths 1 Application(s) Topical <User Schedule>  lidocaine   Patch 1 Patch Transdermal every 24 hours  nystatin Powder 1 Application(s) Topical two times a day    --------------------------------------------------------------------------------------    VITAL SIGNS, INS/OUTS (last 24 hours):  --------------------------------------------------------------------------------------  Vital Signs Last 24 Hrs  T(C): 37.5 (07 Nov 2020 17:00), Max: 37.5 (07 Nov 2020 15:00)  T(F): 99.5 (07 Nov 2020 17:00), Max: 99.5 (07 Nov 2020 15:00)  HR: 86 (08 Nov 2020 00:29) (70 - 100)  BP: 202/85 (07 Nov 2020 21:00) (136/65 - 229/109)  BP(mean): 122 (07 Nov 2020 21:00) (93 - 157)  RR: 20 (07 Nov 2020 19:00) (12 - 34)  SpO2: 100% (08 Nov 2020 00:29) (96% - 100%)  --------------------------------------------------------------------------------------    EXAM  NEUROLOGY   Exam: Minimally responsive to pain, some spontaneous movement of left arm, does not follow commandments     RESPIRATORY  Exam: Nonlabored, CTABL, no wheezes, ronchi, or rales. Normal respiratory effort.   Mechanical Ventilation: Mode: CPAP with PS, FiO2: 30, PEEP: 5, PS: 0, MAP: 6, PIP: 8    CARDIOVASCULAR  Exam: Normotensive, RRR, no M/R/G     GI/NUTRITION  Exam: Abdomen soft, NT/ND, no rebound or guarding.  Current Diet:  NPO    VASCULAR  Exam: Extremities warm, well-perfused. Adequate capillary refill.     HEMATOLOGIC  [x] VTE Prophylaxis: enoxaparin Injectable 40 milliGRAM(s) SubCutaneous daily        INFECTIOUS DISEASE  Antimicrobials/Immunologic Medications:  cefTRIAXone   IVPB      cefTRIAXone   IVPB 1000 milliGRAM(s) IV Intermittent every 24 hours    Day #      of     ***    Tubes/Lines/Drains  ***  [x] Peripheral IV  [] Central Venous Line     	[] R	[] L	[] IJ	[] Fem	[] SC	Date Placed:   [] Arterial Line		[] R	[] L	[] Fem	[] Rad	[] Ax	Date Placed:   [] PICC		[] Midline		[] Mediport  [] Urinary Catheter		  [x] Necessity of urinary, arterial, and venous catheters discussed    LABS  --------------------------------------------------------------------------------------                        9.5    12.42 )-----------( 405      ( 07 Nov 2020 00:26 )             30.8     11-07    137  |  107  |  13  ----------------------------<  171<H>  4.1   |  20<L>  |  0.52    Ca    8.6      07 Nov 2020 00:26  Phos  2.3     11-07  Mg     2.3     11-07      PT/INR - ( 07 Nov 2020 00:26 )   PT: 14.0 sec;   INR: 1.18 ratio         PTT - ( 07 Nov 2020 00:26 )  PTT:28.0 sec    --------------------------------------------------------------------------------------

## 2020-11-08 NOTE — PROGRESS NOTE ADULT - ASSESSMENT
80 year old female, with a medical history significant for dementia, not on A/C, BIBEMS unresponsive with head injury, reportedly fell, intubated in the ED to protect airway (GS=8), imaging studies showed left acute SDH, repeat CT after  shunt ligation shows stable SDH and left to right shift. CT head 4/11 revealed decreased subdural hematoma and midline shift 11mm-> 7 mm. New low density cerebellar focus suspicious for infarcts.       Plan:   - Pain control   - c/w intubation and mechanical ventilation  - Appreciate neurosurgery:  Conservative management.   - NPO   - Follow up with family meeting discussing GOC  - Appreciate SICU care    ACS  [id number]

## 2020-11-08 NOTE — PROGRESS NOTE ADULT - ASSESSMENT
80F PMHx NPH s/p Shunt approx 10 years ago, recent hip Fx, found down at rehab today, CT head in ED shows acute left, interhemispheric, small R SDH.  shunt ligated at clavicle bedside in SICU, repeat CT after ligation shows stable SDH followed by interval improvement in heme and mls.    ADM trauma/SICU  - EEG prelim - LPDs on L c/f elevated seizure risk  - no new imaging, stroke chapman and further mgmt pending C discussions  - wbc 13<12, plt 478  - still pending discussions re: trach/peg, still intubated, fu discussions per SICU  - on CTX for UTI, ends tomorrow  - LED neg 11/6  - ok for dvt ppx per Dr. Johnson  - shunt ligated at clavicle 11/3, site c/d/i  - DNR  - now off all sedation  - 12 hr repeat HCT 11/4 dec heme, new small R cerebellar infarct (embolic vs hypercoag)  - keppra 500 BID, rec inc to 750 BID  given prelim EEG reading  - known L rib fx  - CT also c/f metastatic RCC

## 2020-11-08 NOTE — PROGRESS NOTE ADULT - SUBJECTIVE AND OBJECTIVE BOX
Patient seen and examined at bedside.    --Anticoagulation--  enoxaparin Injectable 40 milliGRAM(s) SubCutaneous daily    T(C): 37.5 (11-07-20 @ 17:00), Max: 37.5 (11-07-20 @ 15:00)  HR: 98 (11-08-20 @ 08:07) (70 - 102)  BP: 209/97 (11-08-20 @ 08:00) (147/69 - 209/97)  RR: 24 (11-08-20 @ 08:00) (19 - 30)  SpO2: 100% (11-08-20 @ 08:07) (97% - 100%)  Wt(kg): --    Exam:  intubated, pupils 3R, LUE localizes, RUE w/d, RLE TF vs w/d, LLE w/d    no new imaging    Labs:                         10.0   13.64 )-----------( 478      ( 08 Nov 2020 05:51 )             32.0   11-08    141  |  108  |  18  ----------------------------<  163<H>  4.3   |  24  |  0.41<L>    Ca    8.6      08 Nov 2020 04:56  Phos  1.8     11-08  Mg     2.4     11-08    PT/INR - ( 07 Nov 2020 00:26 )   PT: 14.0 sec;   INR: 1.18 ratio         PTT - ( 07 Nov 2020 00:26 )  PTT:28.0 sec

## 2020-11-09 LAB
ANION GAP SERPL CALC-SCNC: 8 MMOL/L — SIGNIFICANT CHANGE UP (ref 5–17)
APTT BLD: 28.5 SEC — SIGNIFICANT CHANGE UP (ref 27.5–35.5)
BUN SERPL-MCNC: 18 MG/DL — SIGNIFICANT CHANGE UP (ref 7–23)
CALCIUM SERPL-MCNC: 8.2 MG/DL — LOW (ref 8.4–10.5)
CHLORIDE SERPL-SCNC: 104 MMOL/L — SIGNIFICANT CHANGE UP (ref 96–108)
CO2 SERPL-SCNC: 24 MMOL/L — SIGNIFICANT CHANGE UP (ref 22–31)
CREAT SERPL-MCNC: 0.42 MG/DL — LOW (ref 0.5–1.3)
CULTURE RESULTS: SIGNIFICANT CHANGE UP
CULTURE RESULTS: SIGNIFICANT CHANGE UP
GAS PNL BLDA: SIGNIFICANT CHANGE UP
GAS PNL BLDA: SIGNIFICANT CHANGE UP
GLUCOSE SERPL-MCNC: 149 MG/DL — HIGH (ref 70–99)
HCT VFR BLD CALC: 28.4 % — LOW (ref 34.5–45)
HGB BLD-MCNC: 9 G/DL — LOW (ref 11.5–15.5)
INR BLD: 1.23 RATIO — HIGH (ref 0.88–1.16)
MAGNESIUM SERPL-MCNC: 2.4 MG/DL — SIGNIFICANT CHANGE UP (ref 1.6–2.6)
MCHC RBC-ENTMCNC: 27.8 PG — SIGNIFICANT CHANGE UP (ref 27–34)
MCHC RBC-ENTMCNC: 31.7 GM/DL — LOW (ref 32–36)
MCV RBC AUTO: 87.7 FL — SIGNIFICANT CHANGE UP (ref 80–100)
NRBC # BLD: 0 /100 WBCS — SIGNIFICANT CHANGE UP (ref 0–0)
PHOSPHATE SERPL-MCNC: 2.2 MG/DL — LOW (ref 2.5–4.5)
PLATELET # BLD AUTO: 438 K/UL — HIGH (ref 150–400)
POTASSIUM SERPL-MCNC: 3.9 MMOL/L — SIGNIFICANT CHANGE UP (ref 3.5–5.3)
POTASSIUM SERPL-SCNC: 3.9 MMOL/L — SIGNIFICANT CHANGE UP (ref 3.5–5.3)
PROTHROM AB SERPL-ACNC: 14.6 SEC — HIGH (ref 10.6–13.6)
RBC # BLD: 3.24 M/UL — LOW (ref 3.8–5.2)
RBC # FLD: 14.8 % — HIGH (ref 10.3–14.5)
SODIUM SERPL-SCNC: 136 MMOL/L — SIGNIFICANT CHANGE UP (ref 135–145)
SPECIMEN SOURCE: SIGNIFICANT CHANGE UP
SPECIMEN SOURCE: SIGNIFICANT CHANGE UP
WBC # BLD: 11.38 K/UL — HIGH (ref 3.8–10.5)
WBC # FLD AUTO: 11.38 K/UL — HIGH (ref 3.8–10.5)

## 2020-11-09 PROCEDURE — 95720 EEG PHY/QHP EA INCR W/VEEG: CPT

## 2020-11-09 PROCEDURE — 71045 X-RAY EXAM CHEST 1 VIEW: CPT | Mod: 26

## 2020-11-09 PROCEDURE — 70450 CT HEAD/BRAIN W/O DYE: CPT | Mod: 26

## 2020-11-09 PROCEDURE — 99291 CRITICAL CARE FIRST HOUR: CPT

## 2020-11-09 RX ORDER — POTASSIUM PHOSPHATE, MONOBASIC POTASSIUM PHOSPHATE, DIBASIC 236; 224 MG/ML; MG/ML
15 INJECTION, SOLUTION INTRAVENOUS ONCE
Refills: 0 | Status: COMPLETED | OUTPATIENT
Start: 2020-11-09 | End: 2020-11-09

## 2020-11-09 RX ORDER — METOPROLOL TARTRATE 50 MG
50 TABLET ORAL EVERY 12 HOURS
Refills: 0 | Status: DISCONTINUED | OUTPATIENT
Start: 2020-11-09 | End: 2020-11-18

## 2020-11-09 RX ORDER — AMLODIPINE BESYLATE 2.5 MG/1
10 TABLET ORAL DAILY
Refills: 0 | Status: DISCONTINUED | OUTPATIENT
Start: 2020-11-09 | End: 2020-11-18

## 2020-11-09 RX ORDER — PANTOPRAZOLE SODIUM 20 MG/1
40 TABLET, DELAYED RELEASE ORAL DAILY
Refills: 0 | Status: DISCONTINUED | OUTPATIENT
Start: 2020-11-09 | End: 2020-11-09

## 2020-11-09 RX ORDER — SENNA PLUS 8.6 MG/1
5 TABLET ORAL AT BEDTIME
Refills: 0 | Status: DISCONTINUED | OUTPATIENT
Start: 2020-11-09 | End: 2020-11-10

## 2020-11-09 RX ORDER — POLYETHYLENE GLYCOL 3350 17 G/17G
17 POWDER, FOR SOLUTION ORAL
Refills: 0 | Status: DISCONTINUED | OUTPATIENT
Start: 2020-11-09 | End: 2020-11-10

## 2020-11-09 RX ORDER — CALCIUM GLUCONATE 100 MG/ML
1 VIAL (ML) INTRAVENOUS ONCE
Refills: 0 | Status: COMPLETED | OUTPATIENT
Start: 2020-11-09 | End: 2020-11-09

## 2020-11-09 RX ORDER — SENNA PLUS 8.6 MG/1
2 TABLET ORAL AT BEDTIME
Refills: 0 | Status: DISCONTINUED | OUTPATIENT
Start: 2020-11-09 | End: 2020-11-09

## 2020-11-09 RX ORDER — LEVETIRACETAM 250 MG/1
500 TABLET, FILM COATED ORAL EVERY 12 HOURS
Refills: 0 | Status: DISCONTINUED | OUTPATIENT
Start: 2020-11-09 | End: 2020-11-11

## 2020-11-09 RX ORDER — PANTOPRAZOLE SODIUM 20 MG/1
40 TABLET, DELAYED RELEASE ORAL DAILY
Refills: 0 | Status: DISCONTINUED | OUTPATIENT
Start: 2020-11-09 | End: 2020-11-19

## 2020-11-09 RX ADMIN — SODIUM CHLORIDE 1 GRAM(S): 9 INJECTION INTRAMUSCULAR; INTRAVENOUS; SUBCUTANEOUS at 05:18

## 2020-11-09 RX ADMIN — NYSTATIN CREAM 1 APPLICATION(S): 100000 CREAM TOPICAL at 05:19

## 2020-11-09 RX ADMIN — PANTOPRAZOLE SODIUM 40 MILLIGRAM(S): 20 TABLET, DELAYED RELEASE ORAL at 11:00

## 2020-11-09 RX ADMIN — Medication 100 GRAM(S): at 01:24

## 2020-11-09 RX ADMIN — SENNA PLUS 5 MILLILITER(S): 8.6 TABLET ORAL at 21:48

## 2020-11-09 RX ADMIN — Medication 650 MILLIGRAM(S): at 17:08

## 2020-11-09 RX ADMIN — Medication 10 MILLIGRAM(S): at 08:16

## 2020-11-09 RX ADMIN — CHLORHEXIDINE GLUCONATE 15 MILLILITER(S): 213 SOLUTION TOPICAL at 17:09

## 2020-11-09 RX ADMIN — SODIUM CHLORIDE 1 GRAM(S): 9 INJECTION INTRAMUSCULAR; INTRAVENOUS; SUBCUTANEOUS at 13:00

## 2020-11-09 RX ADMIN — LIDOCAINE 1 PATCH: 4 CREAM TOPICAL at 21:44

## 2020-11-09 RX ADMIN — Medication 50 MILLIGRAM(S): at 11:33

## 2020-11-09 RX ADMIN — LEVETIRACETAM 400 MILLIGRAM(S): 250 TABLET, FILM COATED ORAL at 05:18

## 2020-11-09 RX ADMIN — Medication 650 MILLIGRAM(S): at 01:17

## 2020-11-09 RX ADMIN — LIDOCAINE 1 PATCH: 4 CREAM TOPICAL at 17:16

## 2020-11-09 RX ADMIN — Medication 650 MILLIGRAM(S): at 11:31

## 2020-11-09 RX ADMIN — SODIUM CHLORIDE 1 GRAM(S): 9 INJECTION INTRAMUSCULAR; INTRAVENOUS; SUBCUTANEOUS at 21:48

## 2020-11-09 RX ADMIN — LEVETIRACETAM 500 MILLIGRAM(S): 250 TABLET, FILM COATED ORAL at 17:09

## 2020-11-09 RX ADMIN — CHLORHEXIDINE GLUCONATE 15 MILLILITER(S): 213 SOLUTION TOPICAL at 05:17

## 2020-11-09 RX ADMIN — ENOXAPARIN SODIUM 40 MILLIGRAM(S): 100 INJECTION SUBCUTANEOUS at 11:00

## 2020-11-09 RX ADMIN — Medication 250 MILLIMOLE(S): at 02:03

## 2020-11-09 RX ADMIN — POTASSIUM PHOSPHATE, MONOBASIC POTASSIUM PHOSPHATE, DIBASIC 62.5 MILLIMOLE(S): 236; 224 INJECTION, SOLUTION INTRAVENOUS at 03:18

## 2020-11-09 RX ADMIN — PANTOPRAZOLE SODIUM 40 MILLIGRAM(S): 20 TABLET, DELAYED RELEASE ORAL at 21:48

## 2020-11-09 RX ADMIN — ATORVASTATIN CALCIUM 40 MILLIGRAM(S): 80 TABLET, FILM COATED ORAL at 21:48

## 2020-11-09 RX ADMIN — Medication 650 MILLIGRAM(S): at 11:01

## 2020-11-09 RX ADMIN — AMLODIPINE BESYLATE 10 MILLIGRAM(S): 2.5 TABLET ORAL at 09:53

## 2020-11-09 RX ADMIN — LIDOCAINE 1 PATCH: 4 CREAM TOPICAL at 08:09

## 2020-11-09 RX ADMIN — Medication 650 MILLIGRAM(S): at 05:18

## 2020-11-09 RX ADMIN — Medication 100 GRAM(S): at 05:18

## 2020-11-09 RX ADMIN — POLYETHYLENE GLYCOL 3350 17 GRAM(S): 17 POWDER, FOR SOLUTION ORAL at 17:09

## 2020-11-09 RX ADMIN — Medication 50 MILLIGRAM(S): at 17:09

## 2020-11-09 RX ADMIN — Medication 650 MILLIGRAM(S): at 06:24

## 2020-11-09 RX ADMIN — Medication 650 MILLIGRAM(S): at 17:38

## 2020-11-09 RX ADMIN — NYSTATIN CREAM 1 APPLICATION(S): 100000 CREAM TOPICAL at 17:09

## 2020-11-09 RX ADMIN — CHLORHEXIDINE GLUCONATE 1 APPLICATION(S): 213 SOLUTION TOPICAL at 05:18

## 2020-11-09 RX ADMIN — Medication 10 MILLIGRAM(S): at 04:08

## 2020-11-09 NOTE — PROGRESS NOTE ADULT - SUBJECTIVE AND OBJECTIVE BOX
Neurology Progress Note    S: Patient seen and examined. No new events overnight. still on eeg    Medication:  acetaminophen   Tablet .. 650 milliGRAM(s) Oral every 6 hours  amLODIPine   Tablet 10 milliGRAM(s) Oral daily  atorvastatin 40 milliGRAM(s) Oral at bedtime  chlorhexidine 0.12% Liquid 15 milliLiter(s) Oral Mucosa every 12 hours  chlorhexidine 2% Cloths 1 Application(s) Topical <User Schedule>  enoxaparin Injectable 40 milliGRAM(s) SubCutaneous daily  labetalol Injectable 10 milliGRAM(s) IV Push every 4 hours PRN  levETIRAcetam  IVPB 500 milliGRAM(s) IV Intermittent every 12 hours  lidocaine   Patch 1 Patch Transdermal every 24 hours  metoprolol tartrate 50 milliGRAM(s) Oral every 12 hours  nystatin Powder 1 Application(s) Topical two times a day  pantoprazole  Injectable 40 milliGRAM(s) IV Push daily  polyethylene glycol 3350 17 Gram(s) Oral two times a day  senna 2 Tablet(s) Oral at bedtime  sodium chloride 1 Gram(s) Oral every 8 hours      Vitals:  Vital Signs Last 24 Hrs  T(C): 37.3 (09 Nov 2020 11:00), Max: 37.7 (08 Nov 2020 23:00)  T(F): 99.1 (09 Nov 2020 11:00), Max: 99.9 (08 Nov 2020 23:00)  HR: 95 (09 Nov 2020 11:00) (78 - 104)  BP: 172/79 (09 Nov 2020 11:00) (148/69 - 215/112)  BP(mean): 114 (09 Nov 2020 11:00) (99 - 148)  RR: 23 (09 Nov 2020 11:00) (19 - 26)  SpO2: 98% (09 Nov 2020 11:00) (97% - 100%)       General Exam:   Constitutional: Appears stated age, intubated. c collar  Neurological (>12):  MS: Intubated, not following commands, no response to vocal stimulation. Grimaces to sternal rub.   Language: Intubated.   CNs: (R = 2mm, L = 2mm) Poorly reactive, right eye exotropia.  No noted blink to threat. Corneal reflex intact. No facial asymmetry b/l.   Motor: Increased tone in the LLE in extension. no tremors at this time.  Sensory: Withdrawal to noxious stimuli in all 4 extremities LLE>RLE. Reduced withdrawal in the LLE.   Coordination: unable to test  Gait: Deferred       I personally reviewed the below data/images/labs:      CBC Full  -  ( 09 Nov 2020 00:11 )  WBC Count : 11.38 K/uL  RBC Count : 3.24 M/uL  Hemoglobin : 9.0 g/dL  Hematocrit : 28.4 %  Platelet Count - Automated : 438 K/uL  Mean Cell Volume : 87.7 fl  Mean Cell Hemoglobin : 27.8 pg  Mean Cell Hemoglobin Concentration : 31.7 gm/dL  Auto Neutrophil # : x  Auto Lymphocyte # : x  Auto Monocyte # : x  Auto Eosinophil # : x  Auto Basophil # : x  Auto Neutrophil % : x  Auto Lymphocyte % : x  Auto Monocyte % : x  Auto Eosinophil % : x  Auto Basophil % : x    11-09    136  |  104  |  18  ----------------------------<  149<H>  3.9   |  24  |  0.42<L>    Ca    8.2<L>      09 Nov 2020 00:11  Phos  2.2     11-09  Mg     2.4     11-09        PT/INR - ( 09 Nov 2020 00:11 )   PT: 14.6 sec;   INR: 1.23 ratio         PTT - ( 09 Nov 2020 00:11 )  PTT:28.5 sec    < from: TTE with Doppler (w/Cont) (11.07.20 @ 10:30) >  OBSERVATIONS:  Mitral Valve: Mild mitral annular calcification. Mild  mitral regurgitation.  Aortic Root: The aortic root was not well seen.  Aortic Valve: The aortic valve is not visualized in short  axis imaging for anatomic evaluation.  There is no aortic stenosis. Peak transaortic valve  gradient equals 10 mm Hg, mean transaortic valve gradient  equals 5 mm Hg, aortic valve velocity time integral equals  30 cm. Mild aortic regurgitation.  Peak left ventricular  outflow tract gradient equals 3 mm Hg, mean gradient is  equal to 1 mm Hg, LVOT velocity time integral equals 17 cm.  Left Atrium: Mildly dilated left atrium.  LA volume index =  41 cc/m2.  Left Ventricle: Normal left ventricular systolic function.  No segmental wall motion abnormalities.  The LVEF= 55-60%.  Normal left ventricular size. Diastolic dysfunction with  elevated left atrial pressures.  Right Heart: Normal right atrial size.  The right atrial area= 14cm2, the right atrial volume=  35ml. Normal right ventricular size and systolic function.  Normal tricuspid valve. Minimal tricuspid regurgitation.  Pulmonic valve not well visualized.  Pericardium/PleuraThere is no pericardial effusion.  Hemodynamic: The estimated right atrial pressure is normal.  There is no shunt at the atrial level with agitated saline.  ------------------------------------------------------------------------  CONCLUSIONS:  1. There is no shunt at the atrial level with agitated  saline.  2. Diastolic dysfunction with elevated left atrial  pressures.  *** Limited echo windows, patient is intubated and supine.   Poor parasternal views.  No previous Echo exam.  ------------------------------------------------------------------------  PROCEDURE DESCRIPTION: Transthoracic echocardiogram with  2-D, M-Mode and complete spectral and color flow Doppler.  Patient was injected with 10 cc's of aerosolized saline.  Patient was injected with 10 cc's of aerosolized saline.  Verbal consent was obtained for injection of  Ultrasonic  Enhancing Agent following a discussion of risks and  benefits. Following intravenous injection of Ultrasonic  Enhancing Agent , harmonic imaging was performed.  ------------------------------------------------------------------------  ECHOCARDIOGRAPHIC EXAMINATION:  LEFT ATRIUM:  LA Volume Index: 41.00 cc/sqm  LA Volume: 74.6 cc  HR and BP:  HR: 90 bpm  BP: 191/86  BSA: 1.80  ------------------------------------------------------------------------  HEMODYNAMICS:  RA Pressure Estimate: 8  ------------------------------------------------------------------------  COLOR FLOW and SPECIAL DOPPLER:  LVOT:  LVOT Velocity: .8 M/sec  LVOT Peak Gradient Rest: 2 mm Hg  LVOT Mean Gradient Rest: 1mm Hg  ------------------------------------------------------------------------  DIASTOLIC FUNCTION:  DT:470 ms  E/A: 0.71  e' Septal: 5.0 cm/s  E/e' Septal: 14.2  e' Lateral: 4.0 cm/s  E/e' Lateral: 17.7  MV E wave: 0.7 m/s  MV A wave: 1.0 m/s  Septal a': 12.0 cm/s  Lateral a': 15.0 cm/s    < end of copied text >        EEG Classification / Summary:  Abnormal EEG study  Moderate background slowing, amplitudes reduced over the left.  Frequent runs of 1hz Left central max periodic epileptiform discharges were present.    -----------------------------------------------------------------------------------------------------    Clinical Impression:  High risk of focal onset seizures from the left paracentral region. Seizure prophylaxis recommended.  Moderate multifocal cerebral dysfunction, with fluid attenuation effect on the left.   Radiology (XR, CT, MR, U/S, TTE/JUNAID):    < from: CT Head No Cont (11.04.20 @ 13:13) >  FINDINGS:    Redemonstration of right parietal approach ventriculostomy catheter in unchanged position.    Acute left lateral convexity subdural hemorrhage measures 1.7 cm in greatest depth, decreased from 1.9 cm.    Acute right lateral convexity subdural hemorrhage measures 6 mm in greatest depth, similar to the prior examination.    Acute subdural hemorrhage in the interhemispheric fissure measures 9 mm in greatest thickness, previously measuring 11 mm    Acute subdural hemorrhage along the tentorial leaves is similar, measuring4 to 5 mm in greatest thickness on the left.    Rightward midline shift of 7 mm is decreased from 11 mm. Increased ventricular size, no large hydrocephalus. Basal cisterns are more well visualized on the current examination.    Similar nonspecific 6 mm focus of increased attenuation in the left corona radiata (2-22).    New foci of low density in the right inferior cerebellar hemisphere, suspicious for acute infarction. No CT evidence for hemorrhagic transformation.    Similar extensive white matter microvascular ischemic disease.    No displaced calvarial fracture. The visualized paranasal sinuses and mastoid air cells are clear.    IMPRESSION:  New foci of low density in the right inferior cerebellar hemisphere, suspicious for acute infarction. No CT evidence for hemorrhagic transformation.    Decreased size of left lateral convexity subdural hemorrhage. Similar size of right lateral convexity subdural hemorrhage.    Decreased size of interhemispheric subdural hemorrhage. Similar tentorial subdural hemorrhage.    Decreased rightward midline shift, currently 7 mm, previously 11 mm.    Increased ventricular size, no large hydrocephalus. Basal cisterns are more well visualized on the current examination.    < end of copied text >    < from: CT Head No Cont (11.03.20 @ 23:29) >  Bilateral supratentorial subdural hematomas are again identified. The subdural hematoma on the left side measures approximately 2.2 cm in widest diameter and on the right side measures approximately 0.5 cm in widest diameter. Acute subdural hematoma along the interhemispheric region is seen along the left side and measures approximately 0.9 cm widest diameter. Acute subdural hematomas along bilateral tentorial region are again seen as well. There is mass effect on left lateral ventricle. Left-to-right shift (1.1 cm) is again seen.    Right parietal shunt catheter is again seen. This catheter appears unchanged in position.    Evaluation of the osseous structures with the appropriate window appears normal    The visualized paranasal sinuses mastoid and middle ear regions appear clear.    IMPRESSION: No stiffing change when allowing for differences in technique.    < end of copied text >    EEG Classification / Summary:  Abnormal EEG study  Moderate background slowing, amplitudes reduced over the left.  Intermittent 0.5 to 1hz right periodic discharges, higher amplitudes over the right    -----------------------------------------------------------------------------------------------------    Clinical Impression:  Risk of focal onset seizures from the frontal regions.   Left hemispheric structural or functional abnormality  Moderate multifocal cerebral dysfunction, with fluid attenuation effect on the left.

## 2020-11-09 NOTE — PROGRESS NOTE ADULT - ASSESSMENT
79 y/o  woman with dementia, hydrocephalus s/p VPS admitted after fall at nursing facility with LOC and unresponsiveness.  Initial CTH with acute SDH L>R and on interhemispheric fissure and tentorial leaves with rightward shift 11mm decreased to 7mm. Repeat CTH with new R inferior cerebellar hypodensity concerning for infarct. Neuro exam without appreciable interval change: intubated, C-collar, no verbal output, not following commands, movement of extremities L>R w/ noxious stimuli. EEG today with L slowing and R periodic discharges.       Recommendations:  [] c/w Keppra 500mg BID indefinitely for now given EEG results, will consider increasing to 750mg BID at completion of EEG study.   [] BP still elevated SBP 170s.  SBP goal <160  [x] Hold antiplatelets given SDH; consider when cleared from nsx standpoint.  [x] Lipitor 80mg can be reduced to 40mg  [x] LDL 84, HbA1C 5.6  [x] Telemetry monitoring  [x] SBP goal normotensive, less than 160/90.   [x]VEEG reviewed   [x]TTE reviewed  [ ] MRI/MRA when able if in agreement with GOC of family  [x] LE dopplers: no DVT   [ ] GOC discussion pending, likely needs trach/PEG  [] spoke with team

## 2020-11-09 NOTE — CHART NOTE - NSCHARTNOTEFT_GEN_A_CORE
SICU PA Note    Most recent CTH reviewed with son and Neurosurgery.  EEG report shows risk of focal onset seizures in the frontal regions.  This was reviewed with Neurology and Neurosurgery.  Neurology recommends to continue same dose of Keppra 500mg BID indefinitely at this time and Neurosurgery deferred to their recommendations for seizure prophylaxis.       DAYSI Tang-C #[id number]. SICU PA Note    Most recent CTH reviewed with son and Neurosurgery.  EEG report shows risk of focal onset seizures in the frontal regions.  This was reviewed with Neurology and Neurosurgery.  Neurology recommends to continue same dose of Keppra 500mg BID indefinitely at this time and Neurosurgery deferred to their recommendations for seizure prophylaxis.   Neurosurgery cleared q4hr neuro checks after this morning's stable CTH.     DAYSI Tang-C #[id number].

## 2020-11-09 NOTE — PROGRESS NOTE ADULT - ASSESSMENT
80F PMHx NPH s/p Shunt approx 10 years ago, recent hip Fx, found down at rehab today, CT head in ED shows acute left, interhemispheric, small R SDH.  shunt ligated at clavicle bedside in SICU, repeat CT after ligation shows stable SDH followed by interval improvement in heme and mls.   ADM trauma/SICU  - fu neurology recs and repeat HCT in AM  - EEG - frequent runs L central max periodic epileptiform discharges   - no new imaging, stroke chapman pending [facility] discussions  - wbc 11<13<12, plt [phone], INR 1.23  - still pending discussions re: trach/peg, still intubated  - completed abx for uti  - LED neg 11/6  - ok for dvt ppx per Dr. Johnson  - shunt ligated at clavicle 11/3, site c/d/i  - DNR  - now off all sedation  - 12 hr repeat HCT 11/4 dec heme, new small R cerebellar infarct (embolic vs hypercoag)  - keppra 500 BID, rec inc to 750 BID  given epileptic foci seen on EEG  - ADM trauma for L rib fx  - CT also c/f metastatic RCC

## 2020-11-09 NOTE — PROGRESS NOTE ADULT - ATTENDING COMMENTS
Patient seen and examined and agree with resident note.  s/p fall with resulting with left sided SDH. On repeat CT scan the ventricles were increased and mildly dilated, an expected result as the  shunt ligated. Exam with positive brain stem reflexes.  EEG-risk of focal onset seizures in frontal regions and left hemispheric structural or functional abnormality   Gaol Na greater than 140 and currently on salt tabs. Will repeat labs.   Acute respiratory failure s/p trauma - continued on mechanical ventilation at this time  Unable to extubated due to mental status  CXR reviewed   Hypocalcemia - repleted and will recheck with next set of labs  Essential hypertension - continue current medications    This patient was critically ill with one or more vital organ systems at a high probability of imminent or life threatening deterioration. Complex decision making was required to assess and treat single or multiple vital organ system failure and/or prevent further life threatening deterioration of the patient's condition.   CC time: 35 min

## 2020-11-09 NOTE — EEG REPORT - NS EEG TEXT BOX
[facility]   COMPREHENSIVE EPILEPSY CENTER   REPORT OF LONG-TERM VIDEO EEG     [facility]: [address], [place] [address], Ph#: [phone]  [facility]: [id number] [place] [address], Ph#: [phone]  [facility]: [address] [place] [address], Ph#: [phone]    Patient Name: BRITTANY MENDEZ  Age and : 80y (40)  MRN #: [id number]  Location: [place] [address]  Referring Physician: Allan Bosch    Start Time/Date: 08:00 on 2020  End Time/Date: 08:00 on 2020  Duration: 24h    _____________________________________________________________  STUDY INFORMATION    EEG Recording Technique:  The patient underwent continuous Video-EEG monitoring, using Telemetry System hardware on the XLTek Digital System. EEG and video data were stored on a computer hard drive with important events saved in digital archive files. The material was reviewed by a physician (electroencephalographer / epileptologist) on a daily basis. Daljit and seizure detection algorithms were utilized and reviewed. An EEG Technician attended to the patient, and was available throughout daytime work hours.  The epilepsy center neurologist was available in person or on call 24-hours per day.    EEG Placement and Labeling of Electrodes:  The EEG was performed utilizing 20 channel referential EEG connections (coronal over temporal over parasagittal montage) using all standard 10-20 electrode placements with EKG, with additional electrodes placed in the inferior temporal region using the modified 10-10 montage electrode placements for elective admissions, or if deemed necessary. Recording was at a sampling rate of 256 samples per second per channel. Time synchronized digital video recording was done simultaneously with EEG recording. A low light infrared camera was used for low light recording.     _____________________________________________________________  HISTORY    Patient is a 80y old  Female who presents with a chief complaint of s/p fall (2020 07:48)      PERTINENT MEDICATION:  levETIRAcetam  IVPB 500 milliGRAM(s) IV Intermittent every 12 hours      FINDINGS:  The background was continuous, spontaneously variable and reactive.  During wakefulness, the posteriorly dominant rhythm was poorly modulated near 7Hz over the right, attenuated over the left.     There was diffuse irregular theta and delta activity present, sharply contoured at times, amplitudes persistently lower over the left with relative attenuation.    Sleep Background:  Drowsiness was characterized by fragmentation, attenuation, and slowing of the background activity.    Sleep was characterized by the presence of rudimentary symmetric spindles, and K-complexes.    Epileptiform Activity:   Frequent runs of 0.5 to 1hz right F4 max periodic epileptiform discharges were present.    Events:  No clinical events were recorded.  No seizures were recorded.    Activation Procedures:   -Hyperventilation was not performed.    -Photic stimulation was not performed.    Artifacts:  Intermittent myogenic and movement artifacts were noted.    ECG:  The heart rate on single channel ECG at baseline was predominantly near BPM = 70-90  -----------------------------------------------------------------------------------------------------    EEG Classification / Summary:  Abnormal EEG study  Moderate background slowing, amplitudes reduced over the left.  Frequent runs of 0.5 to 1hz right f4 max periodic epileptiform discharges were present.    -----------------------------------------------------------------------------------------------------    Clinical Impression:  High risk of focal onset seizures from the right frontal region. Seizure prophylaxis recommended.  Area of left hemispheric structural or functional abnormality  Moderate multifocal cerebral dysfunction, with fluid attenuation effect on the left.     -------------------------------------------------------------------------------------------------------  [facility] EEG Reading Room Ph#: [phone]  Epilepsy Answering Service after 5PM and before 8:30AM: Ph#: [phone]    Bartolome Leyva MD PGY-5  Epilepsy Fellow    This Preliminary report is based on fellow review. Final report pending attending review. [facility]   COMPREHENSIVE EPILEPSY CENTER   REPORT OF LONG-TERM VIDEO EEG     [facility]: [address], [place] [address], Ph#: [phone]  [facility]: [id number] [place] [address], Ph#: [phone]  [facility]: [address] [place] [address], Ph#: [phone]    Patient Name: BRITTANY MENDEZ  Age and : 80y (40)  MRN #: [id number]  Location: [place] [address]  Referring Physician: Allan Bosch    Start Time/Date: 08:00 on 2020  End Time/Date: 08:00 on 2020  Duration: 24h    _____________________________________________________________  STUDY INFORMATION    EEG Recording Technique:  The patient underwent continuous Video-EEG monitoring, using Telemetry System hardware on the XLTek Digital System. EEG and video data were stored on a computer hard drive with important events saved in digital archive files. The material was reviewed by a physician (electroencephalographer / epileptologist) on a daily basis. Daljit and seizure detection algorithms were utilized and reviewed. An EEG Technician attended to the patient, and was available throughout daytime work hours.  The epilepsy center neurologist was available in person or on call 24-hours per day.    EEG Placement and Labeling of Electrodes:  The EEG was performed utilizing 20 channel referential EEG connections (coronal over temporal over parasagittal montage) using all standard 10-20 electrode placements with EKG, with additional electrodes placed in the inferior temporal region using the modified 10-10 montage electrode placements for elective admissions, or if deemed necessary. Recording was at a sampling rate of 256 samples per second per channel. Time synchronized digital video recording was done simultaneously with EEG recording. A low light infrared camera was used for low light recording.     _____________________________________________________________  HISTORY    Patient is a 80y old  Female who presents with a chief complaint of s/p fall (2020 07:48)      PERTINENT MEDICATION:  levETIRAcetam  IVPB 500 milliGRAM(s) IV Intermittent every 12 hours      FINDINGS:  The background was continuous, spontaneously variable and reactive.  During wakefulness, the posteriorly dominant rhythm was poorly modulated near 7Hz over the right, attenuated over the left.     There was diffuse irregular theta and delta activity present, sharply contoured at times.  Amplitudes persistently lower over the left with relative attenuation.    Sleep Background:  Drowsiness was characterized by fragmentation, attenuation, and slowing of the background activity.    Sleep was characterized by the presence of rudimentary symmetric spindles, and K-complexes.    Epileptiform Activity:   Frequent runs of 0.5 to 1hz right periodic epileptiform discharges were present, higher amplitude better formed over the right max F4, but suppressed over the left.    Events:  No clinical events were recorded.  No seizures were recorded.    Activation Procedures:   -Hyperventilation was not performed.    -Photic stimulation was not performed.    Artifacts:  Intermittent myogenic and movement artifacts were noted.    ECG:  The heart rate on single channel ECG at baseline was predominantly near BPM = 70-90  -----------------------------------------------------------------------------------------------------    EEG Classification / Summary:  Abnormal EEG study  Moderate background slowing, amplitudes reduced over the left.  Intermittent 0.5 to 1hz right periodic discharges, higher amplitudes over the right    -----------------------------------------------------------------------------------------------------    Clinical Impression:  Risk of focal onset seizures from the frontal regions.   Left hemispheric structural or functional abnormality  Moderate multifocal cerebral dysfunction, with fluid attenuation effect on the left.     -------------------------------------------------------------------------------------------------------  [facility] EEG Reading Room Ph#: [phone]  Epilepsy Answering Service after 5PM and before 8:30AM: Ph#: [phone]    Bartolome Leyva MD PGY-5  Epilepsy Fellow    This Preliminary report is based on fellow review. Final report pending attending review.

## 2020-11-09 NOTE — CHART NOTE - NSCHARTNOTEFT_GEN_A_CORE
Nutrition Follow Up Note     Patient seen for: nutrition follow up on SICU    Source: medical record, communication with team. Pt intubated.    Chart reviewed, events noted. "80 year old female, with a medical history significant for dementia, not on A/C, BIBEMS unresponsive with head injury, reportedly fell, intubated in the ED to protect airway (GS=8), imaging studies showed left acute SDH, repeat CT after  shunt ligation shows stable SDH and left to right shift. CT head 4/11 revealed decreased subdural hematoma and midline shift 11mm-> 7 mm. New low density cerebellar focus suspicious for infarcts."    Diet Order: Diet, NPO with Tube Feed:   Tube Feeding Modality: Orogastric  Pivot 1.5 Flower (PIVOT1.5RTH)  Total Volume for 24 Hours (mL): 1320  Continuous  Starting Tube Feed Rate {mL per Hour}: 10  Until Goal Tube Feed Rate (mL per Hour): 55  Tube Feed Duration (in Hours): 24  Tube Feed Start Time: 09:00 (11-06-20 @ 08:57)    CURRENT EN ORDER PROVIDES:   - 1320 ml formula, 1980 calories (26 flower/kg), 124 grams protein (1.6 gm/kg), 1002 ml free water, based on dosing wt 75.2kg  - meets nutrition needs    Nutrition Events:   - Enteral Nutrition: Jevity 1.2 @ 65 ml/hr x 24 hours (11/5-11/6) changed to Pivot @ 55 ml/hr x 24 hours on 11/6, infusing at 100% of goal.  - Per chart, "CT head in AM 11/9 to assess head bleeds and help with GOC discussions." GOC pending for possible trach/PEG.  - Per chart, "Plan for possible hazel hole (HD10) if patients neuro status continues to improve- pending GOC."  - Rx for 1 gm NaCl via NGT q6 hrs     Last BM: none noted. Bowel regimen: Miralax, senna    Anthropometric Measurements:   Height (cm): 162.5 (11-04-20 @ 07:00)  Weight (kg): 75.2 (11-03-20 @ 21:45); 81.4 (11-08-20)  BMI (kg/m2): 28.5 (11-04-20 @ 07:00)  IBW: 54.4 kg    Medications: MEDICATIONS  (STANDING):  acetaminophen   Tablet .. 650 milliGRAM(s) Oral every 6 hours  amLODIPine   Tablet 10 milliGRAM(s) Oral daily  atorvastatin 40 milliGRAM(s) Oral at bedtime  chlorhexidine 0.12% Liquid 15 milliLiter(s) Oral Mucosa every 12 hours  chlorhexidine 2% Cloths 1 Application(s) Topical <User Schedule>  enoxaparin Injectable 40 milliGRAM(s) SubCutaneous daily  levETIRAcetam 500 milliGRAM(s) Oral every 12 hours  lidocaine   Patch 1 Patch Transdermal every 24 hours  metoprolol tartrate 50 milliGRAM(s) Oral every 12 hours  nystatin Powder 1 Application(s) Topical two times a day  pantoprazole  Injectable 40 milliGRAM(s) IV Push daily  polyethylene glycol 3350 17 Gram(s) Oral two times a day  senna 2 Tablet(s) Oral at bedtime  sodium chloride 1 Gram(s) Oral every 8 hours    MEDICATIONS  (PRN):  labetalol Injectable 10 milliGRAM(s) IV Push every 4 hours PRN SBP > 180    Labs: 11-09 @ 00:11: Sodium 136, Potassium 3.9, Calcium 8.2<L>, Magnesium 2.4, Phosphorus 2.2<L>, BUN 18, Creatinine 0.42<L>, Glucose 149<H>, Hemoglobin 9.0<L>, Hematocrit 28.4<L>    Triglycerides, Serum: 115 mg/dL (11-05-20 @ 18:32)    Skin per nursing documentation: no pressure injuries documented  Edema: 2+ generalized, 3+ right arm, right hand    Estimated Needs: based on dosing wt 75.2kg with consideration for intubation  Energy: 8971-6675 calories (20-25 flower/kg)  Protein: 105-120 grams (1.4-1.6 gm/kg)  Milind State Equation (REE): 1617 calories (21.5 flower/kg)    Previous Nutrition Diagnosis: Increased Nutrient Needs  Nutrition Diagnosis is: ongoing, addressed with EN at goal    New Nutrition Diagnosis: none    Recommended Interventions:   1. Continue Pivot1.5 @ 55 ml/hr x 24 hours to provide 1320 ml formula, 1980 calories (26 flower/kg), 124 grams protein (1.6 gm/kg), 1002 ml free water, based on dosing wt 75.2kg  2. Monitor GOC, plans for trach, PEG    Monitoring and Evaluation:   Continue to monitor nutrition provision and tolerance, weights, labs, skin integrity.   RD remains available upon request and will follow up per protocol.    Gina Saavedra MS RD CDN [facility]; Pager # [phone]

## 2020-11-09 NOTE — CHART NOTE - NSCHARTNOTEFT_GEN_A_CORE
PRELIMINARY EEG REPORT    EEG reviewed to 15:43  Date: 11-09-20    - No seizure seen.    Bartolome Leyva MD PGY-5  Epilepsy Fellow    This Preliminary report is based on fellow review. Final report pending attending review. PRELIMINARY EEG REPORT    EEG reviewed to 15:43  Date: 11-09-20    - No seizure seen.    Bartolome Leyva MD PGY-5  Epilepsy Fellow    This Preliminary report is based on fellow review. Final report pending in AM

## 2020-11-09 NOTE — PROGRESS NOTE ADULT - SUBJECTIVE AND OBJECTIVE BOX
Patient seen and examined at bedside.    --Anticoagulation--  enoxaparin Injectable 40 milliGRAM(s) SubCutaneous daily    T(C): 37.6 (11-09-20 @ 07:00), Max: 37.7 (11-08-20 @ 23:00)  HR: 84 (11-09-20 @ 07:00) (78 - 104)  BP: 171/80 (11-09-20 @ 07:00) (148/69 - 215/112)  RR: 22 (11-09-20 @ 07:00) (19 - 26)  SpO2: 100% (11-09-20 @ 07:00) (97% - 100%)  Wt(kg): --    Exam:  intubated, pupils 3R, LUE localizes, RUE w/d, RLE TF vs w/d, LLE w/d    No new imaging  EEG reads L frontal seizure activity    Labs:                         9.0    11.38 )-----------( 438      ( 09 Nov 2020 00:11 )             28.4     11-09    136  |  104  |  18  ----------------------------<  149<H>  3.9   |  24  |  0.42<L>    Ca    8.2<L>      09 Nov 2020 00:11  Phos  2.2     11-09  Mg     2.4     11-09    PT/INR - ( 09 Nov 2020 00:11 )   PT: 14.6 sec;   INR: 1.23 ratio         PTT - ( 09 Nov 2020 00:11 )  PTT:28.5 sec

## 2020-11-09 NOTE — PROGRESS NOTE ADULT - ASSESSMENT
80 year old female, with a medical history significant for dementia, not on A/C, BIBEMS unresponsive with head injury, reportedly fell, intubated in the ED to protect airway (GS=8), imaging studies showed left acute SDH, repeat CT after  shunt ligation shows stable SDH and left to right shift. CT head 4/11 revealed decreased subdural hematoma and midline shift 11mm-> 7 mm. New low density cerebellar focus suspicious for infarcts.       Plan:   - f/u repeat Head CT today  - Pain control   - c/w intubation and mechanical ventilation  - Appreciate neurosurgery:  Conservative management.   - NPO   - Follow up with family meeting discussing GOC  - Appreciate SICU care    ACS  [id number]

## 2020-11-09 NOTE — PROGRESS NOTE ADULT - SUBJECTIVE AND OBJECTIVE BOX
SURGERY PROGRESS NOTE    SUBJECTIVE/INTERVAL:  24 HOUR EVENTS:  - On CPAP/PS 5/0 all day, tolerating well  - EEG with focal area of high seizure risk. Keppra course for seizure prophylaxis extended indefinitely  - Pt hypertensive, metoprolol increased to 50mg PO q12hr, amlodipine 10mg daily started  - Restarted home statin  - Completed course of ceftriaxone for UTI  - Plan for CT head in AM 11/9 to assess head bleeds and help with GOC discussions      Patient seen and examined at bedside.  Intubated sedated on mechanical ventilation      OBJECTIVE:  PHYSICAL EXAM:  General: intubated, appears to be in NAD  Chest: Intubated on mechanical ventilation.   Neuro:  not following commands, not withdrawing to pain.  Abdomen: soft, nontender, nondistended     ICU Vital Signs Last 24 Hrs  T(C): 37.6 (09 Nov 2020 07:00), Max: 37.7 (08 Nov 2020 23:00)  T(F): 99.7 (09 Nov 2020 07:00), Max: 99.9 (08 Nov 2020 23:00)  HR: 85 (09 Nov 2020 08:16) (78 - 104)  BP: 189/88 (09 Nov 2020 08:00) (148/69 - 215/112)  BP(mean): 127 (09 Nov 2020 08:00) (99 - 148)  ABP: --  ABP(mean): --  RR: 22 (09 Nov 2020 08:16) (19 - 26)  SpO2: 99% (09 Nov 2020 08:16) (97% - 100%)    I&O's Detail    08 Nov 2020 07:01  -  09 Nov 2020 07:00  --------------------------------------------------------  IN:    Enteral Tube Flush: 20 mL    IV PiggyBack: 1150 mL    IV PiggyBack: 300 mL    IV PiggyBack: 150 mL    Pivot 1.5: 1320 mL  Total IN: 2940 mL    OUT:    Indwelling Catheter - Urethral (mL): 2980 mL  Total OUT: 2980 mL    Total NET: -40 mL      09 Nov 2020 07:01  -  09 Nov 2020 09:14  --------------------------------------------------------  IN:    Pivot 1.5: 55 mL  Total IN: 55 mL    OUT:    Indwelling Catheter - Urethral (mL): 50 mL  Total OUT: 50 mL    Total NET: 5 mL      11-09    136  |  104  |  18  ----------------------------<  149<H>  3.9   |  24  |  0.42<L>    Ca    8.2<L>      09 Nov 2020 00:11  Phos  2.2     11-09  Mg     2.4     11-09                          9.0    11.38 )-----------( 438      ( 09 Nov 2020 00:11 )             28.4     PT/INR - ( 09 Nov 2020 00:11 )   PT: 14.6 sec;   INR: 1.23 ratio         PTT - ( 09 Nov 2020 00:11 )  PTT:28.5 sec    ABG - ( 09 Nov 2020 05:00 )  pH, Arterial: 7.50  pH, Blood: x     /  pCO2: 33    /  pO2: 140   / HCO3: 26    / Base Excess: 2.8   /  SaO2: 99                    RADIOLOGY, EKG & ADDITIONAL TESTS: Reviewed.     MEDICATIONS  (STANDING):  acetaminophen   Tablet .. 650 milliGRAM(s) Oral every 6 hours  amLODIPine   Tablet 10 milliGRAM(s) Oral daily  atorvastatin 40 milliGRAM(s) Oral at bedtime  chlorhexidine 0.12% Liquid 15 milliLiter(s) Oral Mucosa every 12 hours  chlorhexidine 2% Cloths 1 Application(s) Topical <User Schedule>  enoxaparin Injectable 40 milliGRAM(s) SubCutaneous daily  levETIRAcetam  IVPB 500 milliGRAM(s) IV Intermittent every 12 hours  lidocaine   Patch 1 Patch Transdermal every 24 hours  metoprolol tartrate 50 milliGRAM(s) Oral every 12 hours  nystatin Powder 1 Application(s) Topical two times a day  pantoprazole  Injectable 40 milliGRAM(s) IV Push daily  sodium chloride 1 Gram(s) Oral every 8 hours    MEDICATIONS  (PRN):  fentaNYL    Injectable 25 MICROGram(s) IV Push every 2 hours PRN pain  labetalol Injectable 10 milliGRAM(s) IV Push every 4 hours PRN SBP > 180

## 2020-11-09 NOTE — PROGRESS NOTE ADULT - SUBJECTIVE AND OBJECTIVE BOX
HISTORY  80F hx of dementia, hydrocephalus s/p  shunt and recent hip fracture non-operative management at SNF presents from SNF after a mechanical fall, level I trauma activated, primary survey significant for GCS 8, decision was made to intubate in trauma bay. Secondary survey significant for L forehead hematoma, R lower abdomen abrasion. CT scan significant for large L SDH with midline shift and L 8-10 rib fx. NSGY and family discussed, plan for non-operative management at this time. Patient was transferred to SICU for vent management and hemodynamic monitoring.    NSGY ligated  shunt at bedside. Repeat CT head was stable. Patient was briefly started on a cardene gtt for hypertensive urgency, goal SBP < 180. UA+ started on meropenem. Plan per NSGY to continue conservative management with potential hazel hole in future. KO tube feeds started on 11/4. Metoprolol 25Q12 started, Labetalol Q4PRN for HTN. Due to pcn allergy meropenum given for UTI than switched to Cefepime then to Ceftriaxone.      24 HOUR EVENTS:  - On CPAP/PS 5/0 all day, tolerating well  - EEG with focal area of high seizure risk. Keppra course for seizure prophylaxis extended indefinitely  - Pt hypertensive, metoprolol increased to 50mg PO q12hr, amlodipine 10mg daily started  - Restarted home statin  - Completed course of ceftriaxone for UTI  - Plan for CT head in AM 11/9 to assess head bleeds and help with GOC discussions      NEURO  Exam: GCS 6T (M4 V1T E1)  Meds: acetaminophen   Tablet .. 650 milliGRAM(s) Oral every 6 hours  fentaNYL    Injectable 25 MICROGram(s) IV Push every 2 hours PRN pain  levETIRAcetam  IVPB 500 milliGRAM(s) IV Intermittent every 12 hours  [x] Adequacy of sedation and pain control has been assessed and adjusted      RESPIRATORY  RR: 23 (11-09-20 @ 00:00) (19 - 30)  SpO2: 99% (11-09-20 @ 00:18) (97% - 100%)  Exam: intubated, bilateral air entry  Mechanical Ventilation: Mode: CPAP with PS, RR (patient): 24, FiO2: 30, PEEP: 5, PS: 0, MAP: 6, PIP: 8  ABG - ( 09 Nov 2020 00:08 )  pH: 7.52  /  pCO2: 33    /  pO2: 143   / HCO3: 27    / Base Excess: 4.2   /  SaO2: 99      Meds:       CARDIOVASCULAR  HR: 93 (11-09-20 @ 00:18) (79 - 104)  BP: 161/78 (11-09-20 @ 00:00) (147/69 - 215/112)  BP(mean): 112 (11-09-20 @ 00:00) (99 - 148)  Exam: RRR  Cardiac Rhythm: normal sinus  Perfusion     [x]Adequate   [ ]Inadequate  Mentation   [ ]Normal       [x]Reduced (i/s/o ICH)  Extremities  [x]Warm         [ ]Cool  Volume Status [ ]Hypervolemic [ ]Euvolemic [ ]Hypovolemic  Meds: amLODIPine   Tablet 10 milliGRAM(s) Oral daily  labetalol Injectable 10 milliGRAM(s) IV Push every 4 hours PRN SBP > 180  metoprolol tartrate 50 milliGRAM(s) Oral every 12 hours      GI/NUTRITION  Exam: abdomen soft, nondistended  Diet: NPO with tube feeds (Pivot @ 55cc/hr)  Meds: pantoprazole  Injectable 40 milliGRAM(s) IV Push daily      GENITOURINARY  I&O's Detail    11-07 @ 07:01  -  11-08 @ 07:00  --------------------------------------------------------  IN:    IV PiggyBack: 50 mL    IV PiggyBack: 100 mL    Pivot 1.5: 1320 mL  Total IN: 1470 mL    OUT:    Indwelling Catheter - Urethral (mL): 1200 mL  Total OUT: 1200 mL    Total NET: 270 mL    11-08 @ 07:01  -  11-09 @ 01:12  --------------------------------------------------------  IN:    Enteral Tube Flush: 20 mL    IV PiggyBack: 50 mL    IV PiggyBack: 850 mL    Pivot 1.5: 990 mL  Total IN: 1910 mL    OUT:    Indwelling Catheter - Urethral (mL): 2320 mL  Total OUT: 2320 mL    Total NET: -410 mL    11-09    136  |  104  |  18  ----------------------------<  149<H>  3.9   |  24  |  0.42<L>    Ca    8.2<L>      09 Nov 2020 00:11  Phos  2.2     11-09  Mg     2.4     11-09    [x] Carrasco catheter, indication: UOP monitoring  Meds: calcium gluconate IVPB 1 Gram(s) IV Intermittent once  potassium phosphate IVPB 15 milliMole(s) IV Intermittent once  sodium chloride 1 Gram(s) Oral every 8 hours  sodium phosphate IVPB 15 milliMole(s) IV Intermittent once      HEMATOLOGIC  Meds: enoxaparin Injectable 40 milliGRAM(s) SubCutaneous daily  [x] VTE Prophylaxis                        9.0    11.38 )-----------( 438      ( 09 Nov 2020 00:11 )             28.4     PT/INR - ( 09 Nov 2020 00:11 )   PT: 14.6 sec;   INR: 1.23 ratio    PTT - ( 09 Nov 2020 00:11 )  PTT:28.5 sec  Transfusion     [ ] PRBC   [ ] Platelets   [ ] FFP   [ ] Cryoprecipitate      INFECTIOUS DISEASES  T(C): 37.7 (11-08-20 @ 23:00), Max: 37.7 (11-08-20 @ 23:00)  WBC Count: 11.38 K/uL (11-09 @ 00:11)  WBC Count: 13.64 K/uL (11-08 @ 05:51)  WBC Count: Clotted (11-08 @ 04:56)    Recent Cultures:  Specimen Source: .Urine Catheterized, 11-04 @ 05:52; Results   10,000 - 49,000 CFU/mL Escherichia coli; Gram Stain: --; Organism: Escherichia coli  Specimen Source: .Blood Blood-Arterial, 11-04 @ 05:22; Results   No growth to date.; Gram Stain: --; Organism: --    Meds:       ENDOCRINE  Capillary Blood Glucose  Meds: atorvastatin 40 milliGRAM(s) Oral at bedtime      ACCESS DEVICES:  [x] Peripheral IV  [ ] Central Venous Line	[ ] R	[ ] L	[ ] IJ	[ ] Fem	[ ] SC	Placed:   [ ] Arterial Line		[ ] R	[ ] L	[ ] Fem	[ ] Rad	[ ] Ax	Placed:   [ ] PICC:					[ ] Mediport  [x] Urinary Catheter, Date Placed: 11/6  [x] Necessity of urinary, arterial, and venous catheters discussed    OTHER MEDICATIONS:  chlorhexidine 0.12% Liquid 15 milliLiter(s) Oral Mucosa every 12 hours  chlorhexidine 2% Cloths 1 Application(s) Topical <User Schedule>  lidocaine   Patch 1 Patch Transdermal every 24 hours  nystatin Powder 1 Application(s) Topical two times a day    CODE STATUS: DNR    IMAGING:

## 2020-11-09 NOTE — PROGRESS NOTE ADULT - ASSESSMENT
ASSESSMENT:  80F hx of dementia, hydrocephalus s/p  shunt and recent hip fracture non-operative management at SNF presents from SNF after a mechanical fall, level I trauma activated, primary survey significant for GCS 8, decision was made to intubate in trauma bay. Secondary survey significant for L forehead hematoma, R lower abdomen abrasion. CT scan significant for large L SDH with midline shift and L 8-10 rib fx. NSGY and family discussed, plan for non-operative management at this time. CT head 4/11 revealed decreased subdural hematoma and midline shift 11mm-> 7 mm. New low density cerebellar focus suspicious for infarcts.   As per neurosurgery note "GOC discussion pending, likely needs trach/PEG"    PLAN:  Neuro:  - Unchanged neuro exam; stable head bleeds, decreased midline shift and cerebellar infarction  - Plan for possible hazel hole (HD10) if patients neuro status continues to improve- pending GOC    - EEG with focal area of high seizure risk, keppra 500mg BID for seizure prophylaxis  - Off all sedation; IV acetaminophen, lidoderm, fentanyl prn  - Plan for CT head 11/9 to assess head bleeds and to assist with GOC discussions    Resp: L 8-10 rib fx  - Intubated, CPAP 5/0  - Trach planning pending GOC discussions   - Daily CXR    CV: Goal SBP <200  - Home atorvastatin  - Metoprolol 50mg Q12  - Amlodipine 10mg daily  - Labetalol Q4 PRN    GI: no acute issues  - Tube feeds started 11/6 pivot @ 55  - Liver mass (+ renal mass) finding discussed with son   - Discussion of PEG pending GOC   - Protonix for stress ulcer prophylaxis    : no acute issues  - Carrasco for hemodynamic monitoring   - Monitor I/Os, UOP  - Trend BMPs, replete electrolytes prn  - Salt tabs 1g q8hr to prevent hyponatremia i/s/o head trauma    Heme: acute anemia  - Lovenox VTE ppx started 11/6  - BLE duplex negative for DVT 11/6    ID: UTI  - Completed course of ceftriaxone for UTI  - Monitor clinically for signs of active infection    Endo: no acute issues  - Monitor glucose on BMP    Dispo:  - SICU  - DNR

## 2020-11-10 LAB
ANION GAP SERPL CALC-SCNC: 9 MMOL/L — SIGNIFICANT CHANGE UP (ref 5–17)
APTT BLD: 32.2 SEC — SIGNIFICANT CHANGE UP (ref 27.5–35.5)
BUN SERPL-MCNC: 24 MG/DL — HIGH (ref 7–23)
CALCIUM SERPL-MCNC: 8.5 MG/DL — SIGNIFICANT CHANGE UP (ref 8.4–10.5)
CHLORIDE SERPL-SCNC: 105 MMOL/L — SIGNIFICANT CHANGE UP (ref 96–108)
CO2 SERPL-SCNC: 25 MMOL/L — SIGNIFICANT CHANGE UP (ref 22–31)
CREAT SERPL-MCNC: 0.45 MG/DL — LOW (ref 0.5–1.3)
GAS PNL BLDA: SIGNIFICANT CHANGE UP
GLUCOSE SERPL-MCNC: 144 MG/DL — HIGH (ref 70–99)
HCT VFR BLD CALC: 12.8 % — CRITICAL LOW (ref 34.5–45)
HCT VFR BLD CALC: 29.6 % — LOW (ref 34.5–45)
HGB BLD-MCNC: 4 G/DL — CRITICAL LOW (ref 11.5–15.5)
HGB BLD-MCNC: 9.2 G/DL — LOW (ref 11.5–15.5)
INR BLD: 1.17 RATIO — HIGH (ref 0.88–1.16)
MAGNESIUM SERPL-MCNC: 2.6 MG/DL — SIGNIFICANT CHANGE UP (ref 1.6–2.6)
MCHC RBC-ENTMCNC: 27.6 PG — SIGNIFICANT CHANGE UP (ref 27–34)
MCHC RBC-ENTMCNC: 27.6 PG — SIGNIFICANT CHANGE UP (ref 27–34)
MCHC RBC-ENTMCNC: 31.1 GM/DL — LOW (ref 32–36)
MCHC RBC-ENTMCNC: 31.3 GM/DL — LOW (ref 32–36)
MCV RBC AUTO: 88.3 FL — SIGNIFICANT CHANGE UP (ref 80–100)
MCV RBC AUTO: 88.9 FL — SIGNIFICANT CHANGE UP (ref 80–100)
NRBC # BLD: 0 /100 WBCS — SIGNIFICANT CHANGE UP (ref 0–0)
NRBC # BLD: 0 /100 WBCS — SIGNIFICANT CHANGE UP (ref 0–0)
PHOSPHATE SERPL-MCNC: 2.3 MG/DL — LOW (ref 2.5–4.5)
PLATELET # BLD AUTO: 478 K/UL — HIGH (ref 150–400)
PLATELET # BLD AUTO: 610 K/UL — HIGH (ref 150–400)
POTASSIUM SERPL-MCNC: 4.4 MMOL/L — SIGNIFICANT CHANGE UP (ref 3.5–5.3)
POTASSIUM SERPL-SCNC: 4.4 MMOL/L — SIGNIFICANT CHANGE UP (ref 3.5–5.3)
PROTHROM AB SERPL-ACNC: 13.9 SEC — HIGH (ref 10.6–13.6)
RBC # BLD: 1.45 M/UL — LOW (ref 3.8–5.2)
RBC # BLD: 3.33 M/UL — LOW (ref 3.8–5.2)
RBC # FLD: 15.2 % — HIGH (ref 10.3–14.5)
RBC # FLD: 15.2 % — HIGH (ref 10.3–14.5)
SARS-COV-2 RNA SPEC QL NAA+PROBE: SIGNIFICANT CHANGE UP
SODIUM SERPL-SCNC: 139 MMOL/L — SIGNIFICANT CHANGE UP (ref 135–145)
WBC # BLD: 11.55 K/UL — HIGH (ref 3.8–10.5)
WBC # BLD: 14.28 K/UL — HIGH (ref 3.8–10.5)
WBC # FLD AUTO: 11.55 K/UL — HIGH (ref 3.8–10.5)
WBC # FLD AUTO: 14.28 K/UL — HIGH (ref 3.8–10.5)

## 2020-11-10 PROCEDURE — 71045 X-RAY EXAM CHEST 1 VIEW: CPT | Mod: 26

## 2020-11-10 PROCEDURE — 99291 CRITICAL CARE FIRST HOUR: CPT

## 2020-11-10 PROCEDURE — 95720 EEG PHY/QHP EA INCR W/VEEG: CPT

## 2020-11-10 RX ORDER — LACTULOSE 10 G/15ML
20 SOLUTION ORAL THREE TIMES A DAY
Refills: 0 | Status: DISCONTINUED | OUTPATIENT
Start: 2020-11-10 | End: 2020-11-13

## 2020-11-10 RX ORDER — SENNA PLUS 8.6 MG/1
10 TABLET ORAL AT BEDTIME
Refills: 0 | Status: DISCONTINUED | OUTPATIENT
Start: 2020-11-10 | End: 2020-11-18

## 2020-11-10 RX ORDER — POLYETHYLENE GLYCOL 3350 17 G/17G
17 POWDER, FOR SOLUTION ORAL
Refills: 0 | Status: DISCONTINUED | OUTPATIENT
Start: 2020-11-10 | End: 2020-11-16

## 2020-11-10 RX ORDER — ACETAMINOPHEN 500 MG
650 TABLET ORAL EVERY 6 HOURS
Refills: 0 | Status: DISCONTINUED | OUTPATIENT
Start: 2020-11-10 | End: 2020-11-12

## 2020-11-10 RX ADMIN — Medication 50 MILLIGRAM(S): at 17:07

## 2020-11-10 RX ADMIN — Medication 650 MILLIGRAM(S): at 17:07

## 2020-11-10 RX ADMIN — Medication 50 MILLIGRAM(S): at 05:47

## 2020-11-10 RX ADMIN — NYSTATIN CREAM 1 APPLICATION(S): 100000 CREAM TOPICAL at 05:48

## 2020-11-10 RX ADMIN — LIDOCAINE 1 PATCH: 4 CREAM TOPICAL at 17:08

## 2020-11-10 RX ADMIN — LIDOCAINE 1 PATCH: 4 CREAM TOPICAL at 05:42

## 2020-11-10 RX ADMIN — Medication 650 MILLIGRAM(S): at 05:47

## 2020-11-10 RX ADMIN — AMLODIPINE BESYLATE 10 MILLIGRAM(S): 2.5 TABLET ORAL at 05:47

## 2020-11-10 RX ADMIN — SODIUM CHLORIDE 1 GRAM(S): 9 INJECTION INTRAMUSCULAR; INTRAVENOUS; SUBCUTANEOUS at 05:47

## 2020-11-10 RX ADMIN — PANTOPRAZOLE SODIUM 40 MILLIGRAM(S): 20 TABLET, DELAYED RELEASE ORAL at 11:05

## 2020-11-10 RX ADMIN — Medication 83.33 MILLIMOLE(S): at 05:47

## 2020-11-10 RX ADMIN — NYSTATIN CREAM 1 APPLICATION(S): 100000 CREAM TOPICAL at 17:07

## 2020-11-10 RX ADMIN — POLYETHYLENE GLYCOL 3350 17 GRAM(S): 17 POWDER, FOR SOLUTION ORAL at 05:47

## 2020-11-10 RX ADMIN — Medication 650 MILLIGRAM(S): at 11:36

## 2020-11-10 RX ADMIN — LEVETIRACETAM 500 MILLIGRAM(S): 250 TABLET, FILM COATED ORAL at 05:47

## 2020-11-10 RX ADMIN — ENOXAPARIN SODIUM 40 MILLIGRAM(S): 100 INJECTION SUBCUTANEOUS at 11:06

## 2020-11-10 RX ADMIN — Medication 650 MILLIGRAM(S): at 11:06

## 2020-11-10 RX ADMIN — LIDOCAINE 1 PATCH: 4 CREAM TOPICAL at 20:07

## 2020-11-10 RX ADMIN — CHLORHEXIDINE GLUCONATE 1 APPLICATION(S): 213 SOLUTION TOPICAL at 05:48

## 2020-11-10 RX ADMIN — CHLORHEXIDINE GLUCONATE 15 MILLILITER(S): 213 SOLUTION TOPICAL at 17:07

## 2020-11-10 RX ADMIN — Medication 650 MILLIGRAM(S): at 06:20

## 2020-11-10 RX ADMIN — CHLORHEXIDINE GLUCONATE 15 MILLILITER(S): 213 SOLUTION TOPICAL at 05:47

## 2020-11-10 RX ADMIN — POLYETHYLENE GLYCOL 3350 17 GRAM(S): 17 POWDER, FOR SOLUTION ORAL at 17:07

## 2020-11-10 RX ADMIN — Medication 650 MILLIGRAM(S): at 17:37

## 2020-11-10 RX ADMIN — SODIUM CHLORIDE 1 GRAM(S): 9 INJECTION INTRAMUSCULAR; INTRAVENOUS; SUBCUTANEOUS at 13:08

## 2020-11-10 RX ADMIN — LEVETIRACETAM 500 MILLIGRAM(S): 250 TABLET, FILM COATED ORAL at 17:07

## 2020-11-10 NOTE — EEG REPORT - NS EEG TEXT BOX
[facility]   COMPREHENSIVE EPILEPSY CENTER   REPORT OF LONG-TERM VIDEO EEG     [facility]: [address], [place] [address], Ph#: [phone]  [facility]: [id number] [place] [address], Ph#: [phone]  [facility]: [address] [place] [address], Ph#: [phone]    Patient Name: BRITTANY MENDEZ  Age and : 80y (40)  MRN #: [id number]  Location: [place] [address]  Referring Physician: Allan Bosch    Start Time/Date: 08:00 on 11/10/2020  End Time/Date: 14:30 on 11/10/2020  Duration: 6.5h    _____________________________________________________________  STUDY INFORMATION    EEG Recording Technique:  The patient underwent continuous Video-EEG monitoring, using Telemetry System hardware on the XLTek Digital System. EEG and video data were stored on a computer hard drive with important events saved in digital archive files. The material was reviewed by a physician (electroencephalographer / epileptologist) on a daily basis. Daljit and seizure detection algorithms were utilized and reviewed. An EEG Technician attended to the patient, and was available throughout daytime work hours.  The epilepsy center neurologist was available in person or on call 24-hours per day.    EEG Placement and Labeling of Electrodes:  The EEG was performed utilizing 20 channel referential EEG connections (coronal over temporal over parasagittal montage) using all standard 10-20 electrode placements with EKG, with additional electrodes placed in the inferior temporal region using the modified 10-10 montage electrode placements for elective admissions, or if deemed necessary. Recording was at a sampling rate of 256 samples per second per channel. Time synchronized digital video recording was done simultaneously with EEG recording. A low light infrared camera was used for low light recording.     _____________________________________________________________  HISTORY    Patient is a 80y old  Female who presents with a chief complaint of s/p fall (2020 07:48)    PERTINENT MEDICATION:  levETIRAcetam  IVPB 500 milliGRAM(s) IV Intermittent every 12 hours    FINDINGS:  The background was continuous, spontaneously variable and reactive.  During wakefulness, the posteriorly dominant rhythm was poorly modulated near 7Hz over the right, attenuated over the left.     There was diffuse irregular theta and delta activity present, sharply contoured at times.  Amplitudes persistently lower over the left with relative attenuation.    Sleep Background:  Drowsiness was characterized by fragmentation, attenuation, and slowing of the background activity.    Sleep was characterized by the presence of rudimentary symmetric spindles, and K-complexes.    Epileptiform Activity:   Frequent runs of 0.5 to 1hz right periodic epileptiform discharges were present, higher amplitude and more well formed over the right max F4, but suppressed over the left.    Events:  No clinical events were recorded.  No seizures were recorded.    Activation Procedures:   -Hyperventilation was not performed.    -Photic stimulation was not performed.    Artifacts:  Intermittent myogenic and movement artifacts were noted.    ECG:  The heart rate on single channel ECG at baseline was predominantly near BPM 80-90  -----------------------------------------------------------------------------------------------------    EEG Classification / Summary:  Abnormal EEG study  Moderate background slowing, amplitudes reduced over the left.  Intermittent 0.5 to 1hz right periodic discharges, higher amplitudes over the right    -----------------------------------------------------------------------------------------------------    Clinical Impression:  Risk of focal onset seizures from the frontal regions.   Left hemispheric structural or functional abnormality  Moderate multifocal cerebral dysfunction, with fluid attenuation effect on the left.   No change vs prior day.  No seizures x 3 days    -------------------------------------------------------------------------------------------------------  [facility] EEG Reading Room Ph#: [phone]  Epilepsy Answering Service after 5PM and before 8:30AM: Ph#: [phone]    Iva Patel  Pediatric Neurology, [facility]   [facility]   COMPREHENSIVE EPILEPSY CENTER   REPORT OF LONG-TERM VIDEO EEG     [facility]: [address], [place] [address], Ph#: [phone]  [facility]: [id number] [place] [address], Ph#: [phone]  [facility]: [address] [place] [address], Ph#: [phone]    Patient Name: BRITTANY MENDEZ  Age and : 80y (40)  MRN #: [id number]  Location: [place] [address]  Referring Physician: Allan Bosch    Start Time/Date: 08:00 on 11/10/2020  End Time/Date: 14:30 on 11/10/2020  Duration: 6.5h    _____________________________________________________________  STUDY INFORMATION    EEG Recording Technique:  The patient underwent continuous Video-EEG monitoring, using Telemetry System hardware on the XLTek Digital System. EEG and video data were stored on a computer hard drive with important events saved in digital archive files. The material was reviewed by a physician (electroencephalographer / epileptologist) on a daily basis. Daljit and seizure detection algorithms were utilized and reviewed. An EEG Technician attended to the patient, and was available throughout daytime work hours.  The epilepsy center neurologist was available in person or on call 24-hours per day.    EEG Placement and Labeling of Electrodes:  The EEG was performed utilizing 20 channel referential EEG connections (coronal over temporal over parasagittal montage) using all standard 10-20 electrode placements with EKG, with additional electrodes placed in the inferior temporal region using the modified 10-10 montage electrode placements for elective admissions, or if deemed necessary. Recording was at a sampling rate of 256 samples per second per channel. Time synchronized digital video recording was done simultaneously with EEG recording. A low light infrared camera was used for low light recording.     _____________________________________________________________  HISTORY    Patient is a 80y old  Female who presents with a chief complaint of s/p fall (2020 07:48)    PERTINENT MEDICATION:  levETIRAcetam  IVPB 500 milliGRAM(s) IV Intermittent every 12 hours    FINDINGS:  The background was continuous, spontaneously variable and reactive.  During wakefulness, the posteriorly dominant rhythm was poorly modulated near 7Hz over the right, attenuated over the left.     There was diffuse irregular theta and delta activity present, sharply contoured at times.  Amplitudes persistently lower over the left with relative attenuation.    Sleep Background:  Drowsiness was characterized by fragmentation, attenuation, and slowing of the background activity.    Sleep was characterized by the presence of rudimentary symmetric spindles, and K-complexes.    Epileptiform Activity:   Frequent runs of 0.5 to 1hz right periodic epileptiform discharges were present, higher amplitude and more well formed over the right max F4, but suppressed over the left.    Events:  No clinical events were recorded.  No seizures were recorded.    Activation Procedures:   -Hyperventilation was not performed.    -Photic stimulation was not performed.    Artifacts:  Intermittent myogenic and movement artifacts were noted.    ECG:  The heart rate on single channel ECG at baseline was predominantly near BPM 80-90  -----------------------------------------------------------------------------------------------------    EEG Classification / Summary:  Abnormal EEG study  Moderate background slowing, amplitudes reduced over the left.  Intermittent 0.5 to 1hz right periodic discharges, higher amplitudes over the right.    -----------------------------------------------------------------------------------------------------    Clinical Impression:  Risk of focal onset seizures from the frontal regions.   Left hemispheric structural or functional abnormality  Moderate multifocal cerebral dysfunction, with fluid attenuation effect on the left.   No change vs prior day.  No seizures x 3 days    -------------------------------------------------------------------------------------------------------  [facility] EEG Reading Room Ph#: [phone]  Epilepsy Answering Service after 5PM and before 8:30AM: Ph#: [phone]    Iva Patel  Pediatric Neurology, [facility]

## 2020-11-10 NOTE — PROGRESS NOTE ADULT - ASSESSMENT
ASSESSMENT:  80F hx of dementia, hydrocephalus s/p  shunt and recent hip fracture non-operative management at SNF presents from SNF after a mechanical fall, level I trauma activated, primary survey significant for GCS 8, decision was made to intubate in trauma bay. Secondary survey significant for L forehead hematoma, R lower abdomen abrasion. CT scan significant for large L SDH with midline shift and L 8-10 rib fx. NSGY and family discussed, plan for non-operative management at this time. CT head 4/11 revealed decreased subdural hematoma and midline shift 11mm-> 7 mm. New low density cerebellar focus suspicious for infarcts.   As per neurosurgery note "GOC discussion pending, likely needs trach/PEG"    PLAN:  Neuro: s/p  shunt ligation at level of clavicle  - q4h neurochecks  - CT head 11/9 increased size of ventricles and hemorrhage layering in occipital horns, similar midline shift and subdural hemorrhage  - Plan for possible hazel hole (HD10) if patients neuro status continues to improve  - EEG with focal area of high seizure risk, keppra 500mg BID for seizure prophylaxis, f/u EEG final read today  - Off all sedation; PO tylenol, lidoderm  - f/u discussions with family and neurosurgery team    Resp: L 8-10 rib fx  - Intubated, CPAP 10/5  - Trach planning pending GOC discussions   - Daily CXR    CV: Goal SBP <200  - Home atorvastatin  - Metoprolol 50mg Q12  - Amlodipine 10mg daily  - Labetalol 10mg IV Q4 PRN    GI: no acute issues  - Tube feeds started 11/6 pivot @ 55  - Liver mass (+ renal mass) finding discussed with son   - Discussion of PEG pending GOC   - Protonix for stress ulcer prophylaxis  - miralax/senna    : page removed 11/9, passed TOV  - Monitor I/Os, UOP  - Salt tabs 1g q8hr for goal > 140, f/u daily BMP    Heme: acute anemia  - Lovenox VTE ppx started 11/6  - BLE duplex negative for DVT 11/6    ID: UTI s/p CTX course  - Monitor clinically for signs of active infection    Endo: no acute issues  - Monitor glucose on BMP    Dispo:  - SICU  - DNR

## 2020-11-10 NOTE — EEG REPORT - NS EEG TEXT BOX
[facility]   COMPREHENSIVE EPILEPSY CENTER   REPORT OF LONG-TERM VIDEO EEG     [facility]: [address], [place] [address], Ph#: [phone]  [facility]: [id number] [place] [address], Ph#: [phone]  [facility]: [address] [place] [address], Ph#: [phone]    Patient Name: BRITTANY MENDEZ  Age and : 80y (40)  MRN #: [id number]  Location: [place] [address]  Referring Physician: Allan Bosch    Start Time/Date: 08:00 on 2020  End Time/Date: 08:00 on 11/10/2020  Duration: 24h    _____________________________________________________________  STUDY INFORMATION    EEG Recording Technique:  The patient underwent continuous Video-EEG monitoring, using Telemetry System hardware on the XLTek Digital System. EEG and video data were stored on a computer hard drive with important events saved in digital archive files. The material was reviewed by a physician (electroencephalographer / epileptologist) on a daily basis. Daljit and seizure detection algorithms were utilized and reviewed. An EEG Technician attended to the patient, and was available throughout daytime work hours.  The epilepsy center neurologist was available in person or on call 24-hours per day.    EEG Placement and Labeling of Electrodes:  The EEG was performed utilizing 20 channel referential EEG connections (coronal over temporal over parasagittal montage) using all standard 10-20 electrode placements with EKG, with additional electrodes placed in the inferior temporal region using the modified 10-10 montage electrode placements for elective admissions, or if deemed necessary. Recording was at a sampling rate of 256 samples per second per channel. Time synchronized digital video recording was done simultaneously with EEG recording. A low light infrared camera was used for low light recording.     _____________________________________________________________  HISTORY    Patient is a 80y old  Female who presents with a chief complaint of s/p fall (2020 07:48)      PERTINENT MEDICATION:  levETIRAcetam  IVPB 500 milliGRAM(s) IV Intermittent every 12 hours    FINDINGS:  The background was continuous, spontaneously variable and reactive.  During wakefulness, the posteriorly dominant rhythm was poorly modulated near 7Hz over the right, attenuated over the left.     There was diffuse irregular theta and delta activity present, sharply contoured at times.  Amplitudes persistently lower over the left with relative attenuation.    Sleep Background:  Drowsiness was characterized by fragmentation, attenuation, and slowing of the background activity.    Sleep was characterized by the presence of rudimentary symmetric spindles, and K-complexes.    Epileptiform Activity:   Frequent runs of 0.5 to 1hz right periodic epileptiform discharges were present, higher amplitude and more well formed over the right max F4, but suppressed over the left.    Events:  No clinical events were recorded.  No seizures were recorded.    Activation Procedures:   -Hyperventilation was not performed.    -Photic stimulation was not performed.    Artifacts:  Intermittent myogenic and movement artifacts were noted.    ECG:  The heart rate on single channel ECG at baseline was predominantly near BPM 80-90  -----------------------------------------------------------------------------------------------------    EEG Classification / Summary:  Abnormal EEG study  Moderate background slowing, amplitudes reduced over the left.  Intermittent 0.5 to 1hz right periodic discharges, higher amplitudes over the right    -----------------------------------------------------------------------------------------------------    Clinical Impression:  Risk of focal onset seizures from the frontal regions.   Left hemispheric structural or functional abnormality  Moderate multifocal cerebral dysfunction, with fluid attenuation effect on the left.     -------------------------------------------------------------------------------------------------------  [facility] EEG Reading Room Ph#: [phone]  Epilepsy Answering Service after 5PM and before 8:30AM: Ph#: [phone]    Iva Patel  Pediatric Neurology, [facility] Epilepsy Center    This Preliminary report is based on fellow review. Final report pending attending review. [facility]   COMPREHENSIVE EPILEPSY CENTER   REPORT OF LONG-TERM VIDEO EEG     [facility]: [address], [place] [address], Ph#: [phone]  [facility]: [id number] [place] [address], Ph#: [phone]  [facility]: [address] [place] [address], Ph#: [phone]    Patient Name: BRITTANY MENDEZ  Age and : 80y (40)  MRN #: [id number]  Location: [place] [address]  Referring Physician: Allan Bosch    Start Time/Date: 08:00 on 2020  End Time/Date: 08:00 on 11/10/2020  Duration: 24h    _____________________________________________________________  STUDY INFORMATION    EEG Recording Technique:  The patient underwent continuous Video-EEG monitoring, using Telemetry System hardware on the XLTek Digital System. EEG and video data were stored on a computer hard drive with important events saved in digital archive files. The material was reviewed by a physician (electroencephalographer / epileptologist) on a daily basis. Daljit and seizure detection algorithms were utilized and reviewed. An EEG Technician attended to the patient, and was available throughout daytime work hours.  The epilepsy center neurologist was available in person or on call 24-hours per day.    EEG Placement and Labeling of Electrodes:  The EEG was performed utilizing 20 channel referential EEG connections (coronal over temporal over parasagittal montage) using all standard 10-20 electrode placements with EKG, with additional electrodes placed in the inferior temporal region using the modified 10-10 montage electrode placements for elective admissions, or if deemed necessary. Recording was at a sampling rate of 256 samples per second per channel. Time synchronized digital video recording was done simultaneously with EEG recording. A low light infrared camera was used for low light recording.     _____________________________________________________________  HISTORY    Patient is a 80y old  Female who presents with a chief complaint of s/p fall (2020 07:48)      PERTINENT MEDICATION:  levETIRAcetam  IVPB 500 milliGRAM(s) IV Intermittent every 12 hours    FINDINGS:  The background was continuous, spontaneously variable and reactive.  During wakefulness, the posteriorly dominant rhythm was poorly modulated near 7Hz over the right, attenuated over the left.     There was diffuse irregular theta and delta activity present, sharply contoured at times.  Amplitudes persistently lower over the left with relative attenuation.    Sleep Background:  Drowsiness was characterized by fragmentation, attenuation, and slowing of the background activity.    Sleep was characterized by the presence of rudimentary symmetric spindles, and K-complexes.    Epileptiform Activity:   Frequent runs of 0.5 to 1hz right periodic epileptiform discharges were present, higher amplitude and more well formed over the right max F4, but suppressed over the left.    Events:  No clinical events were recorded.  No seizures were recorded.    Activation Procedures:   -Hyperventilation was not performed.    -Photic stimulation was not performed.    Artifacts:  Intermittent myogenic and movement artifacts were noted.    ECG:  The heart rate on single channel ECG at baseline was predominantly near BPM 80-90  -----------------------------------------------------------------------------------------------------    EEG Classification / Summary:  Abnormal EEG study  Moderate background slowing, amplitudes reduced over the left.  Intermittent 0.5 to 1hz right periodic discharges, higher amplitudes over the right    -----------------------------------------------------------------------------------------------------    Clinical Impression:  Risk of focal onset seizures from the frontal regions.   Left hemispheric structural or functional abnormality  Moderate multifocal cerebral dysfunction, with fluid attenuation effect on the left.   No change vs prior day.  No seizures x 3 days    -------------------------------------------------------------------------------------------------------  [facility] EEG Reading Room Ph#: [phone]  Epilepsy Answering Service after 5PM and before 8:30AM: Ph#: [phone]    Iva Patel  Pediatric Neurology, [facility]

## 2020-11-10 NOTE — PROGRESS NOTE ADULT - SUBJECTIVE AND OBJECTIVE BOX
Neurology Progress Note    S: Patient seen and examined. No new events overnight. no change    Medication:  acetaminophen   Tablet .. 650 milliGRAM(s) Oral every 6 hours  amLODIPine   Tablet 10 milliGRAM(s) Oral daily  atorvastatin 40 milliGRAM(s) Oral at bedtime  chlorhexidine 0.12% Liquid 15 milliLiter(s) Oral Mucosa every 12 hours  chlorhexidine 2% Cloths 1 Application(s) Topical <User Schedule>  enoxaparin Injectable 40 milliGRAM(s) SubCutaneous daily  labetalol Injectable 10 milliGRAM(s) IV Push every 4 hours PRN  levETIRAcetam  IVPB 500 milliGRAM(s) IV Intermittent every 12 hours  lidocaine   Patch 1 Patch Transdermal every 24 hours  metoprolol tartrate 50 milliGRAM(s) Oral every 12 hours  nystatin Powder 1 Application(s) Topical two times a day  pantoprazole  Injectable 40 milliGRAM(s) IV Push daily  polyethylene glycol 3350 17 Gram(s) Oral two times a day  senna 2 Tablet(s) Oral at bedtime  sodium chloride 1 Gram(s) Oral every 8 hours      Vitals:  Vital Signs Last 24 Hrs  T(C): 37.3 (09 Nov 2020 11:00), Max: 37.7 (08 Nov 2020 23:00)  T(F): 99.1 (09 Nov 2020 11:00), Max: 99.9 (08 Nov 2020 23:00)  HR: 95 (09 Nov 2020 11:00) (78 - 104)  BP: 172/79 (09 Nov 2020 11:00) (148/69 - 215/112)  BP(mean): 114 (09 Nov 2020 11:00) (99 - 148)  RR: 23 (09 Nov 2020 11:00) (19 - 26)  SpO2: 98% (09 Nov 2020 11:00) (97% - 100%)       General Exam:   Constitutional: Appears stated age, intubated. c collar  Neurological (>12):  MS: Intubated, not following commands, no response to vocal stimulation. Grimaces to sternal rub.   Language: Intubated.   CNs: (R = 2mm, L = 2mm) Poorly reactive, right eye exotropia.  No noted blink to threat. Corneal reflex intact. No facial asymmetry b/l.   Motor: Increased tone in the LLE in extension. no tremors at this time.  Sensory: Withdrawal to noxious stimuli in all 4 extremities LLE>RLE. Reduced withdrawal in the LLE.   Coordination: unable to test  Gait: Deferred       I personally reviewed the below data/images/labs:      CBC Full  -  ( 09 Nov 2020 00:11 )  WBC Count : 11.38 K/uL  RBC Count : 3.24 M/uL  Hemoglobin : 9.0 g/dL  Hematocrit : 28.4 %  Platelet Count - Automated : 438 K/uL  Mean Cell Volume : 87.7 fl  Mean Cell Hemoglobin : 27.8 pg  Mean Cell Hemoglobin Concentration : 31.7 gm/dL  Auto Neutrophil # : x  Auto Lymphocyte # : x  Auto Monocyte # : x  Auto Eosinophil # : x  Auto Basophil # : x  Auto Neutrophil % : x  Auto Lymphocyte % : x  Auto Monocyte % : x  Auto Eosinophil % : x  Auto Basophil % : x    11-09    136  |  104  |  18  ----------------------------<  149<H>  3.9   |  24  |  0.42<L>    Ca    8.2<L>      09 Nov 2020 00:11  Phos  2.2     11-09  Mg     2.4     11-09        PT/INR - ( 09 Nov 2020 00:11 )   PT: 14.6 sec;   INR: 1.23 ratio         PTT - ( 09 Nov 2020 00:11 )  PTT:28.5 sec    < from: TTE with Doppler (w/Cont) (11.07.20 @ 10:30) >  OBSERVATIONS:  Mitral Valve: Mild mitral annular calcification. Mild  mitral regurgitation.  Aortic Root: The aortic root was not well seen.  Aortic Valve: The aortic valve is not visualized in short  axis imaging for anatomic evaluation.  There is no aortic stenosis. Peak transaortic valve  gradient equals 10 mm Hg, mean transaortic valve gradient  equals 5 mm Hg, aortic valve velocity time integral equals  30 cm. Mild aortic regurgitation.  Peak left ventricular  outflow tract gradient equals 3 mm Hg, mean gradient is  equal to 1 mm Hg, LVOT velocity time integral equals 17 cm.  Left Atrium: Mildly dilated left atrium.  LA volume index =  41 cc/m2.  Left Ventricle: Normal left ventricular systolic function.  No segmental wall motion abnormalities.  The LVEF= 55-60%.  Normal left ventricular size. Diastolic dysfunction with  elevated left atrial pressures.  Right Heart: Normal right atrial size.  The right atrial area= 14cm2, the right atrial volume=  35ml. Normal right ventricular size and systolic function.  Normal tricuspid valve. Minimal tricuspid regurgitation.  Pulmonic valve not well visualized.  Pericardium/PleuraThere is no pericardial effusion.  Hemodynamic: The estimated right atrial pressure is normal.  There is no shunt at the atrial level with agitated saline.  ------------------------------------------------------------------------  CONCLUSIONS:  1. There is no shunt at the atrial level with agitated  saline.  2. Diastolic dysfunction with elevated left atrial  pressures.  *** Limited echo windows, patient is intubated and supine.   Poor parasternal views.  No previous Echo exam.  ------------------------------------------------------------------------  PROCEDURE DESCRIPTION: Transthoracic echocardiogram with  2-D, M-Mode and complete spectral and color flow Doppler.  Patient was injected with 10 cc's of aerosolized saline.  Patient was injected with 10 cc's of aerosolized saline.  Verbal consent was obtained for injection of  Ultrasonic  Enhancing Agent following a discussion of risks and  benefits. Following intravenous injection of Ultrasonic  Enhancing Agent , harmonic imaging was performed.  ------------------------------------------------------------------------  ECHOCARDIOGRAPHIC EXAMINATION:  LEFT ATRIUM:  LA Volume Index: 41.00 cc/sqm  LA Volume: 74.6 cc  HR and BP:  HR: 90 bpm  BP: 191/86  BSA: 1.80  ------------------------------------------------------------------------  HEMODYNAMICS:  RA Pressure Estimate: 8  ------------------------------------------------------------------------  COLOR FLOW and SPECIAL DOPPLER:  LVOT:  LVOT Velocity: .8 M/sec  LVOT Peak Gradient Rest: 2 mm Hg  LVOT Mean Gradient Rest: 1mm Hg  ------------------------------------------------------------------------  DIASTOLIC FUNCTION:  DT:470 ms  E/A: 0.71  e' Septal: 5.0 cm/s  E/e' Septal: 14.2  e' Lateral: 4.0 cm/s  E/e' Lateral: 17.7  MV E wave: 0.7 m/s  MV A wave: 1.0 m/s  Septal a': 12.0 cm/s  Lateral a': 15.0 cm/s    < end of copied text >        EEG Classification / Summary:  Abnormal EEG study  Moderate background slowing, amplitudes reduced over the left.  Frequent runs of 1hz Left central max periodic epileptiform discharges were present.    -----------------------------------------------------------------------------------------------------    Clinical Impression:  High risk of focal onset seizures from the left paracentral region. Seizure prophylaxis recommended.  Moderate multifocal cerebral dysfunction, with fluid attenuation effect on the left.   Radiology (XR, CT, MR, U/S, TTE/JUNAID):    < from: CT Head No Cont (11.04.20 @ 13:13) >  FINDINGS:    Redemonstration of right parietal approach ventriculostomy catheter in unchanged position.    Acute left lateral convexity subdural hemorrhage measures 1.7 cm in greatest depth, decreased from 1.9 cm.    Acute right lateral convexity subdural hemorrhage measures 6 mm in greatest depth, similar to the prior examination.    Acute subdural hemorrhage in the interhemispheric fissure measures 9 mm in greatest thickness, previously measuring 11 mm    Acute subdural hemorrhage along the tentorial leaves is similar, measuring4 to 5 mm in greatest thickness on the left.    Rightward midline shift of 7 mm is decreased from 11 mm. Increased ventricular size, no large hydrocephalus. Basal cisterns are more well visualized on the current examination.    Similar nonspecific 6 mm focus of increased attenuation in the left corona radiata (2-22).    New foci of low density in the right inferior cerebellar hemisphere, suspicious for acute infarction. No CT evidence for hemorrhagic transformation.    Similar extensive white matter microvascular ischemic disease.    No displaced calvarial fracture. The visualized paranasal sinuses and mastoid air cells are clear.    IMPRESSION:  New foci of low density in the right inferior cerebellar hemisphere, suspicious for acute infarction. No CT evidence for hemorrhagic transformation.    Decreased size of left lateral convexity subdural hemorrhage. Similar size of right lateral convexity subdural hemorrhage.    Decreased size of interhemispheric subdural hemorrhage. Similar tentorial subdural hemorrhage.    Decreased rightward midline shift, currently 7 mm, previously 11 mm.    Increased ventricular size, no large hydrocephalus. Basal cisterns are more well visualized on the current examination.    < end of copied text >    < from: CT Head No Cont (11.03.20 @ 23:29) >  Bilateral supratentorial subdural hematomas are again identified. The subdural hematoma on the left side measures approximately 2.2 cm in widest diameter and on the right side measures approximately 0.5 cm in widest diameter. Acute subdural hematoma along the interhemispheric region is seen along the left side and measures approximately 0.9 cm widest diameter. Acute subdural hematomas along bilateral tentorial region are again seen as well. There is mass effect on left lateral ventricle. Left-to-right shift (1.1 cm) is again seen.    Right parietal shunt catheter is again seen. This catheter appears unchanged in position.    Evaluation of the osseous structures with the appropriate window appears normal    The visualized paranasal sinuses mastoid and middle ear regions appear clear.    IMPRESSION: No stiffing change when allowing for differences in technique.    < end of copied text >    EEG Classification / Summary:  Abnormal EEG study  Moderate background slowing, amplitudes reduced over the left.  Intermittent 0.5 to 1hz right periodic discharges, higher amplitudes over the right    -----------------------------------------------------------------------------------------------------    Clinical Impression:  Risk of focal onset seizures from the frontal regions.   Left hemispheric structural or functional abnormality  Moderate multifocal cerebral dysfunction, with fluid attenuation effect on the left.       11-10-20  Clinical Impression:  Risk of focal onset seizures from the frontal regions.   Left hemispheric structural or functional abnormality  Moderate multifocal cerebral dysfunction, with fluid attenuation effect on the left.   No change vs prior day.  No seizures x 3 days

## 2020-11-10 NOTE — PROGRESS NOTE ADULT - SUBJECTIVE AND OBJECTIVE BOX
HISTORY  80F hx of dementia, hydrocephalus s/p  shunt and recent hip fracture non-operative management at SNF presents from SNF after a mechanical fall, level I trauma activated, primary survey significant for GCS 8, decision was made to intubate in trauma bay. Secondary survey significant for L forehead hematoma, R lower abdomen abrasion. CT scan significant for large L SDH with midline shift and L 8-10 rib fx. NSGY and family discussed, plan for non-operative management at this time. Patient was transferred to SICU for vent management and hemodynamic monitoring.    NSGY ligated  shunt at bedside. Repeat CT head was stable. Patient was briefly started on a cardene gtt for hypertensive urgency, goal SBP < 180. UA+ started on meropenem. Plan per NSGY to continue conservative management with potential hazel hole in future. KO tube feeds started on 11/4. Metoprolol 25Q12 started, Labetalol Q4PRN for HTN. Due to pcn allergy meropenum given for UTI than switched to Cefepime then to Ceftriaxone.    24 HOUR EVENTS:  -CT head 11/9 increased size of ventricles and hemorrhage layering in occipital horns, similar midline shift and subdural hemorrhage  -q4h neurochecks  -placed on CPAP 10/5 overnight for increased work of breathing on 0/5  -had previously tolerated 0/5 during the day  -passed TOV  -added miralax/senna  -lowered hold parameters for metoprolol PO and amlodipine PO  -son called during the day requesting to speak with Dr. Johnson, neurosurgery team aware    SUBJECTIVE/ROS:  [ ] A ten-point review of systems was otherwise negative except as noted.  [x] Due to altered mental status/intubation, subjective information were not able to be obtained from the patient. History was obtained, to the extent possible, from review of the chart and collateral sources of information.      NEURO  RASS:     GCS:     CAM ICU:  Exam: withdraws lower extremities and RUE to painful stimuli  Meds: acetaminophen   Tablet .. 650 milliGRAM(s) Oral every 6 hours  levETIRAcetam 500 milliGRAM(s) Oral every 12 hours    [x] Adequacy of sedation and pain control has been assessed and adjusted      RESPIRATORY  RR: 21 (11-09-20 @ 23:00) (19 - 25)  SpO2: 99% (11-09-20 @ 23:00) (97% - 100%)  Wt(kg): --  Exam: CPAP  Mechanical Ventilation: Mode: CPAP with PS, RR (patient): 22, FiO2: 30, PEEP: 5, PS: 0, MAP: 6, PIP: 8  ABG - ( 09 Nov 2020 05:00 )  pH: 7.50  /  pCO2: 33    /  pO2: 140   / HCO3: 26    / Base Excess: 2.8   /  SaO2: 99      Lactate: x                [N/A] Extubation Readiness Assessed  Meds:       CARDIOVASCULAR  HR: 83 (11-09-20 @ 23:00) (71 - 104)  BP: 161/77 (11-09-20 @ 23:00) (147/87 - 211/104)  BP(mean): 111 (11-09-20 @ 23:00) (102 - 146)  ABP: --  ABP(mean): --  Wt(kg): --  CVP(cm H2O): --      Exam: regular rate and rhythm  Cardiac Rhythm: sinus  Perfusion     [x]Adequate   [ ]Inadequate  Mentation   [x]Normal       [ ]Reduced  Extremities  [x]Warm         [ ]Cool  Volume Status [ ]Hypervolemic [x]Euvolemic [ ]Hypovolemic  Meds: amLODIPine   Tablet 10 milliGRAM(s) Oral daily  labetalol Injectable 10 milliGRAM(s) IV Push every 4 hours PRN SBP > 180  metoprolol tartrate 50 milliGRAM(s) Oral every 12 hours        GI/NUTRITION  Exam: soft, nondistended  Diet: TF  Meds: pantoprazole  Injectable 40 milliGRAM(s) IV Push daily  polyethylene glycol 3350 17 Gram(s) Oral two times a day  senna Syrup 5 milliLiter(s) Oral at bedtime      GENITOURINARY  I&O's Detail    11-08 @ 07:01  -  11-09 @ 07:00  --------------------------------------------------------  IN:    Enteral Tube Flush: 20 mL    IV PiggyBack: 1150 mL    IV PiggyBack: 300 mL    IV PiggyBack: 150 mL    Pivot 1.5: 1320 mL  Total IN: 2940 mL    OUT:    Indwelling Catheter - Urethral (mL): 2980 mL  Total OUT: 2980 mL    Total NET: -40 mL      11-09 @ 07:01  -  11-10 @ 00:19  --------------------------------------------------------  IN:    Pivot 1.5: 935 mL  Total IN: 935 mL    OUT:    Incontinent per Collection Bag (mL): 500 mL    Indwelling Catheter - Urethral (mL): 325 mL  Total OUT: 825 mL    Total NET: 110 mL          11-09    136  |  104  |  18  ----------------------------<  149<H>  3.9   |  24  |  0.42<L>    Ca    8.2<L>      09 Nov 2020 00:11  Phos  2.2     11-09  Mg     2.4     11-09      [ ] Carrasco catheter, indication: N/A  Meds: sodium chloride 1 Gram(s) Oral every 8 hours        HEMATOLOGIC  Meds: enoxaparin Injectable 40 milliGRAM(s) SubCutaneous daily    [x] VTE Prophylaxis                        9.0    11.38 )-----------( 438      ( 09 Nov 2020 00:11 )             28.4     PT/INR - ( 09 Nov 2020 00:11 )   PT: 14.6 sec;   INR: 1.23 ratio         PTT - ( 09 Nov 2020 00:11 )  PTT:28.5 sec  Transfusion     [ ] PRBC   [ ] Platelets   [ ] FFP   [ ] Cryoprecipitate      INFECTIOUS DISEASES    RECENT CULTURES:    Meds:       ENDOCRINE  CAPILLARY BLOOD GLUCOSE        Meds: atorvastatin 40 milliGRAM(s) Oral at bedtime        ACCESS DEVICES:  [x] Peripheral IV  [ ] Central Venous Line	[ ] R	[ ] L	[ ] IJ	[ ] Fem	[ ] SC	Placed:   [ ] Arterial Line		[ ] R	[ ] L	[ ] Fem	[ ] Rad	[ ] Ax	Placed:   [ ] PICC:					[ ] Mediport  [ ] Urinary Catheter, Date Placed:   [x] Necessity of urinary, arterial, and venous catheters discussed    OTHER MEDICATIONS:  chlorhexidine 0.12% Liquid 15 milliLiter(s) Oral Mucosa every 12 hours  chlorhexidine 2% Cloths 1 Application(s) Topical <User Schedule>  lidocaine   Patch 1 Patch Transdermal every 24 hours  nystatin Powder 1 Application(s) Topical two times a day      CODE STATUS:  Yes        IMAGING:    EXAM:  CT BRAIN                            PROCEDURE DATE:  11/09/2020        INTERPRETATION:  Noncontrast CT of the brain.    CLINICAL INDICATION:  L SDH, follow-up    TECHNIQUE : Axial CT scanning of the brain was obtained from the skull base to the vertex without the administration of intravenous contrast. Sagittal and coronal reformats were provided.    COMPARISON: CT brain 11/4/2020    FINDINGS:    Redemonstration of right parietal approach ventriculostomy catheter in unchanged position.    Similar left lateral convexity acute subdural collection, currently measuring 1.8 cm in greatest depth, previously measuring 1.9 cm.    Similar hemorrhage in the interhemispheric fissure measuring up to 0.7 cm in greatest depth. Similar thin subdural hemorrhage layering along the left tentorium.    Similar right temporal subdural hemorrhage measuring 0.5 cm in greatest depth.    Increased small hemorrhage layering in the occipital horns.    Similar rightward midline shift of 0.9 cm. No effacement of basal cisterns. Ventricles increased in size and mildly dilated.    No parenchymal hemorrhage or brain edema.    IMPRESSION:    Ventricles increased in size and mildly dilated. Increased small hemorrhage layering in the occipital horns.  Similar rightward midline shift of 0.9 cm. No effacement of basal cisterns.  Similar multifocal subdural hemorrhage.  No parenchymal hemorrhage or brain edema.    Dr. Dinh discussed these findings with Dr. Aguilar on 11/9/2020 9:17 AM with read back.      TABBY DINH MD; Attending Radiologist  This document has been electronically signed. Nov 9 2020  9:20AM

## 2020-11-10 NOTE — PROGRESS NOTE ADULT - ASSESSMENT
80 year old female, with a medical history significant for dementia, not on A/C, BIBEMS unresponsive with head injury, reportedly fell, intubated in the ED to protect airway (GS=8), imaging studies showed left acute SDH, repeat CT after  shunt ligation shows stable SDH and left to right shift. CT head 4/11 revealed decreased subdural hematoma and midline shift 11mm-> 7 mm. New low density cerebellar focus suspicious for infarcts.       Plan:   - Pain control   - c/w intubation and mechanical ventilation  - Appreciate neurosurgery:  Conservative management.   - f/u EEG   - NPO   - Follow up with family meeting discussing GOC  - Appreciate SICU care    ACS  [id number]

## 2020-11-10 NOTE — CHART NOTE - NSCHARTNOTEFT_GEN_A_CORE
Latasha Harvey seen and examined today. Per primary team son is awaiting update with Dr. Johnson today. Social work to reach out to him to discuss life and placement options after tracheostomy, should they pursue this route. Per primary team patient doing well on CPAP from pulmonary standpoint, however no improvement in mental status, and this is barring medical extubation. Son Terence feels that his mom is likely brain damaged and that "things don't look good". He wants to respect her wishes because he knows she "does not want to live like a vegetable". After her  , she went into depression and was shortly after diagnosed with frontotemporal dementia (in addition to the NPH, for which she already had a  shunt). She has been declining in ADL's for several years. She was making improvements more recently at rehab, until she sustained the fall leading to this admission.     He is the HCP but is making decisions jointly with his sister who is in agreement. Of note they are frustrated with ICU communication and would appreciate more regular updates if this is possible.     We will sign off. Please re-consult whenever needed. Latasha Harvey seen and examined today. Per primary team son is awaiting update with Dr. Johnson today. Social work to reach out to him to discuss life and placement options after tracheostomy, should they pursue this route. Per primary team patient doing well on CPAP from pulmonary standpoint, however no improvement in mental status, and this is barring medical extubation. Son Terence feels that his mom is likely brain damaged and that "things don't look good". He wants to respect her wishes because he knows she "does not want to live like a vegetable". After her  , she went into depression and was shortly after diagnosed with frontotemporal dementia (in addition to the NPH, for which she already had a  shunt). She has been declining in ADL's for several years. She was making improvements more recently at rehab, until she sustained the fall leading to this admission.     He is the HCP but is making decisions jointly with his sister who is in agreement. Plan is to wait until speaking with Dr. Johnson again this week ( or Thurs) and then to make a decision about extubation by Friday. We will refer to chaplaincy as well for  to visit patient and family as well.     Of note they would appreciate more regular updates from ICU team if this is possible.     We will sign off for now, however please re-consult whenever needed.

## 2020-11-10 NOTE — PROGRESS NOTE ADULT - ATTENDING COMMENTS
Patient seen and examined on AM SICU rounds  Remains with poor mental status  Intermittently localizing pain   On NO sedation  Hemodynamically stable  Oxygenating well on invasive pressure support ventilation (FiO2 30%, PEEP 5, Pressure support of 10)  Hematocrit stable    - Given very poor mental status, now one week post injury, and preexisting neurological comorbidities, a significant neurological recovery is very unlikely.  - Palliative care working with family  - Inability to protect airway, secondary to severe TBI, precludes extubation

## 2020-11-10 NOTE — PROGRESS NOTE ADULT - ASSESSMENT
79 y/o  woman with dementia, hydrocephalus s/p VPS admitted after fall at nursing facility with LOC and unresponsiveness.  Initial CTH with acute SDH L>R and on interhemispheric fissure and tentorial leaves with rightward shift 11mm decreased to 7mm. Repeat CTH with new R inferior cerebellar hypodensity concerning for infarct. Neuro exam without appreciable interval change: intubated, C-collar, no verbal output, not following commands, movement of extremities L>R w/ noxious stimuli. EEG today with L slowing and R periodic discharges. no seizures x 3 days       Recommendations:  [] c/w Keppra 500mg BID indefinitely for now given EEG results  []  SBP goal <160  [x] Hold antiplatelets given SDH; consider when cleared from nsx standpoint.  [x] Lipitor 80mg can be reduced to 40mg  [x] LDL 84, HbA1C 5.6  [x] Telemetry monitoring  [x] SBP goal normotensive, less than 160/90.   [x]VEEG reviewed   [x]TTE reviewed  [ ] MRI/MRA when able if in agreement with GOC of family  [x] LE dopplers: no DVT   [ ] GOC discussion pending, likely needs trach/PEG  [] spoke with team

## 2020-11-11 LAB
ANION GAP SERPL CALC-SCNC: 12 MMOL/L — SIGNIFICANT CHANGE UP (ref 5–17)
APTT BLD: 24.3 SEC — LOW (ref 27.5–35.5)
BUN SERPL-MCNC: 30 MG/DL — HIGH (ref 7–23)
CALCIUM SERPL-MCNC: 9.2 MG/DL — SIGNIFICANT CHANGE UP (ref 8.4–10.5)
CHLORIDE SERPL-SCNC: 106 MMOL/L — SIGNIFICANT CHANGE UP (ref 96–108)
CO2 SERPL-SCNC: 24 MMOL/L — SIGNIFICANT CHANGE UP (ref 22–31)
CREAT SERPL-MCNC: 0.48 MG/DL — LOW (ref 0.5–1.3)
GAS PNL BLDA: SIGNIFICANT CHANGE UP
GLUCOSE SERPL-MCNC: 116 MG/DL — HIGH (ref 70–99)
HCT VFR BLD CALC: 29.4 % — LOW (ref 34.5–45)
HGB BLD-MCNC: 9.1 G/DL — LOW (ref 11.5–15.5)
INR BLD: 1.21 RATIO — HIGH (ref 0.88–1.16)
MAGNESIUM SERPL-MCNC: 2.8 MG/DL — HIGH (ref 1.6–2.6)
MCHC RBC-ENTMCNC: 27.4 PG — SIGNIFICANT CHANGE UP (ref 27–34)
MCHC RBC-ENTMCNC: 31 GM/DL — LOW (ref 32–36)
MCV RBC AUTO: 88.6 FL — SIGNIFICANT CHANGE UP (ref 80–100)
NRBC # BLD: 0 /100 WBCS — SIGNIFICANT CHANGE UP (ref 0–0)
PHOSPHATE SERPL-MCNC: 2.9 MG/DL — SIGNIFICANT CHANGE UP (ref 2.5–4.5)
PLATELET # BLD AUTO: 489 K/UL — HIGH (ref 150–400)
POTASSIUM SERPL-MCNC: 4.6 MMOL/L — SIGNIFICANT CHANGE UP (ref 3.5–5.3)
POTASSIUM SERPL-SCNC: 4.6 MMOL/L — SIGNIFICANT CHANGE UP (ref 3.5–5.3)
PROTHROM AB SERPL-ACNC: 14.4 SEC — HIGH (ref 10.6–13.6)
RBC # BLD: 3.32 M/UL — LOW (ref 3.8–5.2)
RBC # FLD: 15.3 % — HIGH (ref 10.3–14.5)
SODIUM SERPL-SCNC: 142 MMOL/L — SIGNIFICANT CHANGE UP (ref 135–145)
WBC # BLD: 12.2 K/UL — HIGH (ref 3.8–10.5)
WBC # FLD AUTO: 12.2 K/UL — HIGH (ref 3.8–10.5)

## 2020-11-11 PROCEDURE — 99232 SBSQ HOSP IP/OBS MODERATE 35: CPT

## 2020-11-11 PROCEDURE — 71045 X-RAY EXAM CHEST 1 VIEW: CPT | Mod: 26

## 2020-11-11 RX ORDER — LEVETIRACETAM 250 MG/1
500 TABLET, FILM COATED ORAL
Refills: 0 | Status: DISCONTINUED | OUTPATIENT
Start: 2020-11-12 | End: 2020-11-18

## 2020-11-11 RX ADMIN — Medication 250 MILLIMOLE(S): at 08:19

## 2020-11-11 RX ADMIN — Medication 650 MILLIGRAM(S): at 00:00

## 2020-11-11 RX ADMIN — LEVETIRACETAM 500 MILLIGRAM(S): 250 TABLET, FILM COATED ORAL at 05:13

## 2020-11-11 RX ADMIN — Medication 650 MILLIGRAM(S): at 12:00

## 2020-11-11 RX ADMIN — ENOXAPARIN SODIUM 40 MILLIGRAM(S): 100 INJECTION SUBCUTANEOUS at 12:07

## 2020-11-11 RX ADMIN — LIDOCAINE 1 PATCH: 4 CREAM TOPICAL at 04:23

## 2020-11-11 RX ADMIN — LIDOCAINE 1 PATCH: 4 CREAM TOPICAL at 17:49

## 2020-11-11 RX ADMIN — NYSTATIN CREAM 1 APPLICATION(S): 100000 CREAM TOPICAL at 17:49

## 2020-11-11 RX ADMIN — LACTULOSE 20 GRAM(S): 10 SOLUTION ORAL at 21:25

## 2020-11-11 RX ADMIN — Medication 650 MILLIGRAM(S): at 18:30

## 2020-11-11 RX ADMIN — Medication 650 MILLIGRAM(S): at 17:49

## 2020-11-11 RX ADMIN — LEVETIRACETAM 500 MILLIGRAM(S): 250 TABLET, FILM COATED ORAL at 17:42

## 2020-11-11 RX ADMIN — Medication 650 MILLIGRAM(S): at 06:27

## 2020-11-11 RX ADMIN — CHLORHEXIDINE GLUCONATE 15 MILLILITER(S): 213 SOLUTION TOPICAL at 05:11

## 2020-11-11 RX ADMIN — POLYETHYLENE GLYCOL 3350 17 GRAM(S): 17 POWDER, FOR SOLUTION ORAL at 17:48

## 2020-11-11 RX ADMIN — SODIUM CHLORIDE 1 GRAM(S): 9 INJECTION INTRAMUSCULAR; INTRAVENOUS; SUBCUTANEOUS at 05:13

## 2020-11-11 RX ADMIN — SODIUM CHLORIDE 1 GRAM(S): 9 INJECTION INTRAMUSCULAR; INTRAVENOUS; SUBCUTANEOUS at 21:25

## 2020-11-11 RX ADMIN — ATORVASTATIN CALCIUM 40 MILLIGRAM(S): 80 TABLET, FILM COATED ORAL at 21:25

## 2020-11-11 RX ADMIN — LACTULOSE 20 GRAM(S): 10 SOLUTION ORAL at 05:11

## 2020-11-11 RX ADMIN — Medication 50 MILLIGRAM(S): at 17:42

## 2020-11-11 RX ADMIN — SODIUM CHLORIDE 1 GRAM(S): 9 INJECTION INTRAMUSCULAR; INTRAVENOUS; SUBCUTANEOUS at 05:39

## 2020-11-11 RX ADMIN — CHLORHEXIDINE GLUCONATE 1 APPLICATION(S): 213 SOLUTION TOPICAL at 05:38

## 2020-11-11 RX ADMIN — LIDOCAINE 1 PATCH: 4 CREAM TOPICAL at 19:29

## 2020-11-11 RX ADMIN — PANTOPRAZOLE SODIUM 40 MILLIGRAM(S): 20 TABLET, DELAYED RELEASE ORAL at 12:07

## 2020-11-11 RX ADMIN — POLYETHYLENE GLYCOL 3350 17 GRAM(S): 17 POWDER, FOR SOLUTION ORAL at 05:12

## 2020-11-11 RX ADMIN — Medication 650 MILLIGRAM(S): at 12:40

## 2020-11-11 RX ADMIN — NYSTATIN CREAM 1 APPLICATION(S): 100000 CREAM TOPICAL at 05:38

## 2020-11-11 RX ADMIN — Medication 50 MILLIGRAM(S): at 05:12

## 2020-11-11 RX ADMIN — SENNA PLUS 10 MILLILITER(S): 8.6 TABLET ORAL at 21:25

## 2020-11-11 RX ADMIN — ATORVASTATIN CALCIUM 40 MILLIGRAM(S): 80 TABLET, FILM COATED ORAL at 05:12

## 2020-11-11 RX ADMIN — AMLODIPINE BESYLATE 10 MILLIGRAM(S): 2.5 TABLET ORAL at 05:12

## 2020-11-11 RX ADMIN — Medication 650 MILLIGRAM(S): at 01:00

## 2020-11-11 RX ADMIN — CHLORHEXIDINE GLUCONATE 15 MILLILITER(S): 213 SOLUTION TOPICAL at 17:42

## 2020-11-11 RX ADMIN — SODIUM CHLORIDE 1 GRAM(S): 9 INJECTION INTRAMUSCULAR; INTRAVENOUS; SUBCUTANEOUS at 13:56

## 2020-11-11 RX ADMIN — Medication 650 MILLIGRAM(S): at 05:38

## 2020-11-11 RX ADMIN — LACTULOSE 20 GRAM(S): 10 SOLUTION ORAL at 13:56

## 2020-11-11 NOTE — PROGRESS NOTE ADULT - SUBJECTIVE AND OBJECTIVE BOX
Neurology Progress Note    S: Patient seen and examined. No new events overnight. no significant change    Medication:  acetaminophen   Tablet .. 650 milliGRAM(s) Oral every 6 hours  amLODIPine   Tablet 10 milliGRAM(s) Oral daily  atorvastatin 40 milliGRAM(s) Oral at bedtime  chlorhexidine 0.12% Liquid 15 milliLiter(s) Oral Mucosa every 12 hours  chlorhexidine 2% Cloths 1 Application(s) Topical <User Schedule>  enoxaparin Injectable 40 milliGRAM(s) SubCutaneous daily  labetalol Injectable 10 milliGRAM(s) IV Push every 4 hours PRN  levETIRAcetam  IVPB 500 milliGRAM(s) IV Intermittent every 12 hours  lidocaine   Patch 1 Patch Transdermal every 24 hours  metoprolol tartrate 50 milliGRAM(s) Oral every 12 hours  nystatin Powder 1 Application(s) Topical two times a day  pantoprazole  Injectable 40 milliGRAM(s) IV Push daily  polyethylene glycol 3350 17 Gram(s) Oral two times a day  senna 2 Tablet(s) Oral at bedtime  sodium chloride 1 Gram(s) Oral every 8 hours      Vitals:  Vital Signs Last 24 Hrs  T(C): 37.3 (09 Nov 2020 11:00), Max: 37.7 (08 Nov 2020 23:00)  T(F): 99.1 (09 Nov 2020 11:00), Max: 99.9 (08 Nov 2020 23:00)  HR: 95 (09 Nov 2020 11:00) (78 - 104)  BP: 172/79 (09 Nov 2020 11:00) (148/69 - 215/112)  BP(mean): 114 (09 Nov 2020 11:00) (99 - 148)  RR: 23 (09 Nov 2020 11:00) (19 - 26)  SpO2: 98% (09 Nov 2020 11:00) (97% - 100%)       General Exam:   Constitutional: Appears stated age, intubated. c collar  Neurological (>12):  MS: Intubated, not following commands, no response to vocal stimulation. Grimaces to sternal rub.   Language: Intubated.   CNs: (R = 2mm, L = 2mm) Poorly reactive, right eye exotropia.  No noted blink to threat. Corneal reflex intact. No facial asymmetry b/l.   Motor: Increased tone in the LLE in extension. no tremors at this time.  Sensory: Withdrawal to noxious stimuli in all 4 extremities LLE>RLE. Reduced withdrawal in the LLE.   Coordination: unable to test  Gait: Deferred       I personally reviewed the below data/images/labs:      CBC Full  -  ( 09 Nov 2020 00:11 )  WBC Count : 11.38 K/uL  RBC Count : 3.24 M/uL  Hemoglobin : 9.0 g/dL  Hematocrit : 28.4 %  Platelet Count - Automated : 438 K/uL  Mean Cell Volume : 87.7 fl  Mean Cell Hemoglobin : 27.8 pg  Mean Cell Hemoglobin Concentration : 31.7 gm/dL  Auto Neutrophil # : x  Auto Lymphocyte # : x  Auto Monocyte # : x  Auto Eosinophil # : x  Auto Basophil # : x  Auto Neutrophil % : x  Auto Lymphocyte % : x  Auto Monocyte % : x  Auto Eosinophil % : x  Auto Basophil % : x    11-09    136  |  104  |  18  ----------------------------<  149<H>  3.9   |  24  |  0.42<L>    Ca    8.2<L>      09 Nov 2020 00:11  Phos  2.2     11-09  Mg     2.4     11-09        PT/INR - ( 09 Nov 2020 00:11 )   PT: 14.6 sec;   INR: 1.23 ratio         PTT - ( 09 Nov 2020 00:11 )  PTT:28.5 sec    < from: TTE with Doppler (w/Cont) (11.07.20 @ 10:30) >  OBSERVATIONS:  Mitral Valve: Mild mitral annular calcification. Mild  mitral regurgitation.  Aortic Root: The aortic root was not well seen.  Aortic Valve: The aortic valve is not visualized in short  axis imaging for anatomic evaluation.  There is no aortic stenosis. Peak transaortic valve  gradient equals 10 mm Hg, mean transaortic valve gradient  equals 5 mm Hg, aortic valve velocity time integral equals  30 cm. Mild aortic regurgitation.  Peak left ventricular  outflow tract gradient equals 3 mm Hg, mean gradient is  equal to 1 mm Hg, LVOT velocity time integral equals 17 cm.  Left Atrium: Mildly dilated left atrium.  LA volume index =  41 cc/m2.  Left Ventricle: Normal left ventricular systolic function.  No segmental wall motion abnormalities.  The LVEF= 55-60%.  Normal left ventricular size. Diastolic dysfunction with  elevated left atrial pressures.  Right Heart: Normal right atrial size.  The right atrial area= 14cm2, the right atrial volume=  35ml. Normal right ventricular size and systolic function.  Normal tricuspid valve. Minimal tricuspid regurgitation.  Pulmonic valve not well visualized.  Pericardium/PleuraThere is no pericardial effusion.  Hemodynamic: The estimated right atrial pressure is normal.  There is no shunt at the atrial level with agitated saline.  ------------------------------------------------------------------------  CONCLUSIONS:  1. There is no shunt at the atrial level with agitated  saline.  2. Diastolic dysfunction with elevated left atrial  pressures.  *** Limited echo windows, patient is intubated and supine.   Poor parasternal views.  No previous Echo exam.  ------------------------------------------------------------------------  PROCEDURE DESCRIPTION: Transthoracic echocardiogram with  2-D, M-Mode and complete spectral and color flow Doppler.  Patient was injected with 10 cc's of aerosolized saline.  Patient was injected with 10 cc's of aerosolized saline.  Verbal consent was obtained for injection of  Ultrasonic  Enhancing Agent following a discussion of risks and  benefits. Following intravenous injection of Ultrasonic  Enhancing Agent , harmonic imaging was performed.  ------------------------------------------------------------------------  ECHOCARDIOGRAPHIC EXAMINATION:  LEFT ATRIUM:  LA Volume Index: 41.00 cc/sqm  LA Volume: 74.6 cc  HR and BP:  HR: 90 bpm  BP: 191/86  BSA: 1.80  ------------------------------------------------------------------------  HEMODYNAMICS:  RA Pressure Estimate: 8  ------------------------------------------------------------------------  COLOR FLOW and SPECIAL DOPPLER:  LVOT:  LVOT Velocity: .8 M/sec  LVOT Peak Gradient Rest: 2 mm Hg  LVOT Mean Gradient Rest: 1mm Hg  ------------------------------------------------------------------------  DIASTOLIC FUNCTION:  DT:470 ms  E/A: 0.71  e' Septal: 5.0 cm/s  E/e' Septal: 14.2  e' Lateral: 4.0 cm/s  E/e' Lateral: 17.7  MV E wave: 0.7 m/s  MV A wave: 1.0 m/s  Septal a': 12.0 cm/s  Lateral a': 15.0 cm/s    < end of copied text >        EEG Classification / Summary:  Abnormal EEG study  Moderate background slowing, amplitudes reduced over the left.  Frequent runs of 1hz Left central max periodic epileptiform discharges were present.    -----------------------------------------------------------------------------------------------------    Clinical Impression:  High risk of focal onset seizures from the left paracentral region. Seizure prophylaxis recommended.  Moderate multifocal cerebral dysfunction, with fluid attenuation effect on the left.   Radiology (XR, CT, MR, U/S, TTE/JUNAID):    < from: CT Head No Cont (11.04.20 @ 13:13) >  FINDINGS:    Redemonstration of right parietal approach ventriculostomy catheter in unchanged position.    Acute left lateral convexity subdural hemorrhage measures 1.7 cm in greatest depth, decreased from 1.9 cm.    Acute right lateral convexity subdural hemorrhage measures 6 mm in greatest depth, similar to the prior examination.    Acute subdural hemorrhage in the interhemispheric fissure measures 9 mm in greatest thickness, previously measuring 11 mm    Acute subdural hemorrhage along the tentorial leaves is similar, measuring4 to 5 mm in greatest thickness on the left.    Rightward midline shift of 7 mm is decreased from 11 mm. Increased ventricular size, no large hydrocephalus. Basal cisterns are more well visualized on the current examination.    Similar nonspecific 6 mm focus of increased attenuation in the left corona radiata (2-22).    New foci of low density in the right inferior cerebellar hemisphere, suspicious for acute infarction. No CT evidence for hemorrhagic transformation.    Similar extensive white matter microvascular ischemic disease.    No displaced calvarial fracture. The visualized paranasal sinuses and mastoid air cells are clear.    IMPRESSION:  New foci of low density in the right inferior cerebellar hemisphere, suspicious for acute infarction. No CT evidence for hemorrhagic transformation.    Decreased size of left lateral convexity subdural hemorrhage. Similar size of right lateral convexity subdural hemorrhage.    Decreased size of interhemispheric subdural hemorrhage. Similar tentorial subdural hemorrhage.    Decreased rightward midline shift, currently 7 mm, previously 11 mm.    Increased ventricular size, no large hydrocephalus. Basal cisterns are more well visualized on the current examination.    < end of copied text >    < from: CT Head No Cont (11.03.20 @ 23:29) >  Bilateral supratentorial subdural hematomas are again identified. The subdural hematoma on the left side measures approximately 2.2 cm in widest diameter and on the right side measures approximately 0.5 cm in widest diameter. Acute subdural hematoma along the interhemispheric region is seen along the left side and measures approximately 0.9 cm widest diameter. Acute subdural hematomas along bilateral tentorial region are again seen as well. There is mass effect on left lateral ventricle. Left-to-right shift (1.1 cm) is again seen.    Right parietal shunt catheter is again seen. This catheter appears unchanged in position.    Evaluation of the osseous structures with the appropriate window appears normal    The visualized paranasal sinuses mastoid and middle ear regions appear clear.    IMPRESSION: No stiffing change when allowing for differences in technique.    < end of copied text >    EEG Classification / Summary:  Abnormal EEG study  Moderate background slowing, amplitudes reduced over the left.  Intermittent 0.5 to 1hz right periodic discharges, higher amplitudes over the right    -----------------------------------------------------------------------------------------------------    Clinical Impression:  Risk of focal onset seizures from the frontal regions.   Left hemispheric structural or functional abnormality  Moderate multifocal cerebral dysfunction, with fluid attenuation effect on the left.       11-10-20  Clinical Impression:  Risk of focal onset seizures from the frontal regions.   Left hemispheric structural or functional abnormality  Moderate multifocal cerebral dysfunction, with fluid attenuation effect on the left.   No change vs prior day.  No seizures x 3 days

## 2020-11-11 NOTE — PROGRESS NOTE ADULT - ASSESSMENT
80F hx of dementia, hydrocephalus s/p  shunt and recent hip fracture non-operative management at SNF presents from SNF after a mechanical fall, level I trauma activated, primary survey significant for GCS 8, decision was made to intubate in trauma bay. Secondary survey significant for L forehead hematoma, R lower abdomen abrasion. CT scan significant for large L SDH with midline shift and L 8-10 rib fx. NSGY and family discussed, plan for non-operative management at this time. CT head 4/11 revealed decreased subdural hematoma and midline shift 11mm-> 7 mm. New low density cerebellar focus suspicious for infarcts.   As per neurosurgery note "GOC discussion pending, likely needs trach/PEG"    PLAN:  Neuro: s/p  shunt ligation at level of clavicle  - q4h neurochecks  - CT head 11/9 increased size of ventricles and hemorrhage layering in occipital horns, similar midline shift and subdural hemorrhage  - Plan for possible hazel hole (HD10) if patients neuro status continues to improve  - EEG with focal area of high seizure risk, keppra 500mg BID for seizure prophylaxis, f/u EEG final read today  - Off all sedation; PO tylenol, lidoderm  - f/u discussions with family and neurosurgery team    Resp: L 8-10 rib fx  - Intubated, CPAP 10/5  - Trach planning pending GOC discussions   - Daily CXR    CV: Goal SBP <200  - Home atorvastatin  - Metoprolol 50mg Q12  - Amlodipine 10mg daily  - Labetalol 10mg IV Q4 PRN    GI: no acute issues  - Tube feeds pivot @ 55  - Liver mass (+ renal mass) finding discussed with son   - Discussion of PEG pending GOC   - Protonix for stress ulcer prophylaxis  - miralax/senna    : page removed 11/9, passed TOV  - Monitor I/Os, UOP  - Salt tabs 1g q8hr for goal > 140, f/u daily BMP    Heme: acute anemia  - Lovenox VTE ppx started 11/6  - BLE duplex negative for DVT 11/6    ID: UTI s/p CTX course  - Monitor clinically for signs of active infection    Endo: no acute issues  - Monitor glucose on BMP    Dispo:  - SICU  - DNR

## 2020-11-11 NOTE — PROGRESS NOTE ADULT - ASSESSMENT
80F PMHx NPH s/p Shunt approx 10 years ago, recent hip Fx, found down at rehab today, CT head in ED shows acute left, interhemispheric, small R SDH.  shunt ligated at clavicle bedside in SICU, repeat CT after ligation shows stable SDH followed by interval improvement in heme and mls. ADM trauma/SICU  - plans pending further GOC w/ Dr. Johnson today/tomorrow, family to make a decision about extubation by friday per palliative  - repeat HCT stable heme, inc vents, fu plan per DW  - EEG - L slowing, R periodic discharges, no szx3 days on final review  - wbc 11<13<12, plt 478, INR 1.17  - still pending discussions re: trach/peg, still intubated, CPAPing  - completed abx for uti  - LED neg 11/6  - ok for dvt ppx per Dr. Johnson  - shunt ligated at clavicle 11/3, site c/d/i  - DNR  - now off all sedation  - per neurology keep Keppra 500 BID  - CT also c/f metastatic RCC

## 2020-11-11 NOTE — PROGRESS NOTE ADULT - ASSESSMENT
80 year old female, with a medical history significant for dementia, not on A/C, BIBEMS unresponsive with head injury, reportedly fell, intubated in the ED to protect airway (GS=8), imaging studies showed left acute SDH, repeat CT after  shunt ligation shows stable SDH and left to right shift. CT head 4/11 revealed decreased subdural hematoma and midline shift 11mm-> 7 mm. New low density cerebellar focus suspicious for infarcts.       Plan:   - Pain control   - c/w intubation and mechanical ventilation.   - Now off all sedation  - Recommend trach and PEG, pending discussion.  - Appreciate neurosurgery:  Conservative management.   - Follow up with family meeting discussing of [facility] friday.   - Appreciate SICU care  - DNR    ACS  [id number]

## 2020-11-11 NOTE — PROGRESS NOTE ADULT - ATTENDING COMMENTS
Patient seen and examined on SICU AM rounds  Still with very poor mental status  Off sedation -> does not follow commands, localizing pain, opening eyes only to pain, does not track with eyes  Hemodynamically stable  Oxygenating well on Pressure Support ventilation    - Prognosis poor  - Ongoing discussions regarding goals of care with family

## 2020-11-11 NOTE — PROGRESS NOTE ADULT - SUBJECTIVE AND OBJECTIVE BOX
SICU Daily Progress Note  =====================================================  Interval/Overnight Events:   - Added lactulose for lack of BMs    HPI: 80F hx of dementia, hydrocephalus s/p  shunt and recent hip fracture non-operative management at SNF presents from SNF after a mechanical fall, level I trauma activated, primary survey significant for GCS 8, decision was made to intubate in trauma bay. Secondary survey significant for L forehead hematoma, R lower abdomen abrasion. CT scan significant for large L SDH with midline shift and L 8-10 rib fx. NSGY and family discussed, plan for non-operative management at this time. Patient was transferred to SICU for vent management and hemodynamic monitoring.    NSGY ligated  shunt at bedside. Repeat CT head was stable. Patient was briefly started on a cardene gtt for hypertensive urgency, goal SBP < 180. UA+ started on meropenem. Plan per NSGY to continue conservative management with potential hazel hole in future. KO tube feeds started on 11/4. Metoprolol 25Q12 started, Labetalol Q4PRN for HTN. Due to pcn allergy meropenum given for UTI than switched to Cefepime then to Ceftriaxone.        MEDICATIONS:   --------------------------------------------------------------------------------------  Neurologic Medications  acetaminophen    Suspension .. 650 milliGRAM(s) Oral every 6 hours  levETIRAcetam 500 milliGRAM(s) Oral every 12 hours    Respiratory Medications    Cardiovascular Medications  amLODIPine   Tablet 10 milliGRAM(s) Oral daily  labetalol Injectable 10 milliGRAM(s) IV Push every 4 hours PRN SBP > 180  metoprolol tartrate 50 milliGRAM(s) Oral every 12 hours    Gastrointestinal Medications  lactulose Syrup 20 Gram(s) Enteral Tube three times a day  pantoprazole  Injectable 40 milliGRAM(s) IV Push daily  polyethylene glycol 3350 17 Gram(s) Oral two times a day  senna Syrup 10 milliLiter(s) Oral at bedtime  sodium chloride 1 Gram(s) Oral every 8 hours    Genitourinary Medications    Hematologic/Oncologic Medications  enoxaparin Injectable 40 milliGRAM(s) SubCutaneous daily    Antimicrobial/Immunologic Medications    Endocrine/Metabolic Medications  atorvastatin 40 milliGRAM(s) Oral at bedtime    Topical/Other Medications  chlorhexidine 0.12% Liquid 15 milliLiter(s) Oral Mucosa every 12 hours  chlorhexidine 2% Cloths 1 Application(s) Topical <User Schedule>  lidocaine   Patch 1 Patch Transdermal every 24 hours  nystatin Powder 1 Application(s) Topical two times a day    --------------------------------------------------------------------------------------    VITAL SIGNS, INS/OUTS (last 24 hours):  --------------------------------------------------------------------------------------                        9.2    11.55 )-----------( 478      ( 10 Nov 2020 04:35 )             29.6     11-10    139  |  105  |  24<H>  ----------------------------<  144<H>  4.4   |  25  |  0.45<L>    Ca    8.5      10 Nov 2020 02:27  Phos  2.3     11-10  Mg     2.6     11-10      PT/INR - ( 10 Nov 2020 02:27 )   PT: 13.9 sec;   INR: 1.17 ratio         PTT - ( 10 Nov 2020 02:27 )  PTT:32.2 sec    --------------------------------------------------------------------------------------  EXAM  NEUROLOGY  Exam: Normal, NAD, alert, oriented x3, no focal deficits    RESPIRATORY  Exam: Nonlabored, CTABL, no wheezes, ronchi, or rales. Normal respiratory effort.   Mechanical Ventilation: Mode: CPAP with PS, FiO2: 30, PEEP: 5, PS: 10, MAP: 9    CARDIOVASCULAR  Exam: Normotensive, RRR, no M/R/G     GI/NUTRITION  Exam: Abdomen soft, NT/ND, no rebound or guarding.  Current Diet:  NPO    VASCULAR  Exam: Extremities warm, well-perfused. Adequate capillary refill.     HEMATOLOGIC  [x] VTE Prophylaxis: enoxaparin Injectable 40 milliGRAM(s) SubCutaneous daily      INFECTIOUS DISEASE  Antimicrobials/Immunologic Medications:      Tubes/Lines/Drains  [x] Peripheral IV  [] Central Venous Line     	[] R	[] L	[] IJ	[] Fem	[] SC	Date Placed:   [] Arterial Line		[] R	[] L	[] Fem	[] Rad	[] Ax	Date Placed:   [] PICC		[] Midline		[] Mediport  [] Urinary Catheter		  [x] Necessity of urinary, arterial, and venous catheters discussed    LABS  --------------------------------------------------------------------------------------                        9.2    11.55 )-----------( 478      ( 10 Nov 2020 04:35 )             29.6     11-10    139  |  105  |  24<H>  ----------------------------<  144<H>  4.4   |  25  |  0.45<L>    Ca    8.5      10 Nov 2020 02:27  Phos  2.3     11-10  Mg     2.6     11-10      PT/INR - ( 10 Nov 2020 02:27 )   PT: 13.9 sec;   INR: 1.17 ratio         PTT - ( 10 Nov 2020 02:27 )  PTT:32.2 sec    --------------------------------------------------------------------------------------

## 2020-11-11 NOTE — PROGRESS NOTE ADULT - SUBJECTIVE AND OBJECTIVE BOX
Patient seen and examined at bedside.    --Anticoagulation--  enoxaparin Injectable 40 milliGRAM(s) SubCutaneous daily    T(C): 36.9 (11-11-20 @ 03:00), Max: 36.9 (11-11-20 @ 03:00)  HR: 101 (11-11-20 @ 05:00) (74 - 104)  BP: 171/79 (11-11-20 @ 04:00) (132/61 - 184/83)  RR: 13 (11-11-20 @ 05:00) (12 - 41)  SpO2: 99% (11-11-20 @ 05:00) (96% - 100%)  Wt(kg): --    Exam:  intubated, pupils 3R, LUE localizes, RUE w/d, RLE TF vs w/d, LLE w/d    No new imaging    Labs:                         9.2    11.55 )-----------( 478      ( 10 Nov 2020 04:35 )             29.6     11-10    139  |  105  |  24<H>  ----------------------------<  144<H>  4.4   |  25  |  0.45<L>    Ca    8.5      10 Nov 2020 02:27  Phos  2.3     11-10  Mg     2.6     11-10    PT/INR - ( 10 Nov 2020 02:27 )   PT: 13.9 sec;   INR: 1.17 ratio         PTT - ( 10 Nov 2020 02:27 )  PTT:32.2 sec

## 2020-11-11 NOTE — PROGRESS NOTE ADULT - ASSESSMENT
81 y/o  woman with dementia, hydrocephalus s/p VPS admitted after fall at nursing facility with LOC and unresponsiveness.  Initial CTH with acute SDH L>R and on interhemispheric fissure and tentorial leaves with rightward shift 11mm decreased to 7mm. Repeat CTH with new R inferior cerebellar hypodensity concerning for infarct. Neuro exam without appreciable interval change: intubated, C-collar, no verbal output, not following commands, movement of extremities L>R w/ noxious stimuli. EEG today with L slowing and R periodic discharges. no seizures x 3 days       Recommendations:  [] c/w Keppra 500mg BID indefinitely for now given EEG results  []  SBP goal <160  [x] Hold antiplatelets given SDH; consider when cleared from nsx standpoint.  [x] Lipitor 80mg can be reduced to 40mg  [x] LDL 84, HbA1C 5.6  [x] Telemetry monitoring  [x] SBP goal normotensive, less than 160/90.   [x]VEEG reviewed   [x]TTE reviewed  [ ] MRI/MRA when able if in agreement with GOC of family  [x] LE dopplers: no DVT   [ ] GOC discussion pending, likely needs trach/PEG

## 2020-11-11 NOTE — PROGRESS NOTE ADULT - SUBJECTIVE AND OBJECTIVE BOX
No acute events reported over the past 24hrs - Added lactulose for lack of BMs    Patient seen and examined at bedside. Intubated, not sedated.   Tube feeds pivot @ 55.      PHYSICAL EXAM:  General: intubated, appears to be in NAD.   Chest: Intubated on mechanical ventilation.   Neuro:  Not following commands, with spontaneous movement of extremities.    Abdomen: soft, nontender, nondistended       Vital Signs Last 24 Hrs  T(C): 36.3 (11 Nov 2020 07:00), Max: 36.9 (11 Nov 2020 03:00)  T(F): 97.3 (11 Nov 2020 07:00), Max: 98.4 (11 Nov 2020 03:00)  HR: 80 (11 Nov 2020 08:08) (74 - 104)  BP: 144/71 (11 Nov 2020 08:00) (132/61 - 184/83)  BP(mean): 101 (11 Nov 2020 08:00) (88 - 120)  RR: 18 (11 Nov 2020 08:00) (12 - 24)  SpO2: 99% (11 Nov 2020 08:08) (97% - 100%)    I&O's Detail    10 Nov 2020 07:01  -  11 Nov 2020 07:00  --------------------------------------------------------  IN:    IV PiggyBack: 125 mL    Pivot 1.5: 1320 mL  Total IN: 1445 mL    OUT:    Incontinent per Collection Bag (mL): 1250 mL  Total OUT: 1250 mL    Total NET: 195 mL      11 Nov 2020 07:01  -  11 Nov 2020 09:18  --------------------------------------------------------  IN:    IV PiggyBack: 250 mL    Pivot 1.5: 55 mL  Total IN: 305 mL    OUT:  Total OUT: 0 mL    Total NET: 305 mL          11-11    142  |  106  |  30<H>  ----------------------------<  116<H>  4.6   |  24  |  0.48<L>    Ca    9.2      11 Nov 2020 05:39  Phos  2.9     11-11  Mg     2.8     11-11                              9.1    12.20 )-----------( 489      ( 11 Nov 2020 05:39 )             29.4       PT/INR - ( 10 Nov 2020 02:27 )   PT: 13.9 sec;   INR: 1.17 ratio         PTT - ( 10 Nov 2020 02:27 )  PTT:32.2 sec    LABS:                         9.1    12.20 )-----------( 489      ( 11 Nov 2020 05:39 )             29.4     11-11    142  |  106  |  30<H>  ----------------------------<  116<H>  4.6   |  24  |  0.48<L>    Ca    9.2      11 Nov 2020 05:39  Phos  2.9     11-11  Mg     2.8     11-11      PT/INR - ( 10 Nov 2020 02:27 )   PT: 13.9 sec;   INR: 1.17 ratio         PTT - ( 10 Nov 2020 02:27 )  PTT:32.2 sec          RADIOLOGY, EKG & ADDITIONAL TESTS: Reviewed.     MEDICATIONS  (STANDING):  acetaminophen    Suspension .. 650 milliGRAM(s) Oral every 6 hours  amLODIPine   Tablet 10 milliGRAM(s) Oral daily  atorvastatin 40 milliGRAM(s) Oral at bedtime  chlorhexidine 0.12% Liquid 15 milliLiter(s) Oral Mucosa every 12 hours  chlorhexidine 2% Cloths 1 Application(s) Topical <User Schedule>  enoxaparin Injectable 40 milliGRAM(s) SubCutaneous daily  lactulose Syrup 20 Gram(s) Enteral Tube three times a day  levETIRAcetam 500 milliGRAM(s) Oral every 12 hours  lidocaine   Patch 1 Patch Transdermal every 24 hours  metoprolol tartrate 50 milliGRAM(s) Oral every 12 hours  nystatin Powder 1 Application(s) Topical two times a day  pantoprazole  Injectable 40 milliGRAM(s) IV Push daily  polyethylene glycol 3350 17 Gram(s) Oral two times a day  senna Syrup 10 milliLiter(s) Oral at bedtime  sodium chloride 1 Gram(s) Oral every 8 hours    MEDICATIONS  (PRN):  labetalol Injectable 10 milliGRAM(s) IV Push every 4 hours PRN SBP > 180

## 2020-11-12 LAB
ANION GAP SERPL CALC-SCNC: 14 MMOL/L — SIGNIFICANT CHANGE UP (ref 5–17)
APPEARANCE UR: ABNORMAL
APTT BLD: 26.4 SEC — LOW (ref 27.5–35.5)
BILIRUB UR-MCNC: NEGATIVE — SIGNIFICANT CHANGE UP
BUN SERPL-MCNC: 36 MG/DL — HIGH (ref 7–23)
CALCIUM SERPL-MCNC: 9.1 MG/DL — SIGNIFICANT CHANGE UP (ref 8.4–10.5)
CHLORIDE SERPL-SCNC: 109 MMOL/L — HIGH (ref 96–108)
CO2 SERPL-SCNC: 22 MMOL/L — SIGNIFICANT CHANGE UP (ref 22–31)
COLOR SPEC: YELLOW — SIGNIFICANT CHANGE UP
CREAT SERPL-MCNC: 0.49 MG/DL — LOW (ref 0.5–1.3)
DIFF PNL FLD: ABNORMAL
GLUCOSE SERPL-MCNC: 165 MG/DL — HIGH (ref 70–99)
GLUCOSE UR QL: NEGATIVE — SIGNIFICANT CHANGE UP
HCT VFR BLD CALC: 30.3 % — LOW (ref 34.5–45)
HGB BLD-MCNC: 9.5 G/DL — LOW (ref 11.5–15.5)
INR BLD: 1.25 RATIO — HIGH (ref 0.88–1.16)
KETONES UR-MCNC: SIGNIFICANT CHANGE UP
LEUKOCYTE ESTERASE UR-ACNC: ABNORMAL
MAGNESIUM SERPL-MCNC: 2.8 MG/DL — HIGH (ref 1.6–2.6)
MCHC RBC-ENTMCNC: 27.9 PG — SIGNIFICANT CHANGE UP (ref 27–34)
MCHC RBC-ENTMCNC: 31.4 GM/DL — LOW (ref 32–36)
MCV RBC AUTO: 88.9 FL — SIGNIFICANT CHANGE UP (ref 80–100)
NITRITE UR-MCNC: NEGATIVE — SIGNIFICANT CHANGE UP
NRBC # BLD: 0 /100 WBCS — SIGNIFICANT CHANGE UP (ref 0–0)
PH UR: 6 — SIGNIFICANT CHANGE UP (ref 5–8)
PHOSPHATE SERPL-MCNC: 3 MG/DL — SIGNIFICANT CHANGE UP (ref 2.5–4.5)
PLATELET # BLD AUTO: 477 K/UL — HIGH (ref 150–400)
POTASSIUM SERPL-MCNC: 4.2 MMOL/L — SIGNIFICANT CHANGE UP (ref 3.5–5.3)
POTASSIUM SERPL-SCNC: 4.2 MMOL/L — SIGNIFICANT CHANGE UP (ref 3.5–5.3)
PROT UR-MCNC: ABNORMAL
PROTHROM AB SERPL-ACNC: 14.8 SEC — HIGH (ref 10.6–13.6)
RBC # BLD: 3.41 M/UL — LOW (ref 3.8–5.2)
RBC # FLD: 15.7 % — HIGH (ref 10.3–14.5)
SODIUM SERPL-SCNC: 145 MMOL/L — SIGNIFICANT CHANGE UP (ref 135–145)
SP GR SPEC: 1.03 — HIGH (ref 1.01–1.02)
UROBILINOGEN FLD QL: ABNORMAL
WBC # BLD: 10.37 K/UL — SIGNIFICANT CHANGE UP (ref 3.8–10.5)
WBC # FLD AUTO: 10.37 K/UL — SIGNIFICANT CHANGE UP (ref 3.8–10.5)

## 2020-11-12 PROCEDURE — 71045 X-RAY EXAM CHEST 1 VIEW: CPT | Mod: 26

## 2020-11-12 PROCEDURE — 99232 SBSQ HOSP IP/OBS MODERATE 35: CPT

## 2020-11-12 RX ORDER — HYDROMORPHONE HYDROCHLORIDE 2 MG/ML
0.5 INJECTION INTRAMUSCULAR; INTRAVENOUS; SUBCUTANEOUS ONCE
Refills: 0 | Status: DISCONTINUED | OUTPATIENT
Start: 2020-11-12 | End: 2020-11-12

## 2020-11-12 RX ORDER — ACETAMINOPHEN 500 MG
975 TABLET ORAL EVERY 6 HOURS
Refills: 0 | Status: DISCONTINUED | OUTPATIENT
Start: 2020-11-12 | End: 2020-11-18

## 2020-11-12 RX ADMIN — ATORVASTATIN CALCIUM 40 MILLIGRAM(S): 80 TABLET, FILM COATED ORAL at 21:19

## 2020-11-12 RX ADMIN — Medication 650 MILLIGRAM(S): at 05:04

## 2020-11-12 RX ADMIN — SODIUM CHLORIDE 1 GRAM(S): 9 INJECTION INTRAMUSCULAR; INTRAVENOUS; SUBCUTANEOUS at 05:05

## 2020-11-12 RX ADMIN — AMLODIPINE BESYLATE 10 MILLIGRAM(S): 2.5 TABLET ORAL at 05:05

## 2020-11-12 RX ADMIN — CHLORHEXIDINE GLUCONATE 15 MILLILITER(S): 213 SOLUTION TOPICAL at 05:05

## 2020-11-12 RX ADMIN — HYDROMORPHONE HYDROCHLORIDE 0.5 MILLIGRAM(S): 2 INJECTION INTRAMUSCULAR; INTRAVENOUS; SUBCUTANEOUS at 05:04

## 2020-11-12 RX ADMIN — LIDOCAINE 1 PATCH: 4 CREAM TOPICAL at 05:28

## 2020-11-12 RX ADMIN — Medication 10 MILLIGRAM(S): at 21:19

## 2020-11-12 RX ADMIN — SODIUM CHLORIDE 1 GRAM(S): 9 INJECTION INTRAMUSCULAR; INTRAVENOUS; SUBCUTANEOUS at 21:19

## 2020-11-12 RX ADMIN — POLYETHYLENE GLYCOL 3350 17 GRAM(S): 17 POWDER, FOR SOLUTION ORAL at 05:06

## 2020-11-12 RX ADMIN — CHLORHEXIDINE GLUCONATE 1 APPLICATION(S): 213 SOLUTION TOPICAL at 05:06

## 2020-11-12 RX ADMIN — NYSTATIN CREAM 1 APPLICATION(S): 100000 CREAM TOPICAL at 17:33

## 2020-11-12 RX ADMIN — LACTULOSE 20 GRAM(S): 10 SOLUTION ORAL at 05:04

## 2020-11-12 RX ADMIN — PANTOPRAZOLE SODIUM 40 MILLIGRAM(S): 20 TABLET, DELAYED RELEASE ORAL at 11:46

## 2020-11-12 RX ADMIN — ENOXAPARIN SODIUM 40 MILLIGRAM(S): 100 INJECTION SUBCUTANEOUS at 11:46

## 2020-11-12 RX ADMIN — CHLORHEXIDINE GLUCONATE 15 MILLILITER(S): 213 SOLUTION TOPICAL at 17:33

## 2020-11-12 RX ADMIN — LEVETIRACETAM 500 MILLIGRAM(S): 250 TABLET, FILM COATED ORAL at 05:04

## 2020-11-12 RX ADMIN — LEVETIRACETAM 500 MILLIGRAM(S): 250 TABLET, FILM COATED ORAL at 17:33

## 2020-11-12 RX ADMIN — Medication 975 MILLIGRAM(S): at 16:38

## 2020-11-12 RX ADMIN — Medication 975 MILLIGRAM(S): at 17:10

## 2020-11-12 RX ADMIN — SODIUM CHLORIDE 1 GRAM(S): 9 INJECTION INTRAMUSCULAR; INTRAVENOUS; SUBCUTANEOUS at 13:33

## 2020-11-12 RX ADMIN — NYSTATIN CREAM 1 APPLICATION(S): 100000 CREAM TOPICAL at 05:06

## 2020-11-12 RX ADMIN — HYDROMORPHONE HYDROCHLORIDE 0.5 MILLIGRAM(S): 2 INJECTION INTRAMUSCULAR; INTRAVENOUS; SUBCUTANEOUS at 05:15

## 2020-11-12 RX ADMIN — LACTULOSE 20 GRAM(S): 10 SOLUTION ORAL at 13:33

## 2020-11-12 RX ADMIN — Medication 50 MILLIGRAM(S): at 17:33

## 2020-11-12 RX ADMIN — Medication 650 MILLIGRAM(S): at 05:28

## 2020-11-12 RX ADMIN — Medication 50 MILLIGRAM(S): at 05:05

## 2020-11-12 NOTE — PROGRESS NOTE ADULT - ASSESSMENT
80F PMHx NPH s/p Shunt approx 10 years ago, recent hip Fx, found down at rehab today, CT head in ED shows acute left, interhemispheric, small R SDH.  shunt ligated at clavicle bedside in SICU, repeat CT after ligation shows stable SDH followed by interval improvement in heme and mls. ADM trauma/SICU  - [facility] disc/family mtg planned for Friday per primary re: trach/peg. Conservative nsgy mgmt at this time  - DNR  - off sedation  - keppra 500 bid  - eeg dced  - resolving heme on last hct

## 2020-11-12 NOTE — PROGRESS NOTE ADULT - ATTENDING COMMENTS
Patient seen and examined on SICU AM rounds  To my exam - opens eyes to pain only, intermittently localizing pain, not following commands, not tracking with eyes   Hemodynamically stable  Oxygenating well on vent on pressure support ventilation.  Doubt would be able to adequately protect airway if extubated    I have discussed care with Dr. Bong Johnson today:  We agree:  1. Likely very poor prognosis.  2. Although the patient may eventually be a candidate for SDH evacuation via [place], this procedure will not immediately improve the patient's mental status.    3. For long-term management, if the goals of care are NOT palliative, the patient would need a tracheostomy / PEG for ongoing management. Patient seen and examined on SICU AM rounds  To my exam - opens eyes to pain only, intermittently localizing pain, not following commands, not tracking with eyes   Hemodynamically stable  Oxygenating well on vent on pressure support ventilation.  Doubt would be able to adequately protect airway if extubated  Tube feeds at goal    I have discussed care with Dr. Bong Johnson today:  We agree:  1. Likely very poor prognosis.  2. Although the patient may eventually be a candidate for SDH evacuation via [place], this procedure will not immediately improve the patient's mental status.    3. For long-term management, if the goals of care are NOT palliative, the patient would need a tracheostomy / PEG for ongoing management.

## 2020-11-12 NOTE — PROGRESS NOTE ADULT - ASSESSMENT
80 year old female, with a medical history significant for dementia, not on A/C, BIBEMS unresponsive with head injury, reportedly fell, intubated in the ED to protect airway (GS=8), imaging studies showed left acute SDH, repeat CT after  shunt ligation shows stable SDH and left to right shift. CT head 4/11 revealed decreased subdural hematoma and midline shift 11mm-> 7 mm. New low density cerebellar focus suspicious for infarcts. Pt neuro status improved this am, following commands and opens the eyes spontaneously.      Plan:   - Pain control   - c/w intubation and mechanical ventilation.   - Now off all sedation  - Recommend trach and PEG, pending discussion.  - Appreciate neurosurgery:  Conservative management.   - Follow up with family meeting discussing of [facility] friday.   - Appreciate SICU care  - DNR    [facility]  [id number]

## 2020-11-12 NOTE — PROGRESS NOTE ADULT - SUBJECTIVE AND OBJECTIVE BOX
Patient seen and examined at bedside.    --Anticoagulation--    T(C): 36.4 (11-12-20 @ 07:00), Max: 36.7 (11-11-20 @ 15:00)  HR: 87 (11-12-20 @ 07:00) (73 - 110)  BP: 144/67 (11-12-20 @ 07:00) (112/63 - 186/88)  RR: 17 (11-12-20 @ 07:00) (13 - 30)  SpO2: 99% (11-12-20 @ 07:00) (96% - 100%)  Wt(kg): --    Exam:  intubated, pupils 3R, [place], LUE localizes, RUE trace mvmt, RLE TF vs w/d, LLE w/d    No new imaging    Labs:                         9.5    10.37 )-----------( 477      ( 12 Nov 2020 00:24 )             30.3   11-12    145  |  109<H>  |  36<H>  ----------------------------<  165<H>  4.2   |  22  |  0.49<L>    Ca    9.1      12 Nov 2020 00:24  Phos  3.0     11-12  Mg     2.8     11-12    PT/INR - ( 12 Nov 2020 01:25 )   PT: 14.8 sec;   INR: 1.25 ratio         PTT - ( 12 Nov 2020 01:25 )  PTT:26.4 sec

## 2020-11-12 NOTE — PROGRESS NOTE ADULT - ASSESSMENT
80F hx of dementia, hydrocephalus s/p  shunt and recent hip fracture non-operative management at SNF presents from SNF after a mechanical fall, level I trauma activated, primary survey significant for GCS 8, decision was made to intubate in trauma bay. Secondary survey significant for L forehead hematoma, R lower abdomen abrasion. CT scan significant for large L SDH with midline shift and L 8-10 rib fx. NSGY and family discussed, plan for non-operative management at this time. CT head 4/11 revealed decreased subdural hematoma and midline shift 11mm-> 7 mm. New low density cerebellar focus suspicious for infarcts.   As per neurosurgery note "GOC discussion pending, likely needs trach/PEG"    PLAN:  Neuro: s/p  shunt ligation at level of clavicle  - q4h neurochecks  - CT head 11/9 increased size of ventricles and hemorrhage layering in occipital horns, similar midline shift and subdural hemorrhage  - Plan for possible hazel hole (HD10) if patients neuro status continues to improve  - EEG with focal area of high seizure risk, keppra 500mg BID for seizure prophylaxis, f/u EEG final read today  - Off all sedation; PO tylenol, lidoderm  - f/u discussions with family and neurosurgery team    Resp: L 8-10 rib fx  - Intubated, CPAP 10/5  - Trach planning pending GOC discussions   - Daily CXR    CV: Goal SBP <200  - Home atorvastatin  - Metoprolol 50mg Q12  - Amlodipine 10mg daily  - Labetalol 10mg IV Q4 PRN    GI: no acute issues  - Tube feeds pivot @ 55  - Liver mass (+ renal mass) finding discussed with son   - Discussion of PEG pending GOC   - Protonix for stress ulcer prophylaxis  - miralax/senna/lactulose    : page removed 11/9, passed TOV  - Monitor I/Os, UOP  - Salt tabs 1g q8hr for goal > 140, f/u daily BMP    Heme: acute anemia  - Lovenox VTE ppx started 11/6  - BLE duplex negative for DVT 11/6    ID: UTI s/p CTX course  - Monitor clinically for signs of active infection    Endo: no acute issues  - Monitor glucose on BMP    Dispo:  - SICU  - DNR

## 2020-11-12 NOTE — PROGRESS NOTE ADULT - SUBJECTIVE AND OBJECTIVE BOX
HISTORY  80F hx of dementia, hydrocephalus s/p  shunt and recent hip fracture non-operative management at SNF presents from SNF after a mechanical fall, level I trauma activated, primary survey significant for GCS 8, decision was made to intubate in trauma bay. Secondary survey significant for L forehead hematoma, R lower abdomen abrasion. CT scan significant for large L SDH with midline shift and L 8-10 rib fx. NSGY and family discussed, plan for non-operative management at this time. Patient was transferred to SICU for vent management and hemodynamic monitoring.    NSGY ligated  shunt at bedside. Repeat CT head was stable. Patient was briefly started on a cardene gtt for hypertensive urgency, goal SBP < 180. UA+ started on meropenem. Plan per NSGY to continue conservative management with potential hazel hole in future. KO tube feeds started on 11/4. Metoprolol 25Q12 started, Labetalol Q4PRN for HTN. Due to pcn allergy meropenum given for UTI than switched to Cefepime then to Ceftriaxone.      24 HOUR EVENTS:  -EEG negative for seizure  -added lactulose for no BM    SUBJECTIVE/ROS:  [ ] A ten-point review of systems was otherwise negative except as noted.  [X ] Due to altered mental status/intubation, subjective information were not able to be obtained from the patient. History was obtained, to the extent possible, from review of the chart and collateral sources of information.      NEURO  RASS: -3  Exam: Opens eyes spontaneously, responsive pupils b/l, positive gag, moves LLE spontanous, withdraws all four extremities to pain  Meds: acetaminophen    Suspension .. 650 milliGRAM(s) Oral every 6 hours  levETIRAcetam  Solution 500 milliGRAM(s) Enteral Tube two times a day    [x] Adequacy of sedation and pain control has been assessed and adjusted      RESPIRATORY  RR: 22 (11-12-20 @ 01:00) (12 - 30)  SpO2: 99% (11-12-20 @ 01:00) (97% - 100%)  Wt(kg): --  Exam:   Mechanical Ventilation: Mode: CPAP with PS, RR (patient): 18, FiO2: 30, PEEP: 5, PS: 10, MAP: 9, PIP: 15  ABG - ( 11 Nov 2020 06:15 )  pH: 7.51  /  pCO2: 32    /  pO2: 129   / HCO3: 25    / Base Excess: 2.5   /  SaO2: 99      Lactate: x                [ ] Extubation Readiness Assessed  Meds:       CARDIOVASCULAR  HR: 97 (11-12-20 @ 01:00) (73 - 108)  BP: 157/105 (11-12-20 @ 01:00) (126/62 - 186/88)  BP(mean): 119 (11-12-20 @ 01:00) (85 - 126)  ABP: --  ABP(mean): --  Wt(kg): --  CVP(cm H2O): --      Exam:  Cardiac Rhythm:  Perfusion     [X ]Adequate   [ ]Inadequate  Mentation   [X ]Normal       [ ]Reduced  Extremities  [X ]Warm         [ ]Cool  Volume Status [ ]Hypervolemic [ X]Euvolemic [ ]Hypovolemic  Meds: amLODIPine   Tablet 10 milliGRAM(s) Oral daily  labetalol Injectable 10 milliGRAM(s) IV Push every 4 hours PRN SBP > 180  metoprolol tartrate 50 milliGRAM(s) Oral every 12 hours        GI/NUTRITION  Exam:  Diet:  Meds: lactulose Syrup 20 Gram(s) Enteral Tube three times a day  pantoprazole  Injectable 40 milliGRAM(s) IV Push daily  polyethylene glycol 3350 17 Gram(s) Oral two times a day  senna Syrup 10 milliLiter(s) Oral at bedtime      GENITOURINARY  I&O's Detail    11-10 @ 07:01  -  11-11 @ 07:00  --------------------------------------------------------  IN:    IV PiggyBack: 125 mL    Pivot 1.5: 1320 mL  Total IN: 1445 mL    OUT:    Incontinent per Collection Bag (mL): 1250 mL  Total OUT: 1250 mL    Total NET: 195 mL      11-11 @ 07:01  -  11-12 @ 02:28  --------------------------------------------------------  IN:    Enteral Tube Flush: 200 mL    IV PiggyBack: 250 mL    Pivot 1.5: 990 mL  Total IN: 1440 mL    OUT:    Incontinent per Collection Bag (mL): 450 mL  Total OUT: 450 mL    Total NET: 990 mL          11-12    145  |  109<H>  |  36<H>  ----------------------------<  165<H>  4.2   |  22  |  0.49<L>    Ca    9.1      12 Nov 2020 00:24  Phos  3.0     11-12  Mg     2.8     11-12      [ ] Carrasco catheter, indication:   Meds: sodium chloride 1 Gram(s) Oral every 8 hours        HEMATOLOGIC  Meds: enoxaparin Injectable 40 milliGRAM(s) SubCutaneous daily    [x] VTE Prophylaxis                        9.5    10.37 )-----------( 477      ( 12 Nov 2020 00:24 )             30.3     PT/INR - ( 12 Nov 2020 01:25 )   PT: 14.8 sec;   INR: 1.25 ratio         PTT - ( 12 Nov 2020 01:25 )  PTT:26.4 sec  Transfusion     [ ] PRBC   [ ] Platelets   [ ] FFP   [ ] Cryoprecipitate      INFECTIOUS DISEASES  T(C): 36.5 (11-11-20 @ 23:00), Max: 36.9 (11-11-20 @ 03:00)  Wt(kg): --  WBC Count: 10.37 K/uL (11-12 @ 00:24)  WBC Count: 12.20 K/uL (11-11 @ 05:39)    Recent Cultures:    Meds:       ENDOCRINE  Capillary Blood Glucose    Meds: atorvastatin 40 milliGRAM(s) Oral at bedtime        ACCESS DEVICES:  [x] Peripheral IV  [ ] Central Venous Line	[ ] R	[ ] L	[ ] IJ	[ ] Fem	[ ] SC	Placed:   [ ] Arterial Line		[ ] R	[ ] L	[ ] Fem	[ ] Rad	[ ] Ax	Placed:   [ ] PICC:					[ ] Mediport  [ ] Urinary Catheter, Date Placed:   [x] Necessity of urinary, arterial, and venous catheters discussed    OTHER MEDICATIONS:  chlorhexidine 0.12% Liquid 15 milliLiter(s) Oral Mucosa every 12 hours  chlorhexidine 2% Cloths 1 Application(s) Topical <User Schedule>  nystatin Powder 1 Application(s) Topical two times a day      CODE STATUS: Yes        IMAGING:

## 2020-11-12 NOTE — PROGRESS NOTE ADULT - SUBJECTIVE AND OBJECTIVE BOX
24 HOUR EVENTS:  -EEG negative for seizure  -Added lactulose for no BM      Patient seen and examined at bedside. Intubated, not sedated.   Tube feeds pivot @ 55.      PHYSICAL EXAM:  General: intubated, appears to be in NAD.   Chest: Intubated on mechanical ventilation.   Neuro: Following commands, with spontaneous movement of extremities.   Abdomen: soft, nontender, nondistended perfused      Vital Signs Last 24 Hrs  T(C): 36.4 (2020 07:00), Max: 36.7 (2020 15:00)  T(F): 97.5 (2020 07:00), Max: 98.1 (2020 15:00)  HR: 90 (2020 08:50) (73 - 110)  BP: 133/63 (2020 08:00) (112/63 - 186/88)  BP(mean): 90 (2020 08:00) (81 - 126)  RR: 14 (2020 08:00) (13 - 30)  SpO2: 100% (2020 08:50) (96% - 100%)    I&O's Detail    2020 07:01  -  2020 07:00  --------------------------------------------------------  IN:    Enteral Tube Flush: 200 mL    IV PiggyBack: 250 mL    Pivot 1.5: 1320 mL  Total IN: 1770 mL    OUT:    Incontinent per Collection Bag (mL): 600 mL  Total OUT: 600 mL    Total NET: 1170 mL      2020 07:01  -  2020 09:04  --------------------------------------------------------  IN:    Pivot 1.5: 55 mL  Total IN: 55 mL    OUT:  Total OUT: 0 mL    Total NET: 55 mL              145  |  109<H>  |  36<H>  ----------------------------<  165<H>  4.2   |  22  |  0.49<L>    Ca    9.1      2020 00:24  Phos  3.0     11-12  Mg     2.8     11-12                              9.5    10.37 )-----------( 477      ( 2020 00:24 )             30.3       PT/INR - ( : )   PT: 14.8 sec;   INR: 1.25 ratio         PTT - (  )  PTT:26.4 sec    LABS:                         9.5    10.37 )-----------( 477      ( 2020 00:24 )             30.3     11-12    145  |  109<H>  |  36<H>  ----------------------------<  165<H>  4.2   |  22  |  0.49<L>    Ca    9.1      2020 00:24  Phos  3.0     11-12  Mg     2.8     11-12      PT/INR - ( : )   PT: 14.8 sec;   INR: 1.25 ratio         PTT - ( : )  PTT:26.4 sec  Urinalysis Basic - ( 2020 05:23 )    Color: Yellow / Appearance: Slightly Turbid / S.029 / pH: x  Gluc: x / Ketone: Trace  / Bili: Negative / Urobili: 3 mg/dL   Blood: x / Protein: 30 mg/dL / Nitrite: Negative   Leuk Esterase: Moderate / RBC: 7 /hpf / WBC 12 /HPF   Sq Epi: x / Non Sq Epi: 4 /hpf / Bacteria: Negative          MEDICATIONS  (STANDING):  amLODIPine   Tablet 10 milliGRAM(s) Oral daily  atorvastatin 40 milliGRAM(s) Oral at bedtime  chlorhexidine 0.12% Liquid 15 milliLiter(s) Oral Mucosa every 12 hours  chlorhexidine 2% Cloths 1 Application(s) Topical <User Schedule>  enoxaparin Injectable 40 milliGRAM(s) SubCutaneous daily  lactulose Syrup 20 Gram(s) Enteral Tube three times a day  levETIRAcetam  Solution 500 milliGRAM(s) Enteral Tube two times a day  metoprolol tartrate 50 milliGRAM(s) Oral every 12 hours  nystatin Powder 1 Application(s) Topical two times a day  pantoprazole  Injectable 40 milliGRAM(s) IV Push daily  polyethylene glycol 3350 17 Gram(s) Oral two times a day  senna Syrup 10 milliLiter(s) Oral at bedtime  sodium chloride 1 Gram(s) Oral every 8 hours    MEDICATIONS  (PRN):  acetaminophen    Suspension .. 975 milliGRAM(s) Oral every 6 hours PRN Mild Pain (1 - 3)  labetalol Injectable 10 milliGRAM(s) IV Push every 4 hours PRN SBP > 180

## 2020-11-12 NOTE — PROGRESS NOTE ADULT - SUBJECTIVE AND OBJECTIVE BOX
Neurology Progress Note    S: Patient seen and examined. No new events overnight. no significant change. awaiting family decision    Medication:  acetaminophen   Tablet .. 650 milliGRAM(s) Oral every 6 hours  amLODIPine   Tablet 10 milliGRAM(s) Oral daily  atorvastatin 40 milliGRAM(s) Oral at bedtime  chlorhexidine 0.12% Liquid 15 milliLiter(s) Oral Mucosa every 12 hours  chlorhexidine 2% Cloths 1 Application(s) Topical <User Schedule>  enoxaparin Injectable 40 milliGRAM(s) SubCutaneous daily  labetalol Injectable 10 milliGRAM(s) IV Push every 4 hours PRN  levETIRAcetam  IVPB 500 milliGRAM(s) IV Intermittent every 12 hours  lidocaine   Patch 1 Patch Transdermal every 24 hours  metoprolol tartrate 50 milliGRAM(s) Oral every 12 hours  nystatin Powder 1 Application(s) Topical two times a day  pantoprazole  Injectable 40 milliGRAM(s) IV Push daily  polyethylene glycol 3350 17 Gram(s) Oral two times a day  senna 2 Tablet(s) Oral at bedtime  sodium chloride 1 Gram(s) Oral every 8 hours      Vitals:  Vital Signs Last 24 Hrs  T(C): 37.3 (09 Nov 2020 11:00), Max: 37.7 (08 Nov 2020 23:00)  T(F): 99.1 (09 Nov 2020 11:00), Max: 99.9 (08 Nov 2020 23:00)  HR: 95 (09 Nov 2020 11:00) (78 - 104)  BP: 172/79 (09 Nov 2020 11:00) (148/69 - 215/112)  BP(mean): 114 (09 Nov 2020 11:00) (99 - 148)  RR: 23 (09 Nov 2020 11:00) (19 - 26)  SpO2: 98% (09 Nov 2020 11:00) (97% - 100%)       General Exam:   Constitutional: Appears stated age, intubated. c collar  Neurological (>12):  MS: Intubated, not following commands, no response to vocal stimulation. Grimaces to sternal rub.   Language: Intubated.   CNs: (R = 2mm, L = 2mm) Poorly reactive, right eye exotropia.  No noted blink to threat. Corneal reflex intact. No facial asymmetry b/l.   Motor: Increased tone in the LLE in extension. no tremors at this time.  Sensory: Withdrawal to noxious stimuli in all 4 extremities LLE>RLE. Reduced withdrawal in the LLE.   Coordination: unable to test  Gait: Deferred       I personally reviewed the below data/images/labs:      CBC Full  -  ( 09 Nov 2020 00:11 )  WBC Count : 11.38 K/uL  RBC Count : 3.24 M/uL  Hemoglobin : 9.0 g/dL  Hematocrit : 28.4 %  Platelet Count - Automated : 438 K/uL  Mean Cell Volume : 87.7 fl  Mean Cell Hemoglobin : 27.8 pg  Mean Cell Hemoglobin Concentration : 31.7 gm/dL  Auto Neutrophil # : x  Auto Lymphocyte # : x  Auto Monocyte # : x  Auto Eosinophil # : x  Auto Basophil # : x  Auto Neutrophil % : x  Auto Lymphocyte % : x  Auto Monocyte % : x  Auto Eosinophil % : x  Auto Basophil % : x    11-09    136  |  104  |  18  ----------------------------<  149<H>  3.9   |  24  |  0.42<L>    Ca    8.2<L>      09 Nov 2020 00:11  Phos  2.2     11-09  Mg     2.4     11-09        PT/INR - ( 09 Nov 2020 00:11 )   PT: 14.6 sec;   INR: 1.23 ratio         PTT - ( 09 Nov 2020 00:11 )  PTT:28.5 sec    < from: TTE with Doppler (w/Cont) (11.07.20 @ 10:30) >  OBSERVATIONS:  Mitral Valve: Mild mitral annular calcification. Mild  mitral regurgitation.  Aortic Root: The aortic root was not well seen.  Aortic Valve: The aortic valve is not visualized in short  axis imaging for anatomic evaluation.  There is no aortic stenosis. Peak transaortic valve  gradient equals 10 mm Hg, mean transaortic valve gradient  equals 5 mm Hg, aortic valve velocity time integral equals  30 cm. Mild aortic regurgitation.  Peak left ventricular  outflow tract gradient equals 3 mm Hg, mean gradient is  equal to 1 mm Hg, LVOT velocity time integral equals 17 cm.  Left Atrium: Mildly dilated left atrium.  LA volume index =  41 cc/m2.  Left Ventricle: Normal left ventricular systolic function.  No segmental wall motion abnormalities.  The LVEF= 55-60%.  Normal left ventricular size. Diastolic dysfunction with  elevated left atrial pressures.  Right Heart: Normal right atrial size.  The right atrial area= 14cm2, the right atrial volume=  35ml. Normal right ventricular size and systolic function.  Normal tricuspid valve. Minimal tricuspid regurgitation.  Pulmonic valve not well visualized.  Pericardium/PleuraThere is no pericardial effusion.  Hemodynamic: The estimated right atrial pressure is normal.  There is no shunt at the atrial level with agitated saline.  ------------------------------------------------------------------------  CONCLUSIONS:  1. There is no shunt at the atrial level with agitated  saline.  2. Diastolic dysfunction with elevated left atrial  pressures.  *** Limited echo windows, patient is intubated and supine.   Poor parasternal views.  No previous Echo exam.  ------------------------------------------------------------------------  PROCEDURE DESCRIPTION: Transthoracic echocardiogram with  2-D, M-Mode and complete spectral and color flow Doppler.  Patient was injected with 10 cc's of aerosolized saline.  Patient was injected with 10 cc's of aerosolized saline.  Verbal consent was obtained for injection of  Ultrasonic  Enhancing Agent following a discussion of risks and  benefits. Following intravenous injection of Ultrasonic  Enhancing Agent , harmonic imaging was performed.  ------------------------------------------------------------------------  ECHOCARDIOGRAPHIC EXAMINATION:  LEFT ATRIUM:  LA Volume Index: 41.00 cc/sqm  LA Volume: 74.6 cc  HR and BP:  HR: 90 bpm  BP: 191/86  BSA: 1.80  ------------------------------------------------------------------------  HEMODYNAMICS:  RA Pressure Estimate: 8  ------------------------------------------------------------------------  COLOR FLOW and SPECIAL DOPPLER:  LVOT:  LVOT Velocity: .8 M/sec  LVOT Peak Gradient Rest: 2 mm Hg  LVOT Mean Gradient Rest: 1mm Hg  ------------------------------------------------------------------------  DIASTOLIC FUNCTION:  DT:470 ms  E/A: 0.71  e' Septal: 5.0 cm/s  E/e' Septal: 14.2  e' Lateral: 4.0 cm/s  E/e' Lateral: 17.7  MV E wave: 0.7 m/s  MV A wave: 1.0 m/s  Septal a': 12.0 cm/s  Lateral a': 15.0 cm/s    < end of copied text >        EEG Classification / Summary:  Abnormal EEG study  Moderate background slowing, amplitudes reduced over the left.  Frequent runs of 1hz Left central max periodic epileptiform discharges were present.    -----------------------------------------------------------------------------------------------------    Clinical Impression:  High risk of focal onset seizures from the left paracentral region. Seizure prophylaxis recommended.  Moderate multifocal cerebral dysfunction, with fluid attenuation effect on the left.   Radiology (XR, CT, MR, U/S, TTE/JUNAID):    < from: CT Head No Cont (11.04.20 @ 13:13) >  FINDINGS:    Redemonstration of right parietal approach ventriculostomy catheter in unchanged position.    Acute left lateral convexity subdural hemorrhage measures 1.7 cm in greatest depth, decreased from 1.9 cm.    Acute right lateral convexity subdural hemorrhage measures 6 mm in greatest depth, similar to the prior examination.    Acute subdural hemorrhage in the interhemispheric fissure measures 9 mm in greatest thickness, previously measuring 11 mm    Acute subdural hemorrhage along the tentorial leaves is similar, measuring4 to 5 mm in greatest thickness on the left.    Rightward midline shift of 7 mm is decreased from 11 mm. Increased ventricular size, no large hydrocephalus. Basal cisterns are more well visualized on the current examination.    Similar nonspecific 6 mm focus of increased attenuation in the left corona radiata (2-22).    New foci of low density in the right inferior cerebellar hemisphere, suspicious for acute infarction. No CT evidence for hemorrhagic transformation.    Similar extensive white matter microvascular ischemic disease.    No displaced calvarial fracture. The visualized paranasal sinuses and mastoid air cells are clear.    IMPRESSION:  New foci of low density in the right inferior cerebellar hemisphere, suspicious for acute infarction. No CT evidence for hemorrhagic transformation.    Decreased size of left lateral convexity subdural hemorrhage. Similar size of right lateral convexity subdural hemorrhage.    Decreased size of interhemispheric subdural hemorrhage. Similar tentorial subdural hemorrhage.    Decreased rightward midline shift, currently 7 mm, previously 11 mm.    Increased ventricular size, no large hydrocephalus. Basal cisterns are more well visualized on the current examination.    < end of copied text >    < from: CT Head No Cont (11.03.20 @ 23:29) >  Bilateral supratentorial subdural hematomas are again identified. The subdural hematoma on the left side measures approximately 2.2 cm in widest diameter and on the right side measures approximately 0.5 cm in widest diameter. Acute subdural hematoma along the interhemispheric region is seen along the left side and measures approximately 0.9 cm widest diameter. Acute subdural hematomas along bilateral tentorial region are again seen as well. There is mass effect on left lateral ventricle. Left-to-right shift (1.1 cm) is again seen.    Right parietal shunt catheter is again seen. This catheter appears unchanged in position.    Evaluation of the osseous structures with the appropriate window appears normal    The visualized paranasal sinuses mastoid and middle ear regions appear clear.    IMPRESSION: No stiffing change when allowing for differences in technique.    < end of copied text >    EEG Classification / Summary:  Abnormal EEG study  Moderate background slowing, amplitudes reduced over the left.  Intermittent 0.5 to 1hz right periodic discharges, higher amplitudes over the right    -----------------------------------------------------------------------------------------------------    Clinical Impression:  Risk of focal onset seizures from the frontal regions.   Left hemispheric structural or functional abnormality  Moderate multifocal cerebral dysfunction, with fluid attenuation effect on the left.       11-10-20  Clinical Impression:  Risk of focal onset seizures from the frontal regions.   Left hemispheric structural or functional abnormality  Moderate multifocal cerebral dysfunction, with fluid attenuation effect on the left.   No change vs prior day.  No seizures x 3 days

## 2020-11-12 NOTE — PROGRESS NOTE ADULT - ASSESSMENT
79 y/o  woman with dementia, hydrocephalus s/p VPS admitted after fall at nursing facility with LOC and unresponsiveness.  Initial CTH with acute SDH L>R and on interhemispheric fissure and tentorial leaves with rightward shift 11mm decreased to 7mm. Repeat CTH with new R inferior cerebellar hypodensity concerning for infarct. Neuro exam without appreciable interval change: intubated, C-collar, no verbal output, not following commands, movement of extremities L>R w/ noxious stimuli. EEG today with L slowing and R periodic discharges. no seizures x 3 days       Recommendations:  [] c/w Keppra 500mg BID indefinitely for now given EEG results  []  SBP goal <160  [x] Hold antiplatelets given SDH; consider when cleared from nsx standpoint.  [x] Lipitor 80mg can be reduced to 40mg  [x] LDL 84, HbA1C 5.6  [x] Telemetry monitoring  [x] SBP goal normotensive, less than 160/90.   [x]VEEG reviewed   [x]TTE reviewed  [ ] MRI/MRA when able if in agreement with GOC of family  [x] LE dopplers: no DVT   [ ] GOC discussion pending, likely needs trach/PEG, son doesn't want surgery. f/u with family

## 2020-11-13 LAB
ANION GAP SERPL CALC-SCNC: 9 MMOL/L — SIGNIFICANT CHANGE UP (ref 5–17)
APTT BLD: 32.9 SEC — SIGNIFICANT CHANGE UP (ref 27.5–35.5)
BUN SERPL-MCNC: 39 MG/DL — HIGH (ref 7–23)
CALCIUM SERPL-MCNC: 8.8 MG/DL — SIGNIFICANT CHANGE UP (ref 8.4–10.5)
CHLORIDE SERPL-SCNC: 111 MMOL/L — HIGH (ref 96–108)
CO2 SERPL-SCNC: 26 MMOL/L — SIGNIFICANT CHANGE UP (ref 22–31)
CREAT SERPL-MCNC: 0.46 MG/DL — LOW (ref 0.5–1.3)
GLUCOSE SERPL-MCNC: 178 MG/DL — HIGH (ref 70–99)
HCT VFR BLD CALC: 28 % — LOW (ref 34.5–45)
HCT VFR BLD CALC: 28.4 % — LOW (ref 34.5–45)
HGB BLD-MCNC: 8.6 G/DL — LOW (ref 11.5–15.5)
HGB BLD-MCNC: 8.7 G/DL — LOW (ref 11.5–15.5)
INR BLD: 1.2 RATIO — HIGH (ref 0.88–1.16)
MAGNESIUM SERPL-MCNC: 2.7 MG/DL — HIGH (ref 1.6–2.6)
MCHC RBC-ENTMCNC: 27.3 PG — SIGNIFICANT CHANGE UP (ref 27–34)
MCHC RBC-ENTMCNC: 27.4 PG — SIGNIFICANT CHANGE UP (ref 27–34)
MCHC RBC-ENTMCNC: 30.6 GM/DL — LOW (ref 32–36)
MCHC RBC-ENTMCNC: 30.7 GM/DL — LOW (ref 32–36)
MCV RBC AUTO: 89 FL — SIGNIFICANT CHANGE UP (ref 80–100)
MCV RBC AUTO: 89.2 FL — SIGNIFICANT CHANGE UP (ref 80–100)
NRBC # BLD: 0 /100 WBCS — SIGNIFICANT CHANGE UP (ref 0–0)
NRBC # BLD: 0 /100 WBCS — SIGNIFICANT CHANGE UP (ref 0–0)
PHOSPHATE SERPL-MCNC: 2.7 MG/DL — SIGNIFICANT CHANGE UP (ref 2.5–4.5)
PLATELET # BLD AUTO: 481 K/UL — HIGH (ref 150–400)
PLATELET # BLD AUTO: 488 K/UL — HIGH (ref 150–400)
POTASSIUM SERPL-MCNC: 4.4 MMOL/L — SIGNIFICANT CHANGE UP (ref 3.5–5.3)
POTASSIUM SERPL-SCNC: 4.4 MMOL/L — SIGNIFICANT CHANGE UP (ref 3.5–5.3)
PROTHROM AB SERPL-ACNC: 14.3 SEC — HIGH (ref 10.6–13.6)
RBC # BLD: 3.14 M/UL — LOW (ref 3.8–5.2)
RBC # BLD: 3.19 M/UL — LOW (ref 3.8–5.2)
RBC # FLD: 15.6 % — HIGH (ref 10.3–14.5)
RBC # FLD: 15.9 % — HIGH (ref 10.3–14.5)
SODIUM SERPL-SCNC: 146 MMOL/L — HIGH (ref 135–145)
WBC # BLD: 10.39 K/UL — SIGNIFICANT CHANGE UP (ref 3.8–10.5)
WBC # BLD: 9.59 K/UL — SIGNIFICANT CHANGE UP (ref 3.8–10.5)
WBC # FLD AUTO: 10.39 K/UL — SIGNIFICANT CHANGE UP (ref 3.8–10.5)
WBC # FLD AUTO: 9.59 K/UL — SIGNIFICANT CHANGE UP (ref 3.8–10.5)

## 2020-11-13 RX ADMIN — CHLORHEXIDINE GLUCONATE 15 MILLILITER(S): 213 SOLUTION TOPICAL at 17:29

## 2020-11-13 RX ADMIN — NYSTATIN CREAM 1 APPLICATION(S): 100000 CREAM TOPICAL at 05:16

## 2020-11-13 RX ADMIN — LEVETIRACETAM 500 MILLIGRAM(S): 250 TABLET, FILM COATED ORAL at 05:15

## 2020-11-13 RX ADMIN — LEVETIRACETAM 500 MILLIGRAM(S): 250 TABLET, FILM COATED ORAL at 17:29

## 2020-11-13 RX ADMIN — CHLORHEXIDINE GLUCONATE 15 MILLILITER(S): 213 SOLUTION TOPICAL at 05:16

## 2020-11-13 RX ADMIN — NYSTATIN CREAM 1 APPLICATION(S): 100000 CREAM TOPICAL at 17:30

## 2020-11-13 RX ADMIN — PANTOPRAZOLE SODIUM 40 MILLIGRAM(S): 20 TABLET, DELAYED RELEASE ORAL at 11:16

## 2020-11-13 RX ADMIN — Medication 50 MILLIGRAM(S): at 17:29

## 2020-11-13 RX ADMIN — AMLODIPINE BESYLATE 10 MILLIGRAM(S): 2.5 TABLET ORAL at 05:15

## 2020-11-13 RX ADMIN — CHLORHEXIDINE GLUCONATE 1 APPLICATION(S): 213 SOLUTION TOPICAL at 05:16

## 2020-11-13 RX ADMIN — Medication 50 MILLIGRAM(S): at 05:15

## 2020-11-13 RX ADMIN — POLYETHYLENE GLYCOL 3350 17 GRAM(S): 17 POWDER, FOR SOLUTION ORAL at 17:30

## 2020-11-13 RX ADMIN — SENNA PLUS 10 MILLILITER(S): 8.6 TABLET ORAL at 22:53

## 2020-11-13 RX ADMIN — Medication 10 MILLIGRAM(S): at 03:18

## 2020-11-13 RX ADMIN — ENOXAPARIN SODIUM 40 MILLIGRAM(S): 100 INJECTION SUBCUTANEOUS at 11:15

## 2020-11-13 RX ADMIN — ATORVASTATIN CALCIUM 40 MILLIGRAM(S): 80 TABLET, FILM COATED ORAL at 22:53

## 2020-11-13 RX ADMIN — LACTULOSE 20 GRAM(S): 10 SOLUTION ORAL at 14:21

## 2020-11-13 RX ADMIN — Medication 62.5 MILLIMOLE(S): at 01:28

## 2020-11-13 NOTE — PROGRESS NOTE ADULT - SUBJECTIVE AND OBJECTIVE BOX
SURGERY PROGRESS NOTE    SUBJECTIVE / 24H EVENTS:  Patient seen and examined on morning rounds. No acute events overnight.    24 HOUR EVENTS:  -Pt adjusted from 10/ CPAP to full support for several hours after pt became tachypneic, increased WOB, re-adjusted back to CPAP /5.      OBJECTIVE:  VITAL SIGNS:  T(C): 37 (20 @ 08:00), Max: 37 (20 @ 08:00)  HR: 94 (20 @ 09:00) (79 - 111)  BP: 170/78 (20 @ 09:00) (135/79 - 189/86)  RR: 25 (20 @ 09:00) (15 - 26)  SpO2: 99% (20 @ 09:00) (92% - 100%)  Daily     Daily Weight in k.6 (2020 01:00)      PHYSICAL EXAM:  General: intubated, appears to be in NAD.   Chest: Intubated on mechanical ventilation.   Neuro: Occasionally following commands, with spontaneous movement of extremities.   Abdomen: soft, nontender, nondistended   Extremities: well perfused          20 @ 07:01  -  20 @ 07:00  --------------------------------------------------------  IN:    Enteral Tube Flush: 100 mL    IV PiggyBack: 250 mL    Pivot 1.5: 1320 mL  Total IN: 1670 mL    OUT:    Incontinent per Collection Bag (mL): 1075 mL  Total OUT: 1075 mL    Total NET: 595 mL      20 @ 07:01  -  20 @ 09:56  --------------------------------------------------------  IN:    Pivot 1.5: 110 mL  Total IN: 110 mL    OUT:  Total OUT: 0 mL    Total NET: 110 mL          LAB VALUES:      146<H>  |  111<H>  |  39<H>  ----------------------------<  178<H>  4.4   |  26  |  0.46<L>    Ca    8.8      2020 00:28  Phos  2.7     11-13  Mg     2.7     11-13                                 8.7    9.59  )-----------( 488      ( 2020 00:28 )             28.4       PT/INR - ( 2020 00:28 )   PT: 14.3 sec;   INR: 1.20 ratio         PTT - ( 2020 00:28 )  PTT:32.9 sec        Urinalysis Basic - ( 2020 05:23 )    Color: Yellow / Appearance: Slightly Turbid / S.029 / pH: x  Gluc: x / Ketone: Trace  / Bili: Negative / Urobili: 3 mg/dL   Blood: x / Protein: 30 mg/dL / Nitrite: Negative   Leuk Esterase: Moderate / RBC: 7 /hpf / WBC 12 /HPF   Sq Epi: x / Non Sq Epi: 4 /hpf / Bacteria: Negative        MICROBIOLOGY:      RADIOLOGY:        MEDICATIONS  (STANDING):  amLODIPine   Tablet 10 milliGRAM(s) Oral daily  atorvastatin 40 milliGRAM(s) Oral at bedtime  chlorhexidine 0.12% Liquid 15 milliLiter(s) Oral Mucosa every 12 hours  chlorhexidine 2% Cloths 1 Application(s) Topical <User Schedule>  enoxaparin Injectable 40 milliGRAM(s) SubCutaneous daily  lactulose Syrup 20 Gram(s) Enteral Tube three times a day  levETIRAcetam  Solution 500 milliGRAM(s) Enteral Tube two times a day  metoprolol tartrate 50 milliGRAM(s) Oral every 12 hours  nystatin Powder 1 Application(s) Topical two times a day  pantoprazole  Injectable 40 milliGRAM(s) IV Push daily  polyethylene glycol 3350 17 Gram(s) Oral two times a day  senna Syrup 10 milliLiter(s) Oral at bedtime    MEDICATIONS  (PRN):  acetaminophen    Suspension .. 975 milliGRAM(s) Oral every 6 hours PRN Mild Pain (1 - 3)  labetalol Injectable 10 milliGRAM(s) IV Push every 4 hours PRN SBP > 180

## 2020-11-13 NOTE — PROGRESS NOTE ADULT - SUBJECTIVE AND OBJECTIVE BOX
HISTORY  80F hx of dementia, hydrocephalus s/p  shunt and recent hip fracture non-operative management at SNF presents from SNF after a mechanical fall, level I trauma activated, primary survey significant for GCS 8, decision was made to intubate in trauma bay. Secondary survey significant for L forehead hematoma, R lower abdomen abrasion. CT scan significant for large L SDH with midline shift and L 8-10 rib fx. NSGY and family discussed, plan for non-operative management at this time. Patient was transferred to SICU for vent management and hemodynamic monitoring.    NSGY ligated  shunt at bedside. Repeat CT head was stable. Patient was briefly started on a cardene gtt for hypertensive urgency, goal SBP < 180. UA+ started on meropenem. Plan per NSGY to continue conservative management with potential hazel hole in future. KO tube feeds started on 11/4. Metoprolol 25Q12 started, Labetalol Q4PRN for HTN. Due to pcn allergy meropenum given for UTI than switched to Cefepime then to Ceftriaxone.    24 HOUR EVENTS:  -Pt adjusted from 10/5 CPAP to full support for several hours after pt became tachypneic, increased WOB, re-adjusted back to CPAP 5/5.      SUBJECTIVE/ROS:  [ ] A ten-point review of systems was otherwise negative except as noted.  [X] Due to altered mental status/intubation, subjective information were not able to be obtained from the patient. History was obtained, to the extent possible, from review of the chart and collateral sources of information.      NEURO  Exam: Opens eyes spontaneously, responsive pupils b/l, positive gag, moves LLE spontanous, withdraws all four extremities to pain  Meds: acetaminophen    Suspension .. 975 milliGRAM(s) Oral every 6 hours PRN Mild Pain (1 - 3)  levETIRAcetam  Solution 500 milliGRAM(s) Enteral Tube two times a day    [x] Adequacy of sedation and pain control has been assessed and adjusted      RESPIRATORY  RR: 24 (11-13-20 @ 01:00) (13 - 26)  SpO2: 100% (11-13-20 @ 01:00) (92% - 100%)  Wt(kg): --  Exam:   Mechanical Ventilation: Mode: CPAP with PS, RR (patient): 18, FiO2: 30, PEEP: 5, PS: 5, ITime: 10, MAP: 0.7  ABG - ( 11 Nov 2020 06:15 )  pH: 7.51  /  pCO2: 32    /  pO2: 129   / HCO3: 25    / Base Excess: 2.5   /  SaO2: 99      Lactate: x                [ ] Extubation Readiness Assessed  Meds:       CARDIOVASCULAR  HR: 92 (11-13-20 @ 01:00) (83 - 111)  BP: 157/88 (11-13-20 @ 01:00) (112/63 - 189/86)  BP(mean): 112 (11-13-20 @ 01:00) (81 - 123)  ABP: --  ABP(mean): --  Wt(kg): --  CVP(cm H2O): --      Exam:  Cardiac Rhythm: RRR  Perfusion     [X ]Adequate   [ ]Inadequate  Mentation   [X ]Normal       [ ]Reduced  Extremities  [X ]Warm         [ ]Cool  Volume Status [ ]Hypervolemic [X]Euvolemic [ ]Hypovolemic  Meds: amLODIPine   Tablet 10 milliGRAM(s) Oral daily  labetalol Injectable 10 milliGRAM(s) IV Push every 4 hours PRN SBP > 180  metoprolol tartrate 50 milliGRAM(s) Oral every 12 hours        GI/NUTRITION  Exam:  Diet: Tube feeds pivot @ 55  Meds: lactulose Syrup 20 Gram(s) Enteral Tube three times a day  pantoprazole  Injectable 40 milliGRAM(s) IV Push daily  polyethylene glycol 3350 17 Gram(s) Oral two times a day  senna Syrup 10 milliLiter(s) Oral at bedtime      GENITOURINARY  I&O's Detail    11-11 @ 07:01  -  11-12 @ 07:00  --------------------------------------------------------  IN:    Enteral Tube Flush: 200 mL    IV PiggyBack: 250 mL    Pivot 1.5: 1320 mL  Total IN: 1770 mL    OUT:    Incontinent per Collection Bag (mL): 600 mL  Total OUT: 600 mL    Total NET: 1170 mL      11-12 @ 07:01  -  11-13 @ 03:06  --------------------------------------------------------  IN:    Enteral Tube Flush: 100 mL    Pivot 1.5: 1045 mL  Total IN: 1145 mL    OUT:    Incontinent per Collection Bag (mL): 600 mL  Total OUT: 600 mL    Total NET: 545 mL          11-13    146<H>  |  111<H>  |  39<H>  ----------------------------<  178<H>  4.4   |  26  |  0.46<L>    Ca    8.8      13 Nov 2020 00:28  Phos  2.7     11-13  Mg     2.7     11-13      [ ] Carrasco catheter, indication:   Meds:       HEMATOLOGIC  Meds: enoxaparin Injectable 40 milliGRAM(s) SubCutaneous daily    [x] VTE Prophylaxis                        8.7    9.59  )-----------( 488      ( 13 Nov 2020 00:28 )             28.4     PT/INR - ( 13 Nov 2020 00:28 )   PT: 14.3 sec;   INR: 1.20 ratio         PTT - ( 13 Nov 2020 00:28 )  PTT:32.9 sec  Transfusion     [ ] PRBC   [ ] Platelets   [ ] FFP   [ ] Cryoprecipitate      INFECTIOUS DISEASES  T(C): 36.4 (11-12-20 @ 23:00), Max: 36.9 (11-12-20 @ 15:00)  Wt(kg): --  WBC Count: 9.59 K/uL (11-13 @ 00:28)    Recent Cultures:    Meds:       ENDOCRINE  Capillary Blood Glucose    Meds: atorvastatin 40 milliGRAM(s) Oral at bedtime        ACCESS DEVICES:  [x] Peripheral IV  [ ] Central Venous Line	[ ] R	[ ] L	[ ] IJ	[ ] Fem	[ ] SC	Placed:   [ ] Arterial Line		[ ] R	[ ] L	[ ] Fem	[ ] Rad	[ ] Ax	Placed:   [ ] PICC:					[ ] Mediport  [ ] Urinary Catheter, Date Placed:   [x] Necessity of urinary, arterial, and venous catheters discussed    OTHER MEDICATIONS:  chlorhexidine 0.12% Liquid 15 milliLiter(s) Oral Mucosa every 12 hours  chlorhexidine 2% Cloths 1 Application(s) Topical <User Schedule>  nystatin Powder 1 Application(s) Topical two times a day      CODE STATUS: Yes        IMAGING:

## 2020-11-13 NOTE — PROGRESS NOTE ADULT - ASSESSMENT
80F hx of dementia, hydrocephalus s/p  shunt and recent hip fracture non-operative management at SNF presents from SNF after a mechanical fall, level I trauma activated, primary survey significant for GCS 8, decision was made to intubate in trauma bay. Secondary survey significant for L forehead hematoma, R lower abdomen abrasion. CT scan significant for large L SDH with midline shift and L 8-10 rib fx. NSGY and family discussed, plan for non-operative management at this time. CT head 4/11 revealed decreased subdural hematoma and midline shift 11mm-> 7 mm. New low density cerebellar focus suspicious for infarcts.   As per neurosurgery note "GOC discussion pending, likely needs trach/PEG"    PLAN:  Neuro: s/p  shunt ligation at level of clavicle  - q4h neurochecks  - CT head 11/9 increased size of ventricles and hemorrhage layering in occipital horns, similar midline shift and subdural hemorrhage  - Plan for possible hazel hole (HD10) if patients neuro status continues to improve  - EEG with focal area of high seizure risk, keppra 500mg BID for seizure prophylaxis, f/u EEG final read today  - Off all sedation; PO tylenol, lidoderm  - f/u discussions with family and neurosurgery team    Resp: L 8-10 rib fx  - Intubated, CPAP 5/5  - Trach planning pending GOC discussions   - Daily CXR    CV: Goal SBP <200  - Home atorvastatin  - Metoprolol 50mg Q12  - Amlodipine 10mg daily  - Labetalol 10mg IV Q4 PRN    GI: no acute issues  - Tube feeds pivot @ 55  - Liver mass (+ renal mass) finding discussed with son   - Discussion of PEG pending GOC   - Protonix for stress ulcer prophylaxis  - miralax/senna/lactulose    : page removed 11/9, passed TOV  - Monitor I/Os, UOP  - Salt tabs 1g q8hr for goal > 140, f/u daily BMP    Heme: acute anemia  - Lovenox VTE ppx started 11/6  - BLE duplex negative for DVT 11/6    ID: UTI s/p CTX course  - Monitor clinically for signs of active infection    Endo: no acute issues  - Monitor glucose on BMP    Dispo:  - SICU  - DNR

## 2020-11-13 NOTE — PROGRESS NOTE ADULT - ASSESSMENT
79 y/o  woman with dementia, hydrocephalus s/p VPS admitted after fall at nursing facility with LOC and unresponsiveness.  Initial CTH with acute SDH L>R and on interhemispheric fissure and tentorial leaves with rightward shift 11mm decreased to 7mm. Repeat CTH with new R inferior cerebellar hypodensity concerning for infarct. Neuro exam without appreciable interval change: intubated, C-collar, no verbal output, not following commands, movement of extremities L>R w/ noxious stimuli. EEG with L slowing and R periodic discharges but no seizures    Recommendations:  [] SBP goal <160; SBP goal 100-160 given SDH  [x] c/w Keppra 500mg BID indefinitely for now given EEG results  [x] Hold antiplatelets given SDH; consider when cleared from nsx standpoint.  [x] Lipitor 80mg can be reduced to 40mg  [x] LDL 84, HbA1C 5.6  [x] Telemetry monitoring  [x] SBP goal normotensive, less than 160/90.   [x]VEEG reviewed   [x]TTE reviewed  [ ] MRI/MRA when able if in agreement with GOC of family  [x] LE dopplers: no DVT   [ ] GOC discussion pending, likely needs trach/PEG, son doesn't want surgery. f/u with family

## 2020-11-13 NOTE — CHART NOTE - NSCHARTNOTEFT_GEN_A_CORE
Nutrition Follow Up Note     Patient seen for: nutrition follow up on SICU    Source: medical record, communication with team. Pt intubated.    Chart reviewed, events noted. "80 year old female, with a medical history significant for dementia, not on A/C, BIBEMS unresponsive with head injury, reportedly fell, intubated in the ED to protect airway (GS=8), imaging studies showed left acute SDH, repeat CT after  shunt ligation shows stable SDH and left to right shift. CT head 4/11 revealed decreased subdural hematoma and midline shift 11mm-> 7 mm. New low density cerebellar focus suspicious for infarcts."    Diet Order: Diet, NPO with Tube Feed:   Tube Feeding Modality: Orogastric  Pivot 1.5 Micah (PIVOT1.5RTH)  Total Volume for 24 Hours (mL): 1320  Continuous  Starting Tube Feed Rate {mL per Hour}: 10  Until Goal Tube Feed Rate (mL per Hour): 55  Tube Feed Duration (in Hours): 24  Tube Feed Start Time: 09:00 (11-06-20 @ 08:57)    CURRENT EN ORDER PROVIDES:   - 1320 ml formula, 1980 calories (26 micah/kg), 124 grams protein (1.6 gm/kg), 1002 ml free water, based on dosing wt 75.2kg  - meets nutrition needs    Nutrition Events:   - Enteral Nutrition: Pivot @ 55 ml/hr x 24 hours infusing at 100% of goal > 6 days.  - Per chart, GOC pending for possible trach/PEG.  - Per chart, "Plan for possible hazel hole (HD10) if patients neuro status continues to improve- pending GOC."     Last BM: 11/12, 11/13. Bowel regimen: Miralax, senna    Anthropometric Measurements:   Height (cm): 162.5 (11-04-20 @ 07:00)  Weight (kg): 75.2 (11-03-20 @ 21:45)  Daily Weight (kg): 81.4 (11-08-20); 79.6 (11-13-20)  BMI (kg/m2): 28.5 (11-04-20 @ 07:00)  IBW: 54.4 kg    Medications: MEDICATIONS  (STANDING):  amLODIPine   Tablet 10 milliGRAM(s) Oral daily  atorvastatin 40 milliGRAM(s) Oral at bedtime  chlorhexidine 0.12% Liquid 15 milliLiter(s) Oral Mucosa every 12 hours  chlorhexidine 2% Cloths 1 Application(s) Topical <User Schedule>  enoxaparin Injectable 40 milliGRAM(s) SubCutaneous daily  lactulose Syrup 20 Gram(s) Enteral Tube three times a day  levETIRAcetam  Solution 500 milliGRAM(s) Enteral Tube two times a day  metoprolol tartrate 50 milliGRAM(s) Oral every 12 hours  nystatin Powder 1 Application(s) Topical two times a day  pantoprazole  Injectable 40 milliGRAM(s) IV Push daily  polyethylene glycol 3350 17 Gram(s) Oral two times a day  senna Syrup 10 milliLiter(s) Oral at bedtime    MEDICATIONS  (PRN):  acetaminophen    Suspension .. 975 milliGRAM(s) Oral every 6 hours PRN Mild Pain (1 - 3)  labetalol Injectable 10 milliGRAM(s) IV Push every 4 hours PRN SBP > 180    Labs: 11-13 @ 00:28: Sodium 146<H>, Potassium 4.4, Calcium 8.8, Magnesium 2.7<H>, Phosphorus 2.7, BUN 39<H>, Creatinine 0.46<L>, Glucose 178<H>, Hemoglobin 8.7<L>, Hematocrit 28.4<L>    Triglycerides, Serum: 115 mg/dL (11-05-20 @ 18:32)    Skin per nursing documentation: no pressure injuries documented  Edema: 2+ generalized, 3+ right/left arm, right/left hand    Estimated Needs: based on dosing wt 75.2kg with consideration for intubation  Energy: 2388-3087 calories (20-25 micah/kg)  Protein: 105-120 grams (1.4-1.6 gm/kg)  [place] Equation (REE): 1437 calories (19 micah/kg)    Previous Nutrition Diagnosis: Increased Nutrient Needs  Nutrition Diagnosis is: ongoing, addressed with EN at goal    New Nutrition Diagnosis: none    Recommended Interventions:   1. Continue Pivot1.5 @ 55 ml/hr x 24 hours to provide 1320 ml formula, 1980 calories (26 micah/kg), 124 grams protein (1.6 gm/kg), 1002 ml free water, based on dosing wt 75.2kg  2. Monitor GOC, plans for trach, PEG      Monitoring and Evaluation:   Continue to monitor nutrition provision and tolerance, weights, labs, skin integrity.   RD remains available upon request and will follow up per protocol.    Gina Saavedra MS RD CDN [facility]; Pager # [phone]

## 2020-11-13 NOTE — CHART NOTE - NSCHARTNOTEFT_GEN_A_CORE
Exam stably poor, ongoing GOC discussions including Dr. Johnson and family re: trach/peg. No neurosurgical intervention at this time. Palliative care goals per family.

## 2020-11-13 NOTE — PROGRESS NOTE ADULT - SUBJECTIVE AND OBJECTIVE BOX
Neurology Progress Note    S: Patient seen and examined. No new events overnight. no change    Medication:  acetaminophen    Suspension .. 975 milliGRAM(s) Oral every 6 hours PRN  amLODIPine   Tablet 10 milliGRAM(s) Oral daily  atorvastatin 40 milliGRAM(s) Oral at bedtime  chlorhexidine 0.12% Liquid 15 milliLiter(s) Oral Mucosa every 12 hours  chlorhexidine 2% Cloths 1 Application(s) Topical <User Schedule>  enoxaparin Injectable 40 milliGRAM(s) SubCutaneous daily  levETIRAcetam  Solution 500 milliGRAM(s) Enteral Tube two times a day  metoprolol tartrate 50 milliGRAM(s) Oral every 12 hours  nystatin Powder 1 Application(s) Topical two times a day  pantoprazole  Injectable 40 milliGRAM(s) IV Push daily  polyethylene glycol 3350 17 Gram(s) Oral two times a day  senna Syrup 10 milliLiter(s) Oral at bedtime      Vitals:  Vital Signs Last 24 Hrs  T(C): 37 (2020 15:00), Max: 37.1 (2020 11:00)  T(F): 98.6 (2020 15:00), Max: 98.8 (2020 11:00)  HR: 98 (2020 16:08) (79 - 106)  BP: 166/79 (2020 16:08) (155/73 - 200/98)  BP(mean): 114 (2020 16:08) (105 - 140)  RR: 21 (2020 16:08) (19 - 43)  SpO2: 100% (2020 16:08) (99% - 100%)    General Exam:   General Appearance: Appropriately dressed on vent in icu   Head: Normocephalic, atraumatic and no dysmorphic features  Ear, Nose, and Throat: Moist mucous membranes, c collar  CVS: S1S2+  Resp: intubated   Extremeties: No edema or cyanosis  Skin: No bruises or rashes     Neurological (>12):  MS: Intubated, not following commands, no response to vocal stimulation. Grimaces to sternal rub.   Language: Intubated.   CNs: (R = 2mm, L = 2mm) Poorly reactive, right eye exotropia.  No noted blink to threat. Corneal reflex intact. No facial asymmetry b/l.   Motor: Increased tone in the LLE in extension. no tremors at this time.  Sensory: Withdrawal to noxious stimuli in all 4 extremities LLE>RLE. Reduced withdrawal in the LLE.   Coordination: unable to test  Gait: Deferred     I personally reviewed the below data/images/labs:      CBC Full  -  ( 2020 00:28 )  WBC Count : 9.59 K/uL  RBC Count : 3.19 M/uL  Hemoglobin : 8.7 g/dL  Hematocrit : 28.4 %  Platelet Count - Automated : 488 K/uL  Mean Cell Volume : 89.0 fl  Mean Cell Hemoglobin : 27.3 pg  Mean Cell Hemoglobin Concentration : 30.6 gm/dL  Auto Neutrophil # : x  Auto Lymphocyte # : x  Auto Monocyte # : x  Auto Eosinophil # : x  Auto Basophil # : x  Auto Neutrophil % : x  Auto Lymphocyte % : x  Auto Monocyte % : x  Auto Eosinophil % : x  Auto Basophil % : x    11-13    146<H>  |  111<H>  |  39<H>  ----------------------------<  178<H>  4.4   |  26  |  0.46<L>    Ca    8.8      2020 00:28  Phos  2.7     11-13  Mg     2.7     11-13        PT/INR - ( 2020 00:28 )   PT: 14.3 sec;   INR: 1.20 ratio         PTT - ( 2020 00:28 )  PTT:32.9 sec  Urinalysis Basic - ( 2020 05:23 )    Color: Yellow / Appearance: Slightly Turbid / S.029 / pH: x  Gluc: x / Ketone: Trace  / Bili: Negative / Urobili: 3 mg/dL   Blood: x / Protein: 30 mg/dL / Nitrite: Negative   Leuk Esterase: Moderate / RBC: 7 /hpf / WBC 12 /HPF   Sq Epi: x / Non Sq Epi: 4 /hpf / Bacteria: Negative        < from: TTE with Doppler (w/Cont) (20 @ 10:30) >  OBSERVATIONS:  Mitral Valve: Mild mitral annular calcification. Mild  mitral regurgitation.  Aortic Root: The aortic root was not well seen.  Aortic Valve: The aortic valve is not visualized in short  axis imaging for anatomic evaluation.  There is no aortic stenosis. Peak transaortic valve  gradient equals 10 mm Hg, mean transaortic valve gradient  equals 5 mm Hg, aortic valve velocity time integral equals  30 cm. Mild aortic regurgitation.  Peak left ventricular  outflow tract gradient equals 3 mm Hg, mean gradient is  equal to 1 mm Hg, LVOT velocity time integral equals 17 cm.  Left Atrium: Mildly dilated left atrium.  LA volume index =  41 cc/m2.  Left Ventricle: Normal left ventricular systolic function.  No segmental wall motion abnormalities.  The LVEF= 55-60%.  Normal left ventricular size. Diastolic dysfunction with  elevated left atrial pressures.  Right Heart: Normal right atrial size.  The right atrial area= 14cm2, the right atrial volume=  35ml. Normal right ventricular size and systolic function.  Normal tricuspid valve. Minimal tricuspid regurgitation.  Pulmonic valve not well visualized.  Pericardium/PleuraThere is no pericardial effusion.  Hemodynamic: The estimated right atrial pressure is normal.  There is no shunt at the atrial level with agitated saline.  ------------------------------------------------------------------------  CONCLUSIONS:  1. There is no shunt at the atrial level with agitated  saline.  2. Diastolic dysfunction with elevated left atrial  pressures.  *** Limited echo windows, patient is intubated and supine.   Poor parasternal views.  No previous Echo exam.  ------------------------------------------------------------------------  PROCEDURE DESCRIPTION: Transthoracic echocardiogram with  2-D, M-Mode and complete spectral and color flow Doppler.  Patient was injected with 10 cc's of aerosolized saline.  Patient was injected with 10 cc's of aerosolized saline.  Verbal consent was obtained for injection of  Ultrasonic  Enhancing Agent following a discussion of risks and  benefits. Following intravenous injection of Ultrasonic  Enhancing Agent , harmonic imaging was performed.  ------------------------------------------------------------------------  ECHOCARDIOGRAPHIC EXAMINATION:  LEFT ATRIUM:  LA Volume Index: 41.00 cc/sqm  LA Volume: 74.6 cc  HR and BP:  HR: 90 bpm  BP: 191/86  BSA: 1.80  ------------------------------------------------------------------------  HEMODYNAMICS:  RA Pressure Estimate: 8  ------------------------------------------------------------------------  COLOR FLOW and SPECIAL DOPPLER:  LVOT:  LVOT Velocity: .8 M/sec  LVOT Peak Gradient Rest: 2 mm Hg  LVOT Mean Gradient Rest: 1mm Hg  ------------------------------------------------------------------------  DIASTOLIC FUNCTION:  DT:470 ms  E/A: 0.71  e' Septal: 5.0 cm/s  E/e' Septal: 14.2  e' Lateral: 4.0 cm/s  E/e' Lateral: 17.7  MV E wave: 0.7 m/s  MV A wave: 1.0 m/s  Septal a': 12.0 cm/s  Lateral a': 15.0 cm/s    < end of copied text >        EEG Classification / Summary:  Abnormal EEG study  Moderate background slowing, amplitudes reduced over the left.  Frequent runs of 1hz Left central max periodic epileptiform discharges were present.    -----------------------------------------------------------------------------------------------------    Clinical Impression:  High risk of focal onset seizures from the left paracentral region. Seizure prophylaxis recommended.  Moderate multifocal cerebral dysfunction, with fluid attenuation effect on the left.   Radiology (XR, CT, MR, U/S, TTE/JUNAID):    < from: CT Head No Cont (20 @ 13:13) >  FINDINGS:    Redemonstration of right parietal approach ventriculostomy catheter in unchanged position.    Acute left lateral convexity subdural hemorrhage measures 1.7 cm in greatest depth, decreased from 1.9 cm.    Acute right lateral convexity subdural hemorrhage measures 6 mm in greatest depth, similar to the prior examination.    Acute subdural hemorrhage in the interhemispheric fissure measures 9 mm in greatest thickness, previously measuring 11 mm    Acute subdural hemorrhage along the tentorial leaves is similar, measuring4 to 5 mm in greatest thickness on the left.    Rightward midline shift of 7 mm is decreased from 11 mm. Increased ventricular size, no large hydrocephalus. Basal cisterns are more well visualized on the current examination.    Similar nonspecific 6 mm focus of increased attenuation in the left corona radiata (2-22).    New foci of low density in the right inferior cerebellar hemisphere, suspicious for acute infarction. No CT evidence for hemorrhagic transformation.    Similar extensive white matter microvascular ischemic disease.    No displaced calvarial fracture. The visualized paranasal sinuses and mastoid air cells are clear.    IMPRESSION:  New foci of low density in the right inferior cerebellar hemisphere, suspicious for acute infarction. No CT evidence for hemorrhagic transformation.    Decreased size of left lateral convexity subdural hemorrhage. Similar size of right lateral convexity subdural hemorrhage.    Decreased size of interhemispheric subdural hemorrhage. Similar tentorial subdural hemorrhage.    Decreased rightward midline shift, currently 7 mm, previously 11 mm.    Increased ventricular size, no large hydrocephalus. Basal cisterns are more well visualized on the current examination.    < end of copied text >    < from: CT Head No Cont (20 @ 23:29) >  Bilateral supratentorial subdural hematomas are again identified. The subdural hematoma on the left side measures approximately 2.2 cm in widest diameter and on the right side measures approximately 0.5 cm in widest diameter. Acute subdural hematoma along the interhemispheric region is seen along the left side and measures approximately 0.9 cm widest diameter. Acute subdural hematomas along bilateral tentorial region are again seen as well. There is mass effect on left lateral ventricle. Left-to-right shift (1.1 cm) is again seen.    Right parietal shunt catheter is again seen. This catheter appears unchanged in position.    Evaluation of the osseous structures with the appropriate window appears normal    The visualized paranasal sinuses mastoid and middle ear regions appear clear.    IMPRESSION: No stiffing change when allowing for differences in technique.    < end of copied text >    EEG Classification / Summary:  Abnormal EEG study  Moderate background slowing, amplitudes reduced over the left.  Intermittent 0.5 to 1hz right periodic discharges, higher amplitudes over the right    -----------------------------------------------------------------------------------------------------    Clinical Impression:  Risk of focal onset seizures from the frontal regions.   Left hemispheric structural or functional abnormality  Moderate multifocal cerebral dysfunction, with fluid attenuation effect on the left.       11-10-20  Clinical Impression:  Risk of focal onset seizures from the frontal regions.   Left hemispheric structural or functional abnormality  Moderate multifocal cerebral dysfunction, with fluid attenuation effect on the left.   No change vs prior day.  No seizures x 3 days

## 2020-11-13 NOTE — PROGRESS NOTE ADULT - ASSESSMENT
80 year old female, with a medical history significant for dementia, not on A/C, BIBEMS unresponsive with head injury, reportedly fell, intubated in the ED to protect airway (GS=8), imaging studies showed left acute SDH, repeat CT after  shunt ligation shows stable SDH and left to right shift. CT head 4/11 revealed decreased subdural hematoma and midline shift 11mm-> 7 mm. New low density cerebellar focus suspicious for infarcts. Pt neuro status improved, following commands and opens the eyes spontaneously.      Plan:   - Pain control   - c/w intubation and mechanical ventilation.   - Now off all sedation  - Recommend trach and PEG, pending discussion.  - Appreciate neurosurgery:  Conservative management.   - Follow up with family meeting discussing of GOC today  - Appreciate SICU care  - DNR    ACS  [id number]

## 2020-11-14 LAB
ANION GAP SERPL CALC-SCNC: 9 MMOL/L — SIGNIFICANT CHANGE UP (ref 5–17)
APTT BLD: 32.3 SEC — SIGNIFICANT CHANGE UP (ref 27.5–35.5)
BUN SERPL-MCNC: 36 MG/DL — HIGH (ref 7–23)
CALCIUM SERPL-MCNC: 8.9 MG/DL — SIGNIFICANT CHANGE UP (ref 8.4–10.5)
CHLORIDE SERPL-SCNC: 109 MMOL/L — HIGH (ref 96–108)
CO2 SERPL-SCNC: 25 MMOL/L — SIGNIFICANT CHANGE UP (ref 22–31)
CREAT SERPL-MCNC: 0.46 MG/DL — LOW (ref 0.5–1.3)
GLUCOSE SERPL-MCNC: 186 MG/DL — HIGH (ref 70–99)
INR BLD: 1.21 RATIO — HIGH (ref 0.88–1.16)
MAGNESIUM SERPL-MCNC: 2.6 MG/DL — SIGNIFICANT CHANGE UP (ref 1.6–2.6)
PHOSPHATE SERPL-MCNC: 2.7 MG/DL — SIGNIFICANT CHANGE UP (ref 2.5–4.5)
POTASSIUM SERPL-MCNC: 4.1 MMOL/L — SIGNIFICANT CHANGE UP (ref 3.5–5.3)
POTASSIUM SERPL-SCNC: 4.1 MMOL/L — SIGNIFICANT CHANGE UP (ref 3.5–5.3)
PROTHROM AB SERPL-ACNC: 14.4 SEC — HIGH (ref 10.6–13.6)
SODIUM SERPL-SCNC: 143 MMOL/L — SIGNIFICANT CHANGE UP (ref 135–145)

## 2020-11-14 PROCEDURE — 99232 SBSQ HOSP IP/OBS MODERATE 35: CPT

## 2020-11-14 PROCEDURE — 71045 X-RAY EXAM CHEST 1 VIEW: CPT | Mod: 26

## 2020-11-14 RX ORDER — LABETALOL HCL 100 MG
10 TABLET ORAL ONCE
Refills: 0 | Status: COMPLETED | OUTPATIENT
Start: 2020-11-14 | End: 2020-11-14

## 2020-11-14 RX ADMIN — PANTOPRAZOLE SODIUM 40 MILLIGRAM(S): 20 TABLET, DELAYED RELEASE ORAL at 11:40

## 2020-11-14 RX ADMIN — Medication 50 MILLIGRAM(S): at 17:33

## 2020-11-14 RX ADMIN — CHLORHEXIDINE GLUCONATE 15 MILLILITER(S): 213 SOLUTION TOPICAL at 05:03

## 2020-11-14 RX ADMIN — POLYETHYLENE GLYCOL 3350 17 GRAM(S): 17 POWDER, FOR SOLUTION ORAL at 17:33

## 2020-11-14 RX ADMIN — Medication 250 MILLIMOLE(S): at 02:58

## 2020-11-14 RX ADMIN — LEVETIRACETAM 500 MILLIGRAM(S): 250 TABLET, FILM COATED ORAL at 05:03

## 2020-11-14 RX ADMIN — NYSTATIN CREAM 1 APPLICATION(S): 100000 CREAM TOPICAL at 05:03

## 2020-11-14 RX ADMIN — AMLODIPINE BESYLATE 10 MILLIGRAM(S): 2.5 TABLET ORAL at 05:03

## 2020-11-14 RX ADMIN — SENNA PLUS 10 MILLILITER(S): 8.6 TABLET ORAL at 21:11

## 2020-11-14 RX ADMIN — Medication 10 MILLIGRAM(S): at 22:21

## 2020-11-14 RX ADMIN — LEVETIRACETAM 500 MILLIGRAM(S): 250 TABLET, FILM COATED ORAL at 17:33

## 2020-11-14 RX ADMIN — ATORVASTATIN CALCIUM 40 MILLIGRAM(S): 80 TABLET, FILM COATED ORAL at 21:11

## 2020-11-14 RX ADMIN — Medication 50 MILLIGRAM(S): at 05:03

## 2020-11-14 RX ADMIN — ENOXAPARIN SODIUM 40 MILLIGRAM(S): 100 INJECTION SUBCUTANEOUS at 11:41

## 2020-11-14 RX ADMIN — CHLORHEXIDINE GLUCONATE 15 MILLILITER(S): 213 SOLUTION TOPICAL at 17:33

## 2020-11-14 RX ADMIN — CHLORHEXIDINE GLUCONATE 1 APPLICATION(S): 213 SOLUTION TOPICAL at 05:01

## 2020-11-14 RX ADMIN — NYSTATIN CREAM 1 APPLICATION(S): 100000 CREAM TOPICAL at 17:33

## 2020-11-14 NOTE — PROGRESS NOTE ADULT - SUBJECTIVE AND OBJECTIVE BOX
No acute events reported over the past 24hrs    Patient seen and examined at bedside. Feeling well.   Pain is well controlled.  Tolerating diet without  nausea or vomiting. + Flatus, + BM      PHYSICAL EXAM:  General: intubated, appears to be in NAD.   Chest: Intubated on mechanical ventilation.   Neuro: Occasionally following commands, with spontaneous movement of extremities.   Abdomen: soft, nontender, nondistended   Extremities: well perfused      Vital Signs Last 24 Hrs  T(C): 37.4 (14 Nov 2020 11:00), Max: 37.4 (14 Nov 2020 11:00)  T(F): 99.3 (14 Nov 2020 11:00), Max: 99.3 (14 Nov 2020 11:00)  HR: 80 (14 Nov 2020 12:28) (75 - 101)  BP: 166/74 (14 Nov 2020 12:00) (142/66 - 172/80)  BP(mean): 107 (14 Nov 2020 12:00) (94 - 122)  RR: 26 (14 Nov 2020 12:00) (17 - 29)  SpO2: 99% (14 Nov 2020 12:28) (99% - 100%)    I&O's Detail    13 Nov 2020 07:01  -  14 Nov 2020 07:00  --------------------------------------------------------  IN:    Enteral Tube Flush: 120 mL    IV PiggyBack: 250 mL    Pivot 1.5: 1320 mL  Total IN: 1690 mL    OUT:    Incontinent per Collection Bag (mL): 1200 mL  Total OUT: 1200 mL    Total NET: 490 mL      14 Nov 2020 07:01  -  14 Nov 2020 13:00  --------------------------------------------------------  IN:    Pivot 1.5: 275 mL  Total IN: 275 mL    OUT:  Total OUT: 0 mL    Total NET: 275 mL          11-13    143  |  109<H>  |  36<H>  ----------------------------<  186<H>  4.1   |  25  |  0.46<L>    Ca    8.9      13 Nov 2020 23:48  Phos  2.7     11-13  Mg     2.6     11-13                              8.6    10.39 )-----------( 481      ( 13 Nov 2020 23:48 )             28.0       PT/INR - ( 13 Nov 2020 23:48 )   PT: 14.4 sec;   INR: 1.21 ratio         PTT - ( 13 Nov 2020 23:48 )  PTT:32.3 sec    LABS:                         8.6    10.39 )-----------( 481      ( 13 Nov 2020 23:48 )             28.0     11-13    143  |  109<H>  |  36<H>  ----------------------------<  186<H>  4.1   |  25  |  0.46<L>    Ca    8.9      13 Nov 2020 23:48  Phos  2.7     11-13  Mg     2.6     11-13      PT/INR - ( 13 Nov 2020 23:48 )   PT: 14.4 sec;   INR: 1.21 ratio         PTT - ( 13 Nov 2020 23:48 )  PTT:32.3 sec    MEDICATIONS  (STANDING):  amLODIPine   Tablet 10 milliGRAM(s) Oral daily  atorvastatin 40 milliGRAM(s) Oral at bedtime  chlorhexidine 0.12% Liquid 15 milliLiter(s) Oral Mucosa every 12 hours  chlorhexidine 2% Cloths 1 Application(s) Topical <User Schedule>  enoxaparin Injectable 40 milliGRAM(s) SubCutaneous daily  levETIRAcetam  Solution 500 milliGRAM(s) Enteral Tube two times a day  metoprolol tartrate 50 milliGRAM(s) Oral every 12 hours  nystatin Powder 1 Application(s) Topical two times a day  pantoprazole  Injectable 40 milliGRAM(s) IV Push daily  polyethylene glycol 3350 17 Gram(s) Oral two times a day  senna Syrup 10 milliLiter(s) Oral at bedtime    MEDICATIONS  (PRN):  acetaminophen    Suspension .. 975 milliGRAM(s) Oral every 6 hours PRN Mild Pain (1 - 3)

## 2020-11-14 NOTE — PROGRESS NOTE ADULT - ATTENDING COMMENTS
Patient seen and examined on AM SICU rounds  Neurologically remains with poor mental status  Hemodynamically / Respiratory stable  Family has discussed poor prognosis with neurosurgery, is seeking outside neurosurgical opinions

## 2020-11-14 NOTE — PROGRESS NOTE ADULT - SUBJECTIVE AND OBJECTIVE BOX
Neurology Progress Note    S: Patient seen and examined. No new events overnight. no change in exam. off mittens, BP better controlled     Medication:  acetaminophen    Suspension .. 975 milliGRAM(s) Oral every 6 hours PRN  amLODIPine   Tablet 10 milliGRAM(s) Oral daily  atorvastatin 40 milliGRAM(s) Oral at bedtime  chlorhexidine 0.12% Liquid 15 milliLiter(s) Oral Mucosa every 12 hours  chlorhexidine 2% Cloths 1 Application(s) Topical <User Schedule>  enoxaparin Injectable 40 milliGRAM(s) SubCutaneous daily  levETIRAcetam  Solution 500 milliGRAM(s) Enteral Tube two times a day  metoprolol tartrate 50 milliGRAM(s) Oral every 12 hours  nystatin Powder 1 Application(s) Topical two times a day  pantoprazole  Injectable 40 milliGRAM(s) IV Push daily  polyethylene glycol 3350 17 Gram(s) Oral two times a day  senna Syrup 10 milliLiter(s) Oral at bedtime      Vitals:  Vital Signs Last 24 Hrs  T(C): 37 (2020 15:00), Max: 37.1 (2020 11:00)  T(F): 98.6 (2020 15:00), Max: 98.8 (2020 11:00)  HR: 98 (2020 16:08) (79 - 106)  BP: 166/79 (2020 16:08) (155/73 - 200/98)  BP(mean): 114 (2020 16:08) (105 - 140)  RR: 21 (2020 16:08) (19 - 43)  SpO2: 100% (2020 16:08) (99% - 100%)    General Exam:   General Appearance: Appropriately dressed on vent in icu   Head: Normocephalic, atraumatic and no dysmorphic features  Ear, Nose, and Throat: Moist mucous membranes, c collar  CVS: S1S2+  Resp: intubated   Extremeties: No edema or cyanosis  Skin: No bruises or rashes     Neurological (>12):  MS: Intubated, not following commands, no response to vocal stimulation. Grimaces to sternal rub.   Language: Intubated.   CNs: (R = 2mm, L = 2mm) Poorly reactive, right eye exotropia.  No noted blink to threat. Corneal reflex intact. No facial asymmetry b/l.   Motor: Increased tone in the LLE in extension. no tremors at this time.  Sensory: Withdrawal to noxious stimuli in all 4 extremities LLE>RLE. Reduced withdrawal in the LLE.   Coordination: unable to test  Gait: Deferred     I personally reviewed the below data/images/labs:      CBC Full  -  ( 2020 00:28 )  WBC Count : 9.59 K/uL  RBC Count : 3.19 M/uL  Hemoglobin : 8.7 g/dL  Hematocrit : 28.4 %  Platelet Count - Automated : 488 K/uL  Mean Cell Volume : 89.0 fl  Mean Cell Hemoglobin : 27.3 pg  Mean Cell Hemoglobin Concentration : 30.6 gm/dL  Auto Neutrophil # : x  Auto Lymphocyte # : x  Auto Monocyte # : x  Auto Eosinophil # : x  Auto Basophil # : x  Auto Neutrophil % : x  Auto Lymphocyte % : x  Auto Monocyte % : x  Auto Eosinophil % : x  Auto Basophil % : x    11-13    146<H>  |  111<H>  |  39<H>  ----------------------------<  178<H>  4.4   |  26  |  0.46<L>    Ca    8.8      2020 00:28  Phos  2.7     11-13  Mg     2.7     11-13        PT/INR - ( 2020 00:28 )   PT: 14.3 sec;   INR: 1.20 ratio         PTT - ( 2020 00:28 )  PTT:32.9 sec  Urinalysis Basic - ( 2020 05:23 )    Color: Yellow / Appearance: Slightly Turbid / S.029 / pH: x  Gluc: x / Ketone: Trace  / Bili: Negative / Urobili: 3 mg/dL   Blood: x / Protein: 30 mg/dL / Nitrite: Negative   Leuk Esterase: Moderate / RBC: 7 /hpf / WBC 12 /HPF   Sq Epi: x / Non Sq Epi: 4 /hpf / Bacteria: Negative        < from: TTE with Doppler (w/Cont) (20 @ 10:30) >  OBSERVATIONS:  Mitral Valve: Mild mitral annular calcification. Mild  mitral regurgitation.  Aortic Root: The aortic root was not well seen.  Aortic Valve: The aortic valve is not visualized in short  axis imaging for anatomic evaluation.  There is no aortic stenosis. Peak transaortic valve  gradient equals 10 mm Hg, mean transaortic valve gradient  equals 5 mm Hg, aortic valve velocity time integral equals  30 cm. Mild aortic regurgitation.  Peak left ventricular  outflow tract gradient equals 3 mm Hg, mean gradient is  equal to 1 mm Hg, LVOT velocity time integral equals 17 cm.  Left Atrium: Mildly dilated left atrium.  LA volume index =  41 cc/m2.  Left Ventricle: Normal left ventricular systolic function.  No segmental wall motion abnormalities.  The LVEF= 55-60%.  Normal left ventricular size. Diastolic dysfunction with  elevated left atrial pressures.  Right Heart: Normal right atrial size.  The right atrial area= 14cm2, the right atrial volume=  35ml. Normal right ventricular size and systolic function.  Normal tricuspid valve. Minimal tricuspid regurgitation.  Pulmonic valve not well visualized.  Pericardium/PleuraThere is no pericardial effusion.  Hemodynamic: The estimated right atrial pressure is normal.  There is no shunt at the atrial level with agitated saline.  ------------------------------------------------------------------------  CONCLUSIONS:  1. There is no shunt at the atrial level with agitated  saline.  2. Diastolic dysfunction with elevated left atrial  pressures.  *** Limited echo windows, patient is intubated and supine.   Poor parasternal views.  No previous Echo exam.  ------------------------------------------------------------------------  PROCEDURE DESCRIPTION: Transthoracic echocardiogram with  2-D, M-Mode and complete spectral and color flow Doppler.  Patient was injected with 10 cc's of aerosolized saline.  Patient was injected with 10 cc's of aerosolized saline.  Verbal consent was obtained for injection of  Ultrasonic  Enhancing Agent following a discussion of risks and  benefits. Following intravenous injection of Ultrasonic  Enhancing Agent , harmonic imaging was performed.  ------------------------------------------------------------------------  ECHOCARDIOGRAPHIC EXAMINATION:  LEFT ATRIUM:  LA Volume Index: 41.00 cc/sqm  LA Volume: 74.6 cc  HR and BP:  HR: 90 bpm  BP: 191/86  BSA: 1.80  ------------------------------------------------------------------------  HEMODYNAMICS:  RA Pressure Estimate: 8  ------------------------------------------------------------------------  COLOR FLOW and SPECIAL DOPPLER:  LVOT:  LVOT Velocity: .8 M/sec  LVOT Peak Gradient Rest: 2 mm Hg  LVOT Mean Gradient Rest: 1mm Hg  ------------------------------------------------------------------------  DIASTOLIC FUNCTION:  DT:470 ms  E/A: 0.71  e' Septal: 5.0 cm/s  E/e' Septal: 14.2  e' Lateral: 4.0 cm/s  E/e' Lateral: 17.7  MV E wave: 0.7 m/s  MV A wave: 1.0 m/s  Septal a': 12.0 cm/s  Lateral a': 15.0 cm/s    < end of copied text >        EEG Classification / Summary:  Abnormal EEG study  Moderate background slowing, amplitudes reduced over the left.  Frequent runs of 1hz Left central max periodic epileptiform discharges were present.    -----------------------------------------------------------------------------------------------------    Clinical Impression:  High risk of focal onset seizures from the left paracentral region. Seizure prophylaxis recommended.  Moderate multifocal cerebral dysfunction, with fluid attenuation effect on the left.   Radiology (XR, CT, MR, U/S, TTE/JUNAID):    < from: CT Head No Cont (20 @ 13:13) >  FINDINGS:    Redemonstration of right parietal approach ventriculostomy catheter in unchanged position.    Acute left lateral convexity subdural hemorrhage measures 1.7 cm in greatest depth, decreased from 1.9 cm.    Acute right lateral convexity subdural hemorrhage measures 6 mm in greatest depth, similar to the prior examination.    Acute subdural hemorrhage in the interhemispheric fissure measures 9 mm in greatest thickness, previously measuring 11 mm    Acute subdural hemorrhage along the tentorial leaves is similar, measuring4 to 5 mm in greatest thickness on the left.    Rightward midline shift of 7 mm is decreased from 11 mm. Increased ventricular size, no large hydrocephalus. Basal cisterns are more well visualized on the current examination.    Similar nonspecific 6 mm focus of increased attenuation in the left corona radiata (2-22).    New foci of low density in the right inferior cerebellar hemisphere, suspicious for acute infarction. No CT evidence for hemorrhagic transformation.    Similar extensive white matter microvascular ischemic disease.    No displaced calvarial fracture. The visualized paranasal sinuses and mastoid air cells are clear.    IMPRESSION:  New foci of low density in the right inferior cerebellar hemisphere, suspicious for acute infarction. No CT evidence for hemorrhagic transformation.    Decreased size of left lateral convexity subdural hemorrhage. Similar size of right lateral convexity subdural hemorrhage.    Decreased size of interhemispheric subdural hemorrhage. Similar tentorial subdural hemorrhage.    Decreased rightward midline shift, currently 7 mm, previously 11 mm.    Increased ventricular size, no large hydrocephalus. Basal cisterns are more well visualized on the current examination.    < end of copied text >    < from: CT Head No Cont (20 @ 23:29) >  Bilateral supratentorial subdural hematomas are again identified. The subdural hematoma on the left side measures approximately 2.2 cm in widest diameter and on the right side measures approximately 0.5 cm in widest diameter. Acute subdural hematoma along the interhemispheric region is seen along the left side and measures approximately 0.9 cm widest diameter. Acute subdural hematomas along bilateral tentorial region are again seen as well. There is mass effect on left lateral ventricle. Left-to-right shift (1.1 cm) is again seen.    Right parietal shunt catheter is again seen. This catheter appears unchanged in position.    Evaluation of the osseous structures with the appropriate window appears normal    The visualized paranasal sinuses mastoid and middle ear regions appear clear.    IMPRESSION: No stiffing change when allowing for differences in technique.    < end of copied text >    EEG Classification / Summary:  Abnormal EEG study  Moderate background slowing, amplitudes reduced over the left.  Intermittent 0.5 to 1hz right periodic discharges, higher amplitudes over the right    -----------------------------------------------------------------------------------------------------    Clinical Impression:  Risk of focal onset seizures from the frontal regions.   Left hemispheric structural or functional abnormality  Moderate multifocal cerebral dysfunction, with fluid attenuation effect on the left.       11-10-20  Clinical Impression:  Risk of focal onset seizures from the frontal regions.   Left hemispheric structural or functional abnormality  Moderate multifocal cerebral dysfunction, with fluid attenuation effect on the left.   No change vs prior day.  No seizures x 3 days

## 2020-11-14 NOTE — PROGRESS NOTE ADULT - SUBJECTIVE AND OBJECTIVE BOX
HISTORY  80F hx of dementia, hydrocephalus s/p  shunt and recent hip fracture non-operative management at SNF presents from SNF after a mechanical fall, level I trauma activated, primary survey significant for GCS 8, decision was made to intubate in trauma bay. Secondary survey significant for L forehead hematoma, R lower abdomen abrasion. CT scan significant for large L SDH with midline shift and L 8-10 rib fx. NSGY and family discussed, plan for non-operative management at this time. Patient was transferred to SICU for vent management and hemodynamic monitoring.    NSGY ligated  shunt at bedside. Repeat CT head was stable. Patient was briefly started on a cardene gtt for hypertensive urgency, goal SBP < 180. UA+ started on meropenem. Plan per NSGY to continue conservative management with potential hazel hole in future. KO tube feeds started on 11/4. Metoprolol 25Q12 started, Labetalol Q4PRN for HTN. Due to pcn allergy meropenum given for UTI than switched to Cefepime then to Ceftriaxone.    24 HOUR EVENTS:  -Pt changed from CPAP to PRVC after increased WOB on CPAP    SUBJECTIVE/ROS:  [ ] A ten-point review of systems was otherwise negative except as noted.  [X] Due to altered mental status/intubation, subjective information were not able to be obtained from the patient. History was obtained, to the extent possible, from review of the chart and collateral sources of information.      NEURO  Exam: Unchanged from previous. Opens eyes spontaneously, responsive pupils b/l, positive gag, moves LLE spontanous, withdraws all four extremities to pain  Meds: acetaminophen    Suspension .. 975 milliGRAM(s) Oral every 6 hours PRN Mild Pain (1 - 3)  levETIRAcetam  Solution 500 milliGRAM(s) Enteral Tube two times a day    [x] Adequacy of sedation and pain control has been assessed and adjusted      RESPIRATORY  RR: 24 (11-13-20 @ 23:00) (17 - 43)  SpO2: 100% (11-14-20 @ 00:01) (99% - 100%)  Wt(kg): --  Exam: CTA B/L  Mechanical Ventilation: Mode: AC/ CMV (Assist Control/ Continuous Mandatory Ventilation), RR (machine): 12, RR (patient): 20, TV (machine): 400, FiO2: 30, PEEP: 5, ITime: 1, MAP: 6, PIP: 10    [ ] Extubation Readiness Assessed  Meds:       CARDIOVASCULAR  HR: 83 (11-14-20 @ 00:01) (78 - 106)  BP: 164/98 (11-13-20 @ 23:00) (142/66 - 200/98)  BP(mean): 122 (11-13-20 @ 23:00) (95 - 140)  ABP: --  ABP(mean): --  Wt(kg): --  CVP(cm H2O): --      Exam:  Cardiac Rhythm:RRR  Perfusion     [X]Adequate   [ ]Inadequate  Mentation   [X]Normal       [ ]Reduced  Extremities  [X]Warm         [ ]Cool  Volume Status [ ]Hypervolemic [X]Euvolemic [ ]Hypovolemic  Meds: amLODIPine   Tablet 10 milliGRAM(s) Oral daily  metoprolol tartrate 50 milliGRAM(s) Oral every 12 hours        GI/NUTRITION  Exam:   Diet:  Meds: pantoprazole  Injectable 40 milliGRAM(s) IV Push daily  polyethylene glycol 3350 17 Gram(s) Oral two times a day  senna Syrup 10 milliLiter(s) Oral at bedtime      GENITOURINARY  I&O's Detail    11-12 @ 07:01  -  11-13 @ 07:00  --------------------------------------------------------  IN:    Enteral Tube Flush: 100 mL    IV PiggyBack: 250 mL    Pivot 1.5: 1320 mL  Total IN: 1670 mL    OUT:    Incontinent per Collection Bag (mL): 1075 mL  Total OUT: 1075 mL    Total NET: 595 mL      11-13 @ 07:01  -  11-14 @ 01:36  --------------------------------------------------------  IN:    Enteral Tube Flush: 120 mL    Pivot 1.5: 880 mL  Total IN: 1000 mL    OUT:    Incontinent per Collection Bag (mL): 900 mL  Total OUT: 900 mL    Total NET: 100 mL          11-13    143  |  109<H>  |  36<H>  ----------------------------<  186<H>  4.1   |  25  |  0.46<L>    Ca    8.9      13 Nov 2020 23:48  Phos  2.7     11-13  Mg     2.6     11-13      [ ] Carrasco catheter, indication:   Meds:       HEMATOLOGIC  Meds: enoxaparin Injectable 40 milliGRAM(s) SubCutaneous daily    [x] VTE Prophylaxis                        8.6    10.39 )-----------( 481      ( 13 Nov 2020 23:48 )             28.0     PT/INR - ( 13 Nov 2020 23:48 )   PT: 14.4 sec;   INR: 1.21 ratio         PTT - ( 13 Nov 2020 23:48 )  PTT:32.3 sec  Transfusion     [ ] PRBC   [ ] Platelets   [ ] FFP   [ ] Cryoprecipitate      INFECTIOUS DISEASES  T(C): 36.5 (11-13-20 @ 23:00), Max: 37.1 (11-13-20 @ 11:00)  Wt(kg): --  WBC Count: 10.39 K/uL (11-13 @ 23:48)    Recent Cultures:    Meds:       ENDOCRINE  Capillary Blood Glucose    Meds: atorvastatin 40 milliGRAM(s) Oral at bedtime        ACCESS DEVICES:  [x] Peripheral IV  [ ] Central Venous Line	[ ] R	[ ] L	[ ] IJ	[ ] Fem	[ ] SC	Placed:   [ ] Arterial Line		[ ] R	[ ] L	[ ] Fem	[ ] Rad	[ ] Ax	Placed:   [ ] PICC:					[ ] Mediport  [ ] Urinary Catheter, Date Placed:   [x] Necessity of urinary, arterial, and venous catheters discussed    OTHER MEDICATIONS:  chlorhexidine 0.12% Liquid 15 milliLiter(s) Oral Mucosa every 12 hours  chlorhexidine 2% Cloths 1 Application(s) Topical <User Schedule>  nystatin Powder 1 Application(s) Topical two times a day      CODE STATUS: Yes    IMAGING: HISTORY  80F hx of dementia, hydrocephalus s/p  shunt and recent hip fracture non-operative management at SNF presents from SNF after a mechanical fall, level I trauma activated, primary survey significant for GCS 8, decision was made to intubate in trauma bay. Secondary survey significant for L forehead hematoma, R lower abdomen abrasion. CT scan significant for large L SDH with midline shift and L 8-10 rib fx. NSGY and family discussed, plan for non-operative management at this time. Patient was transferred to SICU for vent management and hemodynamic monitoring.    NSGY ligated  shunt at bedside. Repeat CT head was stable. Patient was briefly started on a cardene gtt for hypertensive urgency, goal SBP < 180. UA+ started on meropenem. Plan per NSGY to continue conservative management with potential hazel hole in future. KO tube feeds started on 11/4. Metoprolol 25Q12 started, Labetalol Q4PRN for HTN. Due to pcn allergy meropenum given for UTI than switched to Cefepime then to Ceftriaxone.    24 HOUR EVENTS:  - No acute events    SUBJECTIVE/ROS:  [ ] A ten-point review of systems was otherwise negative except as noted.  [X] Due to altered mental status/intubation, subjective information were not able to be obtained from the patient. History was obtained, to the extent possible, from review of the chart and collateral sources of information.      NEURO  Exam: Unchanged from previous. Opens eyes spontaneously, responsive pupils b/l, positive gag, moves LLE spontanous, withdraws all four extremities to pain  Meds: acetaminophen    Suspension .. 975 milliGRAM(s) Oral every 6 hours PRN Mild Pain (1 - 3)  levETIRAcetam  Solution 500 milliGRAM(s) Enteral Tube two times a day    [x] Adequacy of sedation and pain control has been assessed and adjusted      RESPIRATORY  RR: 24 (11-13-20 @ 23:00) (17 - 43)  SpO2: 100% (11-14-20 @ 00:01) (99% - 100%)  Wt(kg): --  Exam: CTA B/L  Mechanical Ventilation: Mode: AC/ CMV (Assist Control/ Continuous Mandatory Ventilation), RR (machine): 12, RR (patient): 20, TV (machine): 400, FiO2: 30, PEEP: 5, ITime: 1, MAP: 6, PIP: 10    [ ] Extubation Readiness Assessed  Meds:       CARDIOVASCULAR  HR: 83 (11-14-20 @ 00:01) (78 - 106)  BP: 164/98 (11-13-20 @ 23:00) (142/66 - 200/98)  BP(mean): 122 (11-13-20 @ 23:00) (95 - 140)  ABP: --  ABP(mean): --  Wt(kg): --  CVP(cm H2O): --      Exam:  Cardiac Rhythm:RRR  Perfusion     [X]Adequate   [ ]Inadequate  Mentation   [X]Normal       [ ]Reduced  Extremities  [X]Warm         [ ]Cool  Volume Status [ ]Hypervolemic [X]Euvolemic [ ]Hypovolemic  Meds: amLODIPine   Tablet 10 milliGRAM(s) Oral daily  metoprolol tartrate 50 milliGRAM(s) Oral every 12 hours        GI/NUTRITION  Exam:   Diet:  Meds: pantoprazole  Injectable 40 milliGRAM(s) IV Push daily  polyethylene glycol 3350 17 Gram(s) Oral two times a day  senna Syrup 10 milliLiter(s) Oral at bedtime      GENITOURINARY  I&O's Detail    11-12 @ 07:01  -  11-13 @ 07:00  --------------------------------------------------------  IN:    Enteral Tube Flush: 100 mL    IV PiggyBack: 250 mL    Pivot 1.5: 1320 mL  Total IN: 1670 mL    OUT:    Incontinent per Collection Bag (mL): 1075 mL  Total OUT: 1075 mL    Total NET: 595 mL      11-13 @ 07:01  -  11-14 @ 01:36  --------------------------------------------------------  IN:    Enteral Tube Flush: 120 mL    Pivot 1.5: 880 mL  Total IN: 1000 mL    OUT:    Incontinent per Collection Bag (mL): 900 mL  Total OUT: 900 mL    Total NET: 100 mL          11-13    143  |  109<H>  |  36<H>  ----------------------------<  186<H>  4.1   |  25  |  0.46<L>    Ca    8.9      13 Nov 2020 23:48  Phos  2.7     11-13  Mg     2.6     11-13      [ ] Carrasco catheter, indication:   Meds:       HEMATOLOGIC  Meds: enoxaparin Injectable 40 milliGRAM(s) SubCutaneous daily    [x] VTE Prophylaxis                        8.6    10.39 )-----------( 481      ( 13 Nov 2020 23:48 )             28.0     PT/INR - ( 13 Nov 2020 23:48 )   PT: 14.4 sec;   INR: 1.21 ratio         PTT - ( 13 Nov 2020 23:48 )  PTT:32.3 sec  Transfusion     [ ] PRBC   [ ] Platelets   [ ] FFP   [ ] Cryoprecipitate      INFECTIOUS DISEASES  T(C): 36.5 (11-13-20 @ 23:00), Max: 37.1 (11-13-20 @ 11:00)  Wt(kg): --  WBC Count: 10.39 K/uL (11-13 @ 23:48)    Recent Cultures:    Meds:       ENDOCRINE  Capillary Blood Glucose    Meds: atorvastatin 40 milliGRAM(s) Oral at bedtime        ACCESS DEVICES:  [x] Peripheral IV  [ ] Central Venous Line	[ ] R	[ ] L	[ ] IJ	[ ] Fem	[ ] SC	Placed:   [ ] Arterial Line		[ ] R	[ ] L	[ ] Fem	[ ] Rad	[ ] Ax	Placed:   [ ] PICC:					[ ] Mediport  [ ] Urinary Catheter, Date Placed:   [x] Necessity of urinary, arterial, and venous catheters discussed    OTHER MEDICATIONS:  chlorhexidine 0.12% Liquid 15 milliLiter(s) Oral Mucosa every 12 hours  chlorhexidine 2% Cloths 1 Application(s) Topical <User Schedule>  nystatin Powder 1 Application(s) Topical two times a day      CODE STATUS: Yes    IMAGING:

## 2020-11-14 NOTE — PROGRESS NOTE ADULT - ASSESSMENT
80F hx of dementia, hydrocephalus s/p  shunt and recent hip fracture non-operative management at SNF presents from SNF after a mechanical fall, level I trauma activated, primary survey significant for GCS 8, decision was made to intubate in trauma bay. Secondary survey significant for L forehead hematoma, R lower abdomen abrasion. CT scan significant for large L SDH with midline shift and L 8-10 rib fx. NSGY and family discussed, plan for non-operative management at this time. CT head 4/11 revealed decreased subdural hematoma and midline shift 11mm-> 7 mm. New low density cerebellar focus suspicious for infarcts.   Ongoing GOC discussion, no neurosurgery planned at this time.    PLAN:  Neuro: s/p  shunt ligation at level of clavicle  - q4h neurochecks  - CT head 11/9 increased size of ventricles and hemorrhage layering in occipital horns, similar midline shift and subdural hemorrhage  - No current plans for neurosurgical intervention  - EEG with focal area of high seizure risk, keppra 500mg BID for seizure prophylaxis  - Off all sedation; PO tylenol, lidoderm  - f/u discussions with family and neurosurgery team    Resp: L 8-10 rib fx  - Intubated, PRVC 400/12/5/30%  - Trach planning pending GOC discussions   - Daily CXR    CV: Goal SBP <200  - Home atorvastatin  - Metoprolol 50mg Q12  - Amlodipine 10mg daily  - Labetalol 10mg IV Q4 PRN    GI: no acute issues  - Tube feeds pivot @ 55  - Liver mass (+ renal mass) finding discussed with son   - Discussion of PEG pending GOC   - Protonix for stress ulcer prophylaxis  - miralax/senna/lactulose    : page removed 11/9, passed TOV  - Monitor I/Os, UOP    Heme: acute anemia  - Lovenox VTE ppx started 11/6  - BLE duplex negative for DVT 11/6    ID: UTI s/p CTX course  - Monitor clinically for signs of active infection    Endo: no acute issues  - Monitor glucose on BMP    Dispo:  - SICU  - DNR

## 2020-11-14 NOTE — PROGRESS NOTE ADULT - ASSESSMENT
[facility]            (For Outpatient Use Only) Initial Admit Date: 9/12/2017   Inpt/Obs Admit Date: Inpt: 9/12/17 / Obs: N/A   Discharge Date:    Aggie Mccracken:  [de-identified]   MRN: [de-identified]   CSN: [id number]        ENCOUNTER  Patient Class Subscriber ID:  Pt Rel to Subscriber:    Hospital Account Financial Class: Medicare    September 15, 2017 80 year old female, with a medical history significant for dementia, not on A/C, BIBEMS unresponsive with head injury, reportedly fell, intubated in the ED to protect airway (GS=8), imaging studies showed left acute SDH, repeat CT after  shunt ligation shows stable SDH and left to right shift. CT head 4/11 revealed decreased subdural hematoma and midline shift 11mm-> 7 mm. New low density cerebellar focus suspicious for infarcts. Pt neuro status improved, following commands and opens the eyes spontaneously.      Plan:   - Pain control   - c/w intubation and mechanical ventilation.   - Now off all sedation  - Recommend trach and PEG, pending discussion.  - Appreciate neurosurgery:  Conservative management.   - Follow up with family meeting discussing of GOC. No Peg, No Trach per family. Now f/u palliative care recs  - Appreciate SICU care  - DNR    ACS  [id number]

## 2020-11-14 NOTE — PROGRESS NOTE ADULT - ASSESSMENT
79 y/o  woman with dementia, hydrocephalus s/p VPS admitted after fall at nursing facility with LOC and unresponsiveness.  Initial CTH with acute SDH L>R and on interhemispheric fissure and tentorial leaves with rightward shift 11mm decreased to 7mm. Repeat CTH with new R inferior cerebellar hypodensity concerning for infarct. Neuro exam without appreciable interval change: intubated, C-collar, no verbal output, not following commands, movement of extremities L>R w/ noxious stimuli. EEG with L slowing and R periodic discharges but no seizures, LDL 84, HbA1C 5.6, LE doppler neg for dvt    Recommendations:  - SBP goal <160; SBP goal 100-160 given SDH  - c/w Keppra 500mg BID indefinitely for now given EEG results  - Hold antiplatelets given SDH; consider when cleared from nsx standpoint.  - Lipitor 80mg can be reduced to 40mg  -Telemetry monitoring  - SBP goal normotensive, less than 160/90.   - MRI/MRA when able if in agreement with GOC of family   - GOC discussion pending, likely needs trach/PEG, son doesn't want surgery. f/u with family  - tele  - PT/OT/SS/SLP, OOBC when able  - check FS, glucose control <180  - GI/DVT ppx  - Counseling on diet, exercise, and medication adherence was done  - Counseling on smoking cessation and alcohol consumption offered when appropriate.  - Pain assessed and judicious use of narcotics when appropriate was discussed.    - Stroke education given when appropriate.  - Importance of fall prevention discussed.   - Differential diagnosis and plan of care discussed with patient and/or family and primary team  - Thank you for allowing me to participate in the care of this patient. Call with questions.   Ricki Mcmanus MD  Vascular Neurology  Office: [phone]

## 2020-11-15 LAB
ANION GAP SERPL CALC-SCNC: 10 MMOL/L — SIGNIFICANT CHANGE UP (ref 5–17)
APTT BLD: 31 SEC — SIGNIFICANT CHANGE UP (ref 27.5–35.5)
BUN SERPL-MCNC: 40 MG/DL — HIGH (ref 7–23)
CALCIUM SERPL-MCNC: 8.6 MG/DL — SIGNIFICANT CHANGE UP (ref 8.4–10.5)
CHLORIDE SERPL-SCNC: 110 MMOL/L — HIGH (ref 96–108)
CO2 SERPL-SCNC: 25 MMOL/L — SIGNIFICANT CHANGE UP (ref 22–31)
CREAT SERPL-MCNC: 0.44 MG/DL — LOW (ref 0.5–1.3)
GLUCOSE SERPL-MCNC: 184 MG/DL — HIGH (ref 70–99)
HCT VFR BLD CALC: 27.6 % — LOW (ref 34.5–45)
HGB BLD-MCNC: 8.5 G/DL — LOW (ref 11.5–15.5)
INR BLD: 1.19 RATIO — HIGH (ref 0.88–1.16)
MAGNESIUM SERPL-MCNC: 2.6 MG/DL — SIGNIFICANT CHANGE UP (ref 1.6–2.6)
MCHC RBC-ENTMCNC: 27.3 PG — SIGNIFICANT CHANGE UP (ref 27–34)
MCHC RBC-ENTMCNC: 30.8 GM/DL — LOW (ref 32–36)
MCV RBC AUTO: 88.7 FL — SIGNIFICANT CHANGE UP (ref 80–100)
NRBC # BLD: 0 /100 WBCS — SIGNIFICANT CHANGE UP (ref 0–0)
PHOSPHATE SERPL-MCNC: 3 MG/DL — SIGNIFICANT CHANGE UP (ref 2.5–4.5)
PLATELET # BLD AUTO: 493 K/UL — HIGH (ref 150–400)
POTASSIUM SERPL-MCNC: 4.3 MMOL/L — SIGNIFICANT CHANGE UP (ref 3.5–5.3)
POTASSIUM SERPL-SCNC: 4.3 MMOL/L — SIGNIFICANT CHANGE UP (ref 3.5–5.3)
PROTHROM AB SERPL-ACNC: 14.2 SEC — HIGH (ref 10.6–13.6)
RBC # BLD: 3.11 M/UL — LOW (ref 3.8–5.2)
RBC # FLD: 15.9 % — HIGH (ref 10.3–14.5)
SODIUM SERPL-SCNC: 145 MMOL/L — SIGNIFICANT CHANGE UP (ref 135–145)
WBC # BLD: 11.73 K/UL — HIGH (ref 3.8–10.5)
WBC # FLD AUTO: 11.73 K/UL — HIGH (ref 3.8–10.5)

## 2020-11-15 PROCEDURE — 99223 1ST HOSP IP/OBS HIGH 75: CPT | Mod: GC

## 2020-11-15 PROCEDURE — 71045 X-RAY EXAM CHEST 1 VIEW: CPT | Mod: 26

## 2020-11-15 RX ADMIN — LEVETIRACETAM 500 MILLIGRAM(S): 250 TABLET, FILM COATED ORAL at 05:23

## 2020-11-15 RX ADMIN — ENOXAPARIN SODIUM 40 MILLIGRAM(S): 100 INJECTION SUBCUTANEOUS at 12:09

## 2020-11-15 RX ADMIN — CHLORHEXIDINE GLUCONATE 15 MILLILITER(S): 213 SOLUTION TOPICAL at 05:23

## 2020-11-15 RX ADMIN — Medication 50 MILLIGRAM(S): at 17:48

## 2020-11-15 RX ADMIN — PANTOPRAZOLE SODIUM 40 MILLIGRAM(S): 20 TABLET, DELAYED RELEASE ORAL at 12:09

## 2020-11-15 RX ADMIN — NYSTATIN CREAM 1 APPLICATION(S): 100000 CREAM TOPICAL at 17:48

## 2020-11-15 RX ADMIN — POLYETHYLENE GLYCOL 3350 17 GRAM(S): 17 POWDER, FOR SOLUTION ORAL at 17:48

## 2020-11-15 RX ADMIN — CHLORHEXIDINE GLUCONATE 15 MILLILITER(S): 213 SOLUTION TOPICAL at 17:48

## 2020-11-15 RX ADMIN — ATORVASTATIN CALCIUM 40 MILLIGRAM(S): 80 TABLET, FILM COATED ORAL at 21:32

## 2020-11-15 RX ADMIN — CHLORHEXIDINE GLUCONATE 1 APPLICATION(S): 213 SOLUTION TOPICAL at 05:24

## 2020-11-15 RX ADMIN — NYSTATIN CREAM 1 APPLICATION(S): 100000 CREAM TOPICAL at 05:23

## 2020-11-15 RX ADMIN — POLYETHYLENE GLYCOL 3350 17 GRAM(S): 17 POWDER, FOR SOLUTION ORAL at 05:23

## 2020-11-15 RX ADMIN — AMLODIPINE BESYLATE 10 MILLIGRAM(S): 2.5 TABLET ORAL at 05:23

## 2020-11-15 RX ADMIN — SENNA PLUS 10 MILLILITER(S): 8.6 TABLET ORAL at 21:32

## 2020-11-15 RX ADMIN — Medication 50 MILLIGRAM(S): at 05:23

## 2020-11-15 RX ADMIN — LEVETIRACETAM 500 MILLIGRAM(S): 250 TABLET, FILM COATED ORAL at 18:03

## 2020-11-15 NOTE — PROGRESS NOTE ADULT - SUBJECTIVE AND OBJECTIVE BOX
HISTORY  80F hx of dementia, hydrocephalus s/p  shunt and recent hip fracture non-operative management at SNF presents from SNF after a mechanical fall, level I trauma activated, primary survey significant for GCS 8, decision was made to intubate in trauma bay. Secondary survey significant for L forehead hematoma, R lower abdomen abrasion. CT scan significant for large L SDH with midline shift and L 8-10 rib fx. NSGY and family discussed, plan for non-operative management at this time. Patient was transferred to SICU for vent management and hemodynamic monitoring.    NSGY ligated  shunt at bedside. Repeat CT head was stable. Patient was briefly started on a cardene gtt for hypertensive urgency, goal SBP < 180. UA+ started on meropenem. Plan per NSGY to continue conservative management with potential hazel hole in future. KO tube feeds started on 11/4. Metoprolol 25Q12 started, Labetalol Q4PRN for HTN. Due to pcn allergy meropenum given for UTI than switched to Cefepime then to Ceftriaxone.    24 HOUR EVENTS:  - No acute events    SUBJECTIVE/ROS:  [ ] A ten-point review of systems was otherwise negative except as noted.  [X] Due to altered mental status/intubation, subjective information were not able to be obtained from the patient. History was obtained, to the extent possible, from review of the chart and collateral sources of information.      NEURO  Exam: Unchanged from previous. Opens eyes spontaneously, responsive pupils b/l, positive gag, moves LLE spontanous, withdraws all four extremities to pain  Meds: acetaminophen    Suspension .. 975 milliGRAM(s) Oral every 6 hours PRN Mild Pain (1 - 3)  levETIRAcetam  Solution 500 milliGRAM(s) Enteral Tube two times a day    [x] Adequacy of sedation and pain control has been assessed and adjusted      RESPIRATORY  RR: 16 (11-15-20 @ 00:00) (16 - 38)  SpO2: 100% (11-15-20 @ 00:10) (97% - 100%)  Wt(kg): --  Exam: CTA B/L, no wheezes, rales, rhonchi  Mechanical Ventilation: Mode: CPAP with PS, RR (patient): 20, FiO2: 30, PEEP: 5, PS: 5, MAP: 6, PIP: 11    [ ] Extubation Readiness Assessed  Meds:       CARDIOVASCULAR  HR: 84 (11-15-20 @ 00:10) (75 - 103)  BP: 144/65 (11-15-20 @ 00:00) (143/65 - 190/82)  BP(mean): 93 (11-15-20 @ 00:00) (93 - 120)  ABP: --  ABP(mean): --  Wt(kg): --  CVP(cm H2O): --      Exam:  Cardiac Rhythm:RRR  Perfusion     [X]Adequate   [ ]Inadequate  Mentation   []Normal       [X]Reduced  Extremities  [X]Warm         [ ]Cool  Volume Status [ ]Hypervolemic [X]Euvolemic [ ]Hypovolemic  Meds: amLODIPine   Tablet 10 milliGRAM(s) Oral daily  metoprolol tartrate 50 milliGRAM(s) Oral every 12 hours        GI/NUTRITION  Exam: Abd soft  Diet: NPO  Meds: pantoprazole  Injectable 40 milliGRAM(s) IV Push daily  polyethylene glycol 3350 17 Gram(s) Oral two times a day  senna Syrup 10 milliLiter(s) Oral at bedtime      GENITOURINARY  I&O's Detail    11-13 @ 07:01  -  11-14 @ 07:00  --------------------------------------------------------  IN:    Enteral Tube Flush: 120 mL    IV PiggyBack: 250 mL    Pivot 1.5: 1320 mL  Total IN: 1690 mL    OUT:    Incontinent per Collection Bag (mL): 1200 mL  Total OUT: 1200 mL    Total NET: 490 mL      11-14 @ 07:01  -  11-15 @ 00:49  --------------------------------------------------------  IN:    Enteral Tube Flush: 60 mL    Pivot 1.5: 935 mL  Total IN: 995 mL    OUT:    Incontinent per Collection Bag (mL): 650 mL  Total OUT: 650 mL    Total NET: 345 mL          11-13    143  |  109<H>  |  36<H>  ----------------------------<  186<H>  4.1   |  25  |  0.46<L>    Ca    8.9      13 Nov 2020 23:48  Phos  2.7     11-13  Mg     2.6     11-13      [ ] Carrasco catheter, indication:   Meds:       HEMATOLOGIC  Meds: enoxaparin Injectable 40 milliGRAM(s) SubCutaneous daily    [x] VTE Prophylaxis                        8.5    11.73 )-----------( 493      ( 15 Nov 2020 00:25 )             27.6     PT/INR - ( 15 Nov 2020 00:25 )   PT: 14.2 sec;   INR: 1.19 ratio         PTT - ( 15 Nov 2020 00:25 )  PTT:31.0 sec  Transfusion     [ ] PRBC   [ ] Platelets   [ ] FFP   [ ] Cryoprecipitate      INFECTIOUS DISEASES  T(C): 37.6 (11-14-20 @ 23:00), Max: 37.8 (11-14-20 @ 19:00)  Wt(kg): --  WBC Count: 11.73 K/uL (11-15 @ 00:25)    Recent Cultures:    Meds:       ENDOCRINE  Capillary Blood Glucose    Meds: atorvastatin 40 milliGRAM(s) Oral at bedtime        ACCESS DEVICES:  [x] Peripheral IV  [ ] Central Venous Line	[ ] R	[ ] L	[ ] IJ	[ ] Fem	[ ] SC	Placed:   [ ] Arterial Line		[ ] R	[ ] L	[ ] Fem	[ ] Rad	[ ] Ax	Placed:   [ ] PICC:					[ ] Mediport  [ ] Urinary Catheter, Date Placed:   [x] Necessity of urinary, arterial, and venous catheters discussed    OTHER MEDICATIONS:  chlorhexidine 0.12% Liquid 15 milliLiter(s) Oral Mucosa every 12 hours  chlorhexidine 2% Cloths 1 Application(s) Topical <User Schedule>  nystatin Powder 1 Application(s) Topical two times a day      CODE STATUS: Yes        IMAGING:

## 2020-11-15 NOTE — PROGRESS NOTE ADULT - ASSESSMENT
80 year old female, with a medical history significant for dementia, not on A/C, BIBEMS unresponsive with head injury, reportedly fell, intubated in the ED to protect airway (GS=8), imaging studies showed left acute SDH, repeat CT after  shunt ligation shows stable SDH and left to right shift. CT head 4/11 revealed decreased subdural hematoma and midline shift 11mm-> 7 mm. New low density cerebellar focus suspicious for infarcts. Pt neuro status improved, following commands and opens the eyes spontaneously.      Plan:   - Pain control   - c/w intubation and mechanical ventilation.   - Now off all sedation  - Appreciate neurosurgery:  Conservative management.   - Follow up with family meeting discussing of GOC. Family declining trach/PEG at this time. Now f/u palliative care recs  - Appreciate SICU care  - DNR    ACS  [id number]

## 2020-11-15 NOTE — PROGRESS NOTE ADULT - SUBJECTIVE AND OBJECTIVE BOX
SURGERY PROGRESS NOTE    SUBJECTIVE / 24H EVENTS:  Patient seen and examined on morning rounds. No acute events overnight.    24 HOUR EVENTS:  - No acute events      OBJECTIVE:  ICU Vital Signs Last 24 Hrs  T(C): 36.6 (15 Nov 2020 12:00), Max: 37.8 (2020 19:00)  T(F): 97.9 (15 Nov 2020 12:00), Max: 100 (2020 19:00)  HR: 95 (15 Nov 2020 12:09) (74 - 103)  BP: 147/70 (15 Nov 2020 12:) (144/65 - 190/82)  BP(mean): 101 (15 Nov 2020 12:) (93 - 118)  ABP: --  ABP(mean): --  RR: 24 (15 Nov 2020 12:) (16 - 38)  SpO2: 100% (15 Nov 2020 12:09) (98% - 100%)    PHYSICAL EXAM:  General: intubated, appears to be in NAD.   Chest: Intubated on mechanical ventilation.   Neuro: Occasionally following commands, with spontaneous movement of extremities.   Abdomen: soft, nontender, nondistended   Extremities: well perfused      I&O's Detail    2020 07:01  -  15 Nov 2020 07:00  --------------------------------------------------------  IN:    Enteral Tube Flush: 120 mL    Pivot 1.5: 1320 mL  Total IN: 1440 mL    OUT:    Incontinent per Collection Bag (mL): 1150 mL  Total OUT: 1150 mL    Total NET: 290 mL      15 Nov 2020 07:  -  15 Nov 2020 13:59  --------------------------------------------------------  IN:    Pivot 1.5: 275 mL  Total IN: 275 mL    OUT:    Incontinent per Collection Bag (mL): 350 mL  Total OUT: 350 mL    Total NET: -75 mL        LAB VALUES:  15    145  |  110<H>  |  40<H>  ----------------------------<  184<H>  4.3   |  25  |  0.44<L>    Ca    8.6      15 Nov 2020 00:25  Phos  3.0     11-15  Mg     2.6     11-15                            8.5    11.73 )-----------( 493      ( 15 Nov 2020 00:25 )             27.6     PT/INR - ( 15 Nov 2020 00:25 )   PT: 14.2 sec;   INR: 1.19 ratio         PTT - ( 15 Nov 2020 00:25 )  PTT:31.0 sec          Urinalysis Basic - ( 2020 05:23 )    Color: Yellow / Appearance: Slightly Turbid / S.029 / pH: x  Gluc: x / Ketone: Trace  / Bili: Negative / Urobili: 3 mg/dL   Blood: x / Protein: 30 mg/dL / Nitrite: Negative   Leuk Esterase: Moderate / RBC: 7 /hpf / WBC 12 /HPF   Sq Epi: x / Non Sq Epi: 4 /hpf / Bacteria: Negative        MICROBIOLOGY:      RADIOLOGY: < from: Xray Chest 1 View- PORTABLE-Routine (Xray Chest 1 View- PORTABLE-Routine in AM.) (11.15.20 @ 07:17) >    Impression:    The heart is normal in size. Left lower lobe pneumonia and/or atelectasis. The right lung is clear. Endotracheal tube is just above the deisy and should be withdrawn Slightly. NG tube is in the stomach however its tip is not seen on the current study.      < end of copied text >          MEDICATIONS  (STANDING):  amLODIPine   Tablet 10 milliGRAM(s) Oral daily  atorvastatin 40 milliGRAM(s) Oral at bedtime  chlorhexidine 0.12% Liquid 15 milliLiter(s) Oral Mucosa every 12 hours  chlorhexidine 2% Cloths 1 Application(s) Topical <User Schedule>  enoxaparin Injectable 40 milliGRAM(s) SubCutaneous daily  lactulose Syrup 20 Gram(s) Enteral Tube three times a day  levETIRAcetam  Solution 500 milliGRAM(s) Enteral Tube two times a day  metoprolol tartrate 50 milliGRAM(s) Oral every 12 hours  nystatin Powder 1 Application(s) Topical two times a day  pantoprazole  Injectable 40 milliGRAM(s) IV Push daily  polyethylene glycol 3350 17 Gram(s) Oral two times a day  senna Syrup 10 milliLiter(s) Oral at bedtime    MEDICATIONS  (PRN):  acetaminophen    Suspension .. 975 milliGRAM(s) Oral every 6 hours PRN Mild Pain (1 - 3)  labetalol Injectable 10 milliGRAM(s) IV Push every 4 hours PRN SBP > 180

## 2020-11-15 NOTE — PROGRESS NOTE ADULT - ASSESSMENT
79 y/o  woman with dementia, hydrocephalus s/p VPS admitted after fall at nursing facility with LOC and unresponsiveness.  Initial CTH with acute SDH L>R and on interhemispheric fissure and tentorial leaves with rightward shift 11mm decreased to 7mm. Repeat CTH with new R inferior cerebellar hypodensity concerning for infarct. Neuro exam without appreciable interval change: intubated, C-collar, no verbal output, not following commands, movement of extremities L>R w/ noxious stimuli. EEG with L slowing and R periodic discharges but no seizures, LDL 84, HbA1C 5.6, LE doppler neg for dvt    Recommendations:  - SBP goal <160; SBP goal 100-160 given SDH  - c/w Keppra 500mg BID indefinitely for now given EEG results  - Hold antiplatelets given SDH; consider when cleared from nsx standpoint.  - Lipitor 80mg can be reduced to 40mg  -Telemetry monitoring  - SBP goal normotensive, less than 160/90.   - MRI/MRA when able if in agreement with GOC of family unlikely to .   - GOC discussion pending, likely needs trach/PEG, son doesn't want surgery. f/u with family  - tele  - PT/OT/SS/SLP, OOBC when able  - check FS, glucose control <180  - GI/DVT ppx  - Counseling on diet, exercise, and medication adherence was done  - Counseling on smoking cessation and alcohol consumption offered when appropriate.  - Pain assessed and judicious use of narcotics when appropriate was discussed.    - Stroke education given when appropriate.  - Importance of fall prevention discussed.   - Differential diagnosis and plan of care discussed with patient and/or family and primary team  - Thank you for allowing me to participate in the care of this patient. Call with questions.   - will f/u PRN or as questions arise  Ricki Mcmanus MD  Vascular Neurology  Office: [phone]

## 2020-11-15 NOTE — PROGRESS NOTE ADULT - ATTENDING COMMENTS
pt is neurologically unchanged, only grimaces to painful stimuli, and moving lower and left upp ext, no purposeful movements. reactive pupils bilat    intubated  on PS minimal resp support  off all sedation for several days  NG tube feeds at goal  bowel movements  on VTE PPX  no antibiotics    per report, family is seeking second opinion.

## 2020-11-16 LAB
ANION GAP SERPL CALC-SCNC: 11 MMOL/L — SIGNIFICANT CHANGE UP (ref 5–17)
APTT BLD: 31.4 SEC — SIGNIFICANT CHANGE UP (ref 27.5–35.5)
BUN SERPL-MCNC: 43 MG/DL — HIGH (ref 7–23)
CALCIUM SERPL-MCNC: 9.2 MG/DL — SIGNIFICANT CHANGE UP (ref 8.4–10.5)
CHLORIDE SERPL-SCNC: 110 MMOL/L — HIGH (ref 96–108)
CO2 SERPL-SCNC: 24 MMOL/L — SIGNIFICANT CHANGE UP (ref 22–31)
CREAT SERPL-MCNC: 0.46 MG/DL — LOW (ref 0.5–1.3)
GAS PNL BLDA: SIGNIFICANT CHANGE UP
GLUCOSE SERPL-MCNC: 165 MG/DL — HIGH (ref 70–99)
HCT VFR BLD CALC: 30 % — LOW (ref 34.5–45)
HGB BLD-MCNC: 9.2 G/DL — LOW (ref 11.5–15.5)
INR BLD: 1.2 RATIO — HIGH (ref 0.88–1.16)
MAGNESIUM SERPL-MCNC: 2.6 MG/DL — SIGNIFICANT CHANGE UP (ref 1.6–2.6)
MCHC RBC-ENTMCNC: 27.1 PG — SIGNIFICANT CHANGE UP (ref 27–34)
MCHC RBC-ENTMCNC: 30.7 GM/DL — LOW (ref 32–36)
MCV RBC AUTO: 88.2 FL — SIGNIFICANT CHANGE UP (ref 80–100)
NRBC # BLD: 0 /100 WBCS — SIGNIFICANT CHANGE UP (ref 0–0)
PHOSPHATE SERPL-MCNC: 3 MG/DL — SIGNIFICANT CHANGE UP (ref 2.5–4.5)
PLATELET # BLD AUTO: 543 K/UL — HIGH (ref 150–400)
POTASSIUM SERPL-MCNC: 4.4 MMOL/L — SIGNIFICANT CHANGE UP (ref 3.5–5.3)
POTASSIUM SERPL-SCNC: 4.4 MMOL/L — SIGNIFICANT CHANGE UP (ref 3.5–5.3)
PROTHROM AB SERPL-ACNC: 14.3 SEC — HIGH (ref 10.6–13.6)
RBC # BLD: 3.4 M/UL — LOW (ref 3.8–5.2)
RBC # FLD: 15.9 % — HIGH (ref 10.3–14.5)
SODIUM SERPL-SCNC: 145 MMOL/L — SIGNIFICANT CHANGE UP (ref 135–145)
WBC # BLD: 11.74 K/UL — HIGH (ref 3.8–10.5)
WBC # FLD AUTO: 11.74 K/UL — HIGH (ref 3.8–10.5)

## 2020-11-16 PROCEDURE — 99232 SBSQ HOSP IP/OBS MODERATE 35: CPT | Mod: GC

## 2020-11-16 RX ORDER — POLYETHYLENE GLYCOL 3350 17 G/17G
17 POWDER, FOR SOLUTION ORAL DAILY
Refills: 0 | Status: DISCONTINUED | OUTPATIENT
Start: 2020-11-16 | End: 2020-11-18

## 2020-11-16 RX ADMIN — SENNA PLUS 10 MILLILITER(S): 8.6 TABLET ORAL at 23:49

## 2020-11-16 RX ADMIN — ENOXAPARIN SODIUM 40 MILLIGRAM(S): 100 INJECTION SUBCUTANEOUS at 12:06

## 2020-11-16 RX ADMIN — LEVETIRACETAM 500 MILLIGRAM(S): 250 TABLET, FILM COATED ORAL at 06:56

## 2020-11-16 RX ADMIN — AMLODIPINE BESYLATE 10 MILLIGRAM(S): 2.5 TABLET ORAL at 06:19

## 2020-11-16 RX ADMIN — ATORVASTATIN CALCIUM 40 MILLIGRAM(S): 80 TABLET, FILM COATED ORAL at 23:49

## 2020-11-16 RX ADMIN — LEVETIRACETAM 500 MILLIGRAM(S): 250 TABLET, FILM COATED ORAL at 17:23

## 2020-11-16 RX ADMIN — CHLORHEXIDINE GLUCONATE 15 MILLILITER(S): 213 SOLUTION TOPICAL at 17:23

## 2020-11-16 RX ADMIN — CHLORHEXIDINE GLUCONATE 15 MILLILITER(S): 213 SOLUTION TOPICAL at 06:10

## 2020-11-16 RX ADMIN — Medication 50 MILLIGRAM(S): at 17:23

## 2020-11-16 RX ADMIN — NYSTATIN CREAM 1 APPLICATION(S): 100000 CREAM TOPICAL at 06:35

## 2020-11-16 RX ADMIN — NYSTATIN CREAM 1 APPLICATION(S): 100000 CREAM TOPICAL at 17:23

## 2020-11-16 RX ADMIN — Medication 50 MILLIGRAM(S): at 06:19

## 2020-11-16 RX ADMIN — PANTOPRAZOLE SODIUM 40 MILLIGRAM(S): 20 TABLET, DELAYED RELEASE ORAL at 12:07

## 2020-11-16 NOTE — PROGRESS NOTE ADULT - ATTENDING COMMENTS
no change in mental status, rt ue no movements, left upp flexion to noxious stimulus, lower ext withdrawing to pain, pupil exam unchanged (3mm reactive)  per report, family may be considering comfort measures this week    - will reach out to family regarding GOC given poor prognosis and no change in mental status off sedation for many days. family previously declined PEG trach  - cont feeds  - on lovenox  - has BMs, adequate urine output  - cont PS vent

## 2020-11-16 NOTE — PROGRESS NOTE ADULT - ASSESSMENT
80F hx of dementia, hydrocephalus s/p  shunt and recent hip fracture non-operative management at SNF presents from SNF after a mechanical fall, level I trauma activated, primary survey significant for GCS 8, decision was made to intubate in trauma bay. Secondary survey significant for L forehead hematoma, R lower abdomen abrasion. CT scan significant for large L SDH with midline shift and L 8-10 rib fx. NSGY and family discussed, plan for non-operative management at this time. CT head 4/11 revealed decreased subdural hematoma and midline shift 11mm-> 7 mm. New low density cerebellar focus suspicious for infarcts.   Ongoing GOC discussion, no neurosurgery planned at this time.    PLAN:  Neuro: s/p  shunt ligation at level of clavicle  - q4h neurochecks  - CT head 11/9 increased size of ventricles and hemorrhage layering in occipital horns, similar midline shift and subdural hemorrhage  - No current plans for neurosurgical intervention  - EEG with focal area of high seizure risk, keppra 500mg BID for seizure prophylaxis  - Off all sedation; PO tylenol, lidoderm  - f/u discussions with family and neurosurgery team    Resp: L 8-10 rib fx  - Intubated, PRVC 400/12/5/30%  - Trach planning pending GOC discussions   - Daily CXR    CV: Goal SBP <200  - Home atorvastatin  - Metoprolol 50mg Q12  - Amlodipine 10mg daily  - Labetalol 10mg IV Q4 PRN    GI: no acute issues  - Tube feeds pivot @ 55  - Liver mass (+ renal mass) finding discussed with son   - Discussion of PEG pending GOC   - Protonix for stress ulcer prophylaxis  - miralax/senna/lactulose    : page removed 11/9, passed TOV  - Monitor I/Os, UOP    Heme: acute anemia  - Lovenox VTE ppx started 11/6  - BLE duplex negative for DVT 11/6    ID: UTI s/p CTX course  - Monitor clinically for signs of active infection    Endo: no acute issues  - Monitor glucose on BMP    Dispo:  - SICU  - DNR   80F hx of dementia, hydrocephalus s/p  shunt and recent hip fracture non-operative management at SNF presents from SNF after a mechanical fall, level I trauma activated, primary survey significant for GCS 8, decision was made to intubate in trauma bay. Secondary survey significant for L forehead hematoma, R lower abdomen abrasion. CT scan significant for large L SDH with midline shift and L 8-10 rib fx. NSGY and family discussed, plan for non-operative management at this time. CT head 4/11 revealed decreased subdural hematoma and midline shift 11mm-> 7 mm. New low density cerebellar focus suspicious for infarcts.   Ongoing GOC discussion, no neurosurgery planned at this time.    PLAN:  Neuro: s/p  shunt ligation at level of clavicle  - q4h neurochecks  - CT head 11/9 increased size of ventricles and hemorrhage layering in occipital horns, similar midline shift and subdural hemorrhage  - No current plans for neurosurgical intervention  - EEG with focal area of high seizure risk, keppra 500mg BID for seizure prophylaxis  - Off all sedation; PO tylenol, lidoderm  - f/u discussions with family and neurosurgery team    Resp: L 8-10 rib fx  - Intubated, Tolerates CPAP 5 FiO2 30% with PRN PRVC 400/12/5/30%  - pt would not want trach as per family  - Daily CXR    CV: Goal SBP <200  - Home atorvastatin  - Metoprolol 50mg Q12  - Amlodipine 10mg daily  - Labetalol 10mg IV Q4 PRN    GI: no acute issues  - Tube feeds pivot @ 55  - Liver mass (+ renal mass) finding discussed with son   - Discussion of PEG pending GOC   - Protonix for stress ulcer prophylaxis  - miralax/senna/lactulose    : page removed 11/9, passed TOV  - Monitor I/Os, UOP    Heme: acute anemia  - Lovenox VTE ppx started 11/6  - BLE duplex negative for DVT 11/6    ID: UTI s/p CTX course  - Monitor clinically for signs of active infection    Endo: no acute issues  - Monitor glucose on BMP    Dispo:  - SICU  - DNR. Ongoing GOC discussion. Possible palliative extubation tomorrow. Will reconsult Palliative to see if pt eligible for PCU bed.

## 2020-11-16 NOTE — PROGRESS NOTE ADULT - ASSESSMENT
Assessment:   80 year old female, with a medical history significant for dementia, not on A/C, BIBEMS unresponsive with head injury, reportedly fell, intubated in the ED to protect airway (GS=8), imaging studies showed left acute SDH, repeat CT after  shunt ligation shows stable SDH and left to right shift. CT head 4/11 revealed decreased subdural hematoma and midline shift 11mm-> 7 mm. New low density cerebellar focus suspicious for infarcts. Pt neuro status improved, following commands and opens the eyes spontaneously.      Plan:   - Pain control   - c/w intubation and mechanical ventilation.   - Now off all sedation  - Appreciate neurosurgery:  Conservative management.   - Follow up with family meeting discussing of GOC. Family considering palliative extubation.   - Appreciate SICU care  - DNR    ACS  [id number]

## 2020-11-16 NOTE — PROGRESS NOTE ADULT - SUBJECTIVE AND OBJECTIVE BOX
HISTORY  80F hx of dementia, hydrocephalus s/p  shunt and recent hip fracture non-operative management at SNF presents from SNF after a mechanical fall, level I trauma activated, primary survey significant for GCS 8, decision was made to intubate in trauma bay. Secondary survey significant for L forehead hematoma, R lower abdomen abrasion. CT scan significant for large L SDH with midline shift and L 8-10 rib fx. NSGY and family discussed, plan for non-operative management at this time. Patient was transferred to SICU for vent management and hemodynamic monitoring.    NSGY ligated  shunt at bedside. Repeat CT head was stable. Patient was briefly started on a cardene gtt for hypertensive urgency, goal SBP < 180. UA+ started on meropenem. Plan per NSGY to continue conservative management with potential hazel hole in future. KO tube feeds started on 11/4. Metoprolol 25Q12 started, Labetalol Q4PRN for HTN. Due to pcn allergy meropenum given for UTI than switched to Cefepime then to Ceftriaxone.    24 HOUR EVENTS:  - No acute events    SUBJECTIVE/ROS:  [ ] A ten-point review of systems was otherwise negative except as noted.  [X] Due to altered mental status/intubation, subjective information were not able to be obtained from the patient. History was obtained, to the extent possible, from review of the chart and collateral sources of information.      NEURO  Exam: Unchanged from previous. Opens eyes spontaneously, responsive pupils b/l, positive gag, moves LLE spontanous, withdraws all four extremities to pain  Meds: acetaminophen    Suspension .. 975 milliGRAM(s) Oral every 6 hours PRN Mild Pain (1 - 3)  levETIRAcetam  Solution 500 milliGRAM(s) Enteral Tube two times a day    [x] Adequacy of sedation and pain control has been assessed and adjusted      RESPIRATORY  RR: 21 (16 Nov 2020 00:00) (18 - 26)  SpO2: 99% (16 Nov 2020 00:00) (97% - 100%)    Exam: CTA B/L, no wheezes, rales, rhonchi  Mechanical Ventilation: Mode: CPAP with PS, RR (patient): 20, FiO2: 30, PEEP: 5, PS: 5, MAP: 6, PIP: 11    [ ] Extubation Readiness Assessed  Meds:       CARDIOVASCULAR  HR: 92 (16 Nov 2020 00:00) (74 - 102)  BP: 140/65 (16 Nov 2020 00:00) (140/65 - 169/77)  BP(mean): 93 (16 Nov 2020 00:00) (93 - 111)  ABP: --  ABP(mean): --      Exam:  Cardiac Rhythm:RRR  Perfusion     [X]Adequate   [ ]Inadequate  Mentation   []Normal       [X]Reduced  Extremities  [X]Warm         [ ]Cool  Volume Status [ ]Hypervolemic [X]Euvolemic [ ]Hypovolemic  Meds: amLODIPine   Tablet 10 milliGRAM(s) Oral daily  metoprolol tartrate 50 milliGRAM(s) Oral every 12 hours      GI/NUTRITION  Exam: Abd soft  Diet: NPO  Meds: pantoprazole  Injectable 40 milliGRAM(s) IV Push daily  polyethylene glycol 3350 17 Gram(s) Oral two times a day  senna Syrup 10 milliLiter(s) Oral at bedtime      GENITOURINARY    14 Nov 2020 07:01  -  15 Nov 2020 07:00  --------------------------------------------------------  IN:    Enteral Tube Flush: 120 mL    Pivot 1.5: 1320 mL  Total IN: 1440 mL    OUT:    Incontinent per Collection Bag (mL): 1150 mL  Total OUT: 1150 mL    Total NET: 290 mL      15 Nov 2020 07:01  -  16 Nov 2020 00:37  --------------------------------------------------------  IN:    Enteral Tube Flush: 300 mL    Pivot 1.5: 990 mL  Total IN: 1290 mL    OUT:    Incontinent per Collection Bag (mL): 1100 mL  Total OUT: 1100 mL    Total NET: 190 mL      11-15    145  |  110<H>  |  40<H>  ----------------------------<  184<H>  4.3   |  25  |  0.44<L>    Ca    8.6      15 Nov 2020 00:25  Phos  3.0     11-15  Mg     2.6     11-15        [ ] Carrasco catheter, indication:   Meds:       HEMATOLOGIC  Meds: enoxaparin Injectable 40 milliGRAM(s) SubCutaneous daily    [x] VTE Prophylaxis                                   8.5    11.73 )-----------( 493      ( 15 Nov 2020 00:25 )             27.6     PT/INR - ( 15 Nov 2020 00:25 )   PT: 14.2 sec;   INR: 1.19 ratio         PTT - ( 15 Nov 2020 00:25 )  PTT:31.0 sec  Transfusion     [ ] PRBC   [ ] Platelets   [ ] FFP   [ ] Cryoprecipitate      INFECTIOUS DISEASES  T(C): 37.1 (15 Nov 2020 23:00), Max: 37.6 (15 Nov 2020 03:00)  T(F): 98.8 (15 Nov 2020 23:00), Max: 99.7 (15 Nov 2020 03:00)  HR: 92 (16 Nov 2020 00:00) (74 - 102)    Recent Cultures:    Meds:     ENDOCRINE  Capillary Blood Glucose    Meds: atorvastatin 40 milliGRAM(s) Oral at bedtime    ACCESS DEVICES:  [x] Peripheral IV  [ ] Central Venous Line	[ ] R	[ ] L	[ ] IJ	[ ] Fem	[ ] SC	Placed:   [ ] Arterial Line		[ ] R	[ ] L	[ ] Fem	[ ] Rad	[ ] Ax	Placed:   [ ] PICC:					[ ] Mediport  [ ] Urinary Catheter, Date Placed:   [x] Necessity of urinary, arterial, and venous catheters discussed    OTHER MEDICATIONS:  chlorhexidine 0.12% Liquid 15 milliLiter(s) Oral Mucosa every 12 hours  chlorhexidine 2% Cloths 1 Application(s) Topical <User Schedule>  nystatin Powder 1 Application(s) Topical two times a day      CODE STATUS: Yes        IMAGING: HISTORY  80F hx of dementia, hydrocephalus s/p  shunt and recent hip fracture non-operative management at SNF presents from SNF after a mechanical fall, level I trauma activated, primary survey significant for GCS 8, decision was made to intubate in trauma bay. Secondary survey significant for L forehead hematoma, R lower abdomen abrasion. CT scan significant for large L SDH with midline shift and L 8-10 rib fx. NSGY and family discussed, plan for non-operative management at this time. Patient was transferred to SICU for vent management and hemodynamic monitoring.    NSGY ligated  shunt at bedside. Repeat CT head was stable. Patient was briefly started on a cardene gtt for hypertensive urgency, goal SBP < 180. UA+ started on meropenem. Plan per NSGY to continue conservative management with potential hazel hole in future. KO tube feeds started on 11/4. Metoprolol 25Q12 started, Labetalol Q4PRN for HTN. Due to pcn allergy meropenum given for UTI than switched to Cefepime then to Ceftriaxone.    24 HOUR EVENTS:  - family considering palliative extubation this week, ongoing GOC discussion    SUBJECTIVE/ROS:  [ ] A ten-point review of systems was otherwise negative except as noted.  [X] Due to altered mental status/intubation, subjective information were not able to be obtained from the patient. History was obtained, to the extent possible, from review of the chart and collateral sources of information.      NEURO  Exam: Unchanged from previous. Opens eyes spontaneously, responsive pupils b/l, positive gag, moves LLE spontanous, withdraws all four extremities to pain  Meds: acetaminophen    Suspension .. 975 milliGRAM(s) Oral every 6 hours PRN Mild Pain (1 - 3)  levETIRAcetam  Solution 500 milliGRAM(s) Enteral Tube two times a day    [x] Adequacy of sedation and pain control has been assessed and adjusted      RESPIRATORY  RR: 21 (16 Nov 2020 00:00) (18 - 26)  SpO2: 99% (16 Nov 2020 00:00) (97% - 100%)    Exam: CTA B/L, no wheezes, rales, rhonchi  Mechanical Ventilation: Mode: CPAP with PS, RR (patient): 20, FiO2: 30, PEEP: 5, PS: 5, MAP: 6, PIP: 11    [ ] Extubation Readiness Assessed  Meds:       CARDIOVASCULAR  HR: 92 (16 Nov 2020 00:00) (74 - 102)  BP: 140/65 (16 Nov 2020 00:00) (140/65 - 169/77)  BP(mean): 93 (16 Nov 2020 00:00) (93 - 111)  ABP: --  ABP(mean): --      Exam:  Cardiac Rhythm:RRR  Perfusion     [X]Adequate   [ ]Inadequate  Mentation   []Normal       [X]Reduced  Extremities  [X]Warm         [ ]Cool  Volume Status [ ]Hypervolemic [X]Euvolemic [ ]Hypovolemic  Meds: amLODIPine   Tablet 10 milliGRAM(s) Oral daily  metoprolol tartrate 50 milliGRAM(s) Oral every 12 hours      GI/NUTRITION  Exam: Abd soft  Diet: NPO  Meds: pantoprazole  Injectable 40 milliGRAM(s) IV Push daily  polyethylene glycol 3350 17 Gram(s) Oral two times a day  senna Syrup 10 milliLiter(s) Oral at bedtime      GENITOURINARY    14 Nov 2020 07:01  -  15 Nov 2020 07:00  --------------------------------------------------------  IN:    Enteral Tube Flush: 120 mL    Pivot 1.5: 1320 mL  Total IN: 1440 mL    OUT:    Incontinent per Collection Bag (mL): 1150 mL  Total OUT: 1150 mL    Total NET: 290 mL      15 Nov 2020 07:01  -  16 Nov 2020 00:37  --------------------------------------------------------  IN:    Enteral Tube Flush: 300 mL    Pivot 1.5: 990 mL  Total IN: 1290 mL    OUT:    Incontinent per Collection Bag (mL): 1100 mL  Total OUT: 1100 mL    Total NET: 190 mL      11-15    145  |  110<H>  |  40<H>  ----------------------------<  184<H>  4.3   |  25  |  0.44<L>    Ca    8.6      15 Nov 2020 00:25  Phos  3.0     11-15  Mg     2.6     11-15        [ ] Carrasco catheter, indication:   Meds:       HEMATOLOGIC  Meds: enoxaparin Injectable 40 milliGRAM(s) SubCutaneous daily    [x] VTE Prophylaxis                                   8.5    11.73 )-----------( 493      ( 15 Nov 2020 00:25 )             27.6     PT/INR - ( 15 Nov 2020 00:25 )   PT: 14.2 sec;   INR: 1.19 ratio         PTT - ( 15 Nov 2020 00:25 )  PTT:31.0 sec  Transfusion     [ ] PRBC   [ ] Platelets   [ ] FFP   [ ] Cryoprecipitate      INFECTIOUS DISEASES  T(C): 37.1 (15 Nov 2020 23:00), Max: 37.6 (15 Nov 2020 03:00)  T(F): 98.8 (15 Nov 2020 23:00), Max: 99.7 (15 Nov 2020 03:00)  HR: 92 (16 Nov 2020 00:00) (74 - 102)    Recent Cultures:    Meds:     ENDOCRINE  Capillary Blood Glucose    Meds: atorvastatin 40 milliGRAM(s) Oral at bedtime    ACCESS DEVICES:  [x] Peripheral IV  [ ] Central Venous Line	[ ] R	[ ] L	[ ] IJ	[ ] Fem	[ ] SC	Placed:   [ ] Arterial Line		[ ] R	[ ] L	[ ] Fem	[ ] Rad	[ ] Ax	Placed:   [ ] PICC:					[ ] Mediport  [ ] Urinary Catheter, Date Placed:   [x] Necessity of urinary, arterial, and venous catheters discussed    OTHER MEDICATIONS:  chlorhexidine 0.12% Liquid 15 milliLiter(s) Oral Mucosa every 12 hours  chlorhexidine 2% Cloths 1 Application(s) Topical <User Schedule>  nystatin Powder 1 Application(s) Topical two times a day      CODE STATUS: Yes        IMAGING: HISTORY  80F hx of dementia, hydrocephalus s/p  shunt and recent hip fracture non-operative management at SNF presents from SNF after a mechanical fall, level I trauma activated, primary survey significant for GCS 8, decision was made to intubate in trauma bay. Secondary survey significant for L forehead hematoma, R lower abdomen abrasion. CT scan significant for large L SDH with midline shift and L 8-10 rib fx. NSGY and family discussed, plan for non-operative management at this time. Patient was transferred to SICU for vent management and hemodynamic monitoring.    NSGY ligated  shunt at bedside. Repeat CT head was stable. Patient was briefly started on a cardene gtt for hypertensive urgency, goal SBP < 180. UA+ started on meropenem. Plan per NSGY to continue conservative management with potential hazel hole in future. KO tube feeds started on 11/4. Metoprolol 25Q12 started, Labetalol Q4PRN for HTN. Due to pcn allergy meropenum given for UTI than switched to Cefepime then to Ceftriaxone.    24 HOUR EVENTS:  - family considering palliative extubation this tomorrow, ongoing C discussion    SUBJECTIVE/ROS:  [ ] A ten-point review of systems was otherwise negative except as noted.  [X] Due to altered mental status/intubation, subjective information were not able to be obtained from the patient. History was obtained, to the extent possible, from review of the chart and collateral sources of information.      NEURO  Exam: Unchanged from previous. Opens eyes spontaneously, responsive pupils b/l, positive gag, moves LLE spontanous, withdraws all four extremities to pain  Meds: acetaminophen    Suspension .. 975 milliGRAM(s) Oral every 6 hours PRN Mild Pain (1 - 3)  levETIRAcetam  Solution 500 milliGRAM(s) Enteral Tube two times a day    [x] Adequacy of sedation and pain control has been assessed and adjusted      RESPIRATORY  RR: 21 (16 Nov 2020 00:00) (18 - 26)  SpO2: 99% (16 Nov 2020 00:00) (97% - 100%)    Exam: CTA B/L, no wheezes, rales, rhonchi  Mechanical Ventilation: Mode: CPAP with PS, RR (patient): 20, FiO2: 30, PEEP: 5, PS: 5, MAP: 6, PIP: 11    [ ] Extubation Readiness Assessed  Meds:       CARDIOVASCULAR  HR: 92 (16 Nov 2020 00:00) (74 - 102)  BP: 140/65 (16 Nov 2020 00:00) (140/65 - 169/77)  BP(mean): 93 (16 Nov 2020 00:00) (93 - 111)  ABP: --  ABP(mean): --      Exam:  Cardiac Rhythm:RRR  Perfusion     [X]Adequate   [ ]Inadequate  Mentation   []Normal       [X]Reduced  Extremities  [X]Warm         [ ]Cool  Volume Status [ ]Hypervolemic [X]Euvolemic [ ]Hypovolemic  Meds: amLODIPine   Tablet 10 milliGRAM(s) Oral daily  metoprolol tartrate 50 milliGRAM(s) Oral every 12 hours      GI/NUTRITION  Exam: Abd soft  Diet: NPO  Meds: pantoprazole  Injectable 40 milliGRAM(s) IV Push daily  polyethylene glycol 3350 17 Gram(s) Oral two times a day  senna Syrup 10 milliLiter(s) Oral at bedtime      GENITOURINARY    14 Nov 2020 07:01  -  15 Nov 2020 07:00  --------------------------------------------------------  IN:    Enteral Tube Flush: 120 mL    Pivot 1.5: 1320 mL  Total IN: 1440 mL    OUT:    Incontinent per Collection Bag (mL): 1150 mL  Total OUT: 1150 mL    Total NET: 290 mL      15 Nov 2020 07:01  -  16 Nov 2020 00:37  --------------------------------------------------------  IN:    Enteral Tube Flush: 300 mL    Pivot 1.5: 990 mL  Total IN: 1290 mL    OUT:    Incontinent per Collection Bag (mL): 1100 mL  Total OUT: 1100 mL    Total NET: 190 mL      11-15    145  |  110<H>  |  40<H>  ----------------------------<  184<H>  4.3   |  25  |  0.44<L>    Ca    8.6      15 Nov 2020 00:25  Phos  3.0     11-15  Mg     2.6     11-15        [ ] Carrasco catheter, indication:   Meds:       HEMATOLOGIC  Meds: enoxaparin Injectable 40 milliGRAM(s) SubCutaneous daily    [x] VTE Prophylaxis                                   8.5    11.73 )-----------( 493      ( 15 Nov 2020 00:25 )             27.6     PT/INR - ( 15 Nov 2020 00:25 )   PT: 14.2 sec;   INR: 1.19 ratio         PTT - ( 15 Nov 2020 00:25 )  PTT:31.0 sec  Transfusion     [ ] PRBC   [ ] Platelets   [ ] FFP   [ ] Cryoprecipitate      INFECTIOUS DISEASES  T(C): 37.1 (15 Nov 2020 23:00), Max: 37.6 (15 Nov 2020 03:00)  T(F): 98.8 (15 Nov 2020 23:00), Max: 99.7 (15 Nov 2020 03:00)  HR: 92 (16 Nov 2020 00:00) (74 - 102)    Recent Cultures:    Meds:     ENDOCRINE  Capillary Blood Glucose    Meds: atorvastatin 40 milliGRAM(s) Oral at bedtime    ACCESS DEVICES:  [x] Peripheral IV  [ ] Central Venous Line	[ ] R	[ ] L	[ ] IJ	[ ] Fem	[ ] SC	Placed:   [ ] Arterial Line		[ ] R	[ ] L	[ ] Fem	[ ] Rad	[ ] Ax	Placed:   [ ] PICC:					[ ] Mediport  [ ] Urinary Catheter, Date Placed:   [x] Necessity of urinary, arterial, and venous catheters discussed    OTHER MEDICATIONS:  chlorhexidine 0.12% Liquid 15 milliLiter(s) Oral Mucosa every 12 hours  chlorhexidine 2% Cloths 1 Application(s) Topical <User Schedule>  nystatin Powder 1 Application(s) Topical two times a day      CODE STATUS: Yes        IMAGING:

## 2020-11-16 NOTE — PROGRESS NOTE ADULT - ASSESSMENT
81 y/o  woman with dementia, hydrocephalus s/p VPS admitted after fall at nursing facility with LOC and unresponsiveness.  Initial CTH with acute SDH L>R and on interhemispheric fissure and tentorial leaves with rightward shift 11mm decreased to 7mm. Repeat CTH with new R inferior cerebellar hypodensity concerning for infarct. Neuro exam without appreciable interval change: intubated, C-collar, no verbal output, not following commands, movement of extremities L>R w/ noxious stimuli. EEG with L slowing and R periodic discharges but no seizures, LDL 84, HbA1C 5.6, LE doppler neg for dvt. no significant change in exam. has some LUE movement    Recommendations:  - SBP goal <160; SBP goal 100-160 given SDH  - c/w Keppra 500mg BID indefinitely for now given EEG results  - Hold antiplatelets given SDH; consider when cleared from nsx standpoint.  - Lipitor 80mg can be reduced to 40mg  -Telemetry monitoring  - SBP goal normotensive, less than 160/90.   - MRI/MRA when able if in agreement with GOC of family unlikely to .   - GOC discussion pending, likely needs trach/PEG, son doesn't want surgery. f/u with family  - tele  - PT/OT/SS/SLP, OOBC when able  - check FS, glucose control <180  - GI/DVT ppx  - Counseling on diet, exercise, and medication adherence was done  - Counseling on smoking cessation and alcohol consumption offered when appropriate.  - Pain assessed and judicious use of narcotics when appropriate was discussed.    - Stroke education given when appropriate.  - Importance of fall prevention discussed.   - Differential diagnosis and plan of care discussed with patient and/or family and primary team  - Thank you for allowing me to participate in the care of this patient. Call with questions.   - will f/u PRN or as questions arise  Ricki Mcmanus MD  Vascular Neurology  Office: [phone]

## 2020-11-16 NOTE — PROGRESS NOTE ADULT - SUBJECTIVE AND OBJECTIVE BOX
SUBJECTIVE:   Seen and examined at bedside. No acute events overnight.     OBJECTIVE: T(C): 36.9 (11-16-20 @ 11:00), Max: 37.3 (11-16-20 @ 07:00)  HR: 87 (11-16-20 @ 13:00) (74 - 102)  BP: 126/59 (11-16-20 @ 13:00) (118/64 - 158/74)  RR: 22 (11-16-20 @ 13:00) (18 - 26)  SpO2: 100% (11-16-20 @ 13:00) (97% - 100%)  Wt(kg): --  I&O's Summary    15 Nov 2020 07:01  -  16 Nov 2020 07:00  --------------------------------------------------------  IN: 1660 mL / OUT: 1200 mL / NET: 460 mL    16 Nov 2020 07:01  -  16 Nov 2020 13:12  --------------------------------------------------------  IN: 330 mL / OUT: 0 mL / NET: 330 mL      I&O's Detail    15 Nov 2020 07:01  -  16 Nov 2020 07:00  --------------------------------------------------------  IN:    Enteral Tube Flush: 340 mL    Pivot 1.5: 1320 mL  Total IN: 1660 mL    OUT:    Incontinent per Collection Bag (mL): 1200 mL  Total OUT: 1200 mL    Total NET: 460 mL      16 Nov 2020 07:01  -  16 Nov 2020 13:12  --------------------------------------------------------  IN:    Pivot 1.5: 330 mL  Total IN: 330 mL    OUT:  Total OUT: 0 mL    Total NET: 330 mL        General: withdraws to pain, intubated   Resp: CTA B/L, intubated   Abdomen: soft, mildly distended, nontender  Vasc: WWP     MEDICATIONS  (STANDING):  amLODIPine   Tablet 10 milliGRAM(s) Oral daily  atorvastatin 40 milliGRAM(s) Oral at bedtime  chlorhexidine 0.12% Liquid 15 milliLiter(s) Oral Mucosa every 12 hours  chlorhexidine 2% Cloths 1 Application(s) Topical <User Schedule>  enoxaparin Injectable 40 milliGRAM(s) SubCutaneous daily  levETIRAcetam  Solution 500 milliGRAM(s) Enteral Tube two times a day  metoprolol tartrate 50 milliGRAM(s) Oral every 12 hours  nystatin Powder 1 Application(s) Topical two times a day  pantoprazole  Injectable 40 milliGRAM(s) IV Push daily  polyethylene glycol 3350 17 Gram(s) Oral daily  senna Syrup 10 milliLiter(s) Oral at bedtime    MEDICATIONS  (PRN):  acetaminophen    Suspension .. 975 milliGRAM(s) Oral every 6 hours PRN Mild Pain (1 - 3)      LABS:                        9.2    11.74 )-----------( 543      ( 16 Nov 2020 00:41 )             30.0     11-16    145  |  110<H>  |  43<H>  ----------------------------<  165<H>  4.4   |  24  |  0.46<L>    Ca    9.2      16 Nov 2020 00:41  Phos  3.0     11-16  Mg     2.6     11-16      PT/INR - ( 16 Nov 2020 00:41 )   PT: 14.3 sec;   INR: 1.20 ratio         PTT - ( 16 Nov 2020 00:41 )  PTT:31.4 sec      RADIOLOGY & ADDITIONAL STUDIES:  < from: Xray Chest 1 View- PORTABLE-Routine (Xray Chest 1 View- PORTABLE-Routine in AM.) (11.15.20 @ 07:17) >  Impression:    The heart is normal in size. Left lower lobe pneumonia and/or atelectasis. The right lung is clear. Endotracheal tube is just above the deisy and should be withdrawn Slightly. NG tube is in the stomach however its tip is not seen on the current study.                CLAUDE LOZADA MD; Attending Radiologist  This document has been electronically signed. Nov 15 2020  9:33AM    < end of copied text >

## 2020-11-16 NOTE — PROGRESS NOTE ADULT - SUBJECTIVE AND OBJECTIVE BOX
Neurology Progress Note    S: Patient seen and examined. No new events overnight.  awaiting family decision on trach/peg    Medication:  acetaminophen    Suspension .. 975 milliGRAM(s) Oral every 6 hours PRN  amLODIPine   Tablet 10 milliGRAM(s) Oral daily  atorvastatin 40 milliGRAM(s) Oral at bedtime  chlorhexidine 0.12% Liquid 15 milliLiter(s) Oral Mucosa every 12 hours  chlorhexidine 2% Cloths 1 Application(s) Topical <User Schedule>  enoxaparin Injectable 40 milliGRAM(s) SubCutaneous daily  levETIRAcetam  Solution 500 milliGRAM(s) Enteral Tube two times a day  metoprolol tartrate 50 milliGRAM(s) Oral every 12 hours  nystatin Powder 1 Application(s) Topical two times a day  pantoprazole  Injectable 40 milliGRAM(s) IV Push daily  polyethylene glycol 3350 17 Gram(s) Oral two times a day  senna Syrup 10 milliLiter(s) Oral at bedtime      Vitals:  Vital Signs Last 24 Hrs  T(C): 37.3 (16 Nov 2020 07:00), Max: 37.3 (16 Nov 2020 07:00)  T(F): 99.1 (16 Nov 2020 07:00), Max: 99.1 (16 Nov 2020 07:00)  HR: 81 (16 Nov 2020 09:00) (74 - 102)  BP: 125/59 (16 Nov 2020 09:00) (123/61 - 169/77)  BP(mean): 85 (16 Nov 2020 09:00) (84 - 111)  RR: 23 (16 Nov 2020 09:00) (18 - 26)  SpO2: 100% (16 Nov 2020 09:00) (97% - 100%)    General Exam:   General Appearance: Appropriately dressed on vent in icu   Head: Normocephalic, atraumatic and no dysmorphic features  Ear, Nose, and Throat: Moist mucous membranes, c collar  CVS: S1S2+  Resp: intubated   Extremeties: No edema or cyanosis  Skin: No bruises or rashes     Neurological (>12):  MS: Intubated, not following commands, no response to vocal stimulation. Grimaces to sternal rub.   Language: Intubated.   CNs: (R = 2mm, L = 2mm) Poorly reactive, right eye exotropia.  No noted blink to threat. Corneal reflex intact. No facial asymmetry b/l.   Motor: Increased tone in the LLE in extension. no tremors at this time.  moves LUE AG only  Sensory: Withdrawal to noxious stimuli in all 4 extremities LLE>RLE. Reduced withdrawal in the LLE.   Coordination: unable to test  Gait: Deferred     I personally reviewed the below data/images/labs:      CBC Full  -  ( 16 Nov 2020 00:41 )  WBC Count : 11.74 K/uL  RBC Count : 3.40 M/uL  Hemoglobin : 9.2 g/dL  Hematocrit : 30.0 %  Platelet Count - Automated : 543 K/uL  Mean Cell Volume : 88.2 fl  Mean Cell Hemoglobin : 27.1 pg  Mean Cell Hemoglobin Concentration : 30.7 gm/dL  Auto Neutrophil # : x  Auto Lymphocyte # : x  Auto Monocyte # : x  Auto Eosinophil # : x  Auto Basophil # : x  Auto Neutrophil % : x  Auto Lymphocyte % : x  Auto Monocyte % : x  Auto Eosinophil % : x  Auto Basophil % : x    11-16    145  |  110<H>  |  43<H>  ----------------------------<  165<H>  4.4   |  24  |  0.46<L>    Ca    9.2      16 Nov 2020 00:41  Phos  3.0     11-16  Mg     2.6     11-16        PT/INR - ( 16 Nov 2020 00:41 )   PT: 14.3 sec;   INR: 1.20 ratio         PTT - ( 16 Nov 2020 00:41 )  PTT:31.4 sec          < from: TTE with Doppler (w/Cont) (11.07.20 @ 10:30) >  OBSERVATIONS:  Mitral Valve: Mild mitral annular calcification. Mild  mitral regurgitation.  Aortic Root: The aortic root was not well seen.  Aortic Valve: The aortic valve is not visualized in short  axis imaging for anatomic evaluation.  There is no aortic stenosis. Peak transaortic valve  gradient equals 10 mm Hg, mean transaortic valve gradient  equals 5 mm Hg, aortic valve velocity time integral equals  30 cm. Mild aortic regurgitation.  Peak left ventricular  outflow tract gradient equals 3 mm Hg, mean gradient is  equal to 1 mm Hg, LVOT velocity time integral equals 17 cm.  Left Atrium: Mildly dilated left atrium.  LA volume index =  41 cc/m2.  Left Ventricle: Normal left ventricular systolic function.  No segmental wall motion abnormalities.  The LVEF= 55-60%.  Normal left ventricular size. Diastolic dysfunction with  elevated left atrial pressures.  Right Heart: Normal right atrial size.  The right atrial area= 14cm2, the right atrial volume=  35ml. Normal right ventricular size and systolic function.  Normal tricuspid valve. Minimal tricuspid regurgitation.  Pulmonic valve not well visualized.  Pericardium/PleuraThere is no pericardial effusion.  Hemodynamic: The estimated right atrial pressure is normal.  There is no shunt at the atrial level with agitated saline.  ------------------------------------------------------------------------  CONCLUSIONS:  1. There is no shunt at the atrial level with agitated  saline.  2. Diastolic dysfunction with elevated left atrial  pressures.  *** Limited echo windows, patient is intubated and supine.   Poor parasternal views.  No previous Echo exam.  ------------------------------------------------------------------------  PROCEDURE DESCRIPTION: Transthoracic echocardiogram with  2-D, M-Mode and complete spectral and color flow Doppler.  Patient was injected with 10 cc's of aerosolized saline.  Patient was injected with 10 cc's of aerosolized saline.  Verbal consent was obtained for injection of  Ultrasonic  Enhancing Agent following a discussion of risks and  benefits. Following intravenous injection of Ultrasonic  Enhancing Agent , harmonic imaging was performed.  ------------------------------------------------------------------------  ECHOCARDIOGRAPHIC EXAMINATION:  LEFT ATRIUM:  LA Volume Index: 41.00 cc/sqm  LA Volume: 74.6 cc  HR and BP:  HR: 90 bpm  BP: 191/86  BSA: 1.80  ------------------------------------------------------------------------  HEMODYNAMICS:  RA Pressure Estimate: 8  ------------------------------------------------------------------------  COLOR FLOW and SPECIAL DOPPLER:  LVOT:  LVOT Velocity: .8 M/sec  LVOT Peak Gradient Rest: 2 mm Hg  LVOT Mean Gradient Rest: 1mm Hg  ------------------------------------------------------------------------  DIASTOLIC FUNCTION:  DT:470 ms  E/A: 0.71  e' Septal: 5.0 cm/s  E/e' Septal: 14.2  e' Lateral: 4.0 cm/s  E/e' Lateral: 17.7  MV E wave: 0.7 m/s  MV A wave: 1.0 m/s  Septal a': 12.0 cm/s  Lateral a': 15.0 cm/s    < end of copied text >        EEG Classification / Summary:  Abnormal EEG study  Moderate background slowing, amplitudes reduced over the left.  Frequent runs of 1hz Left central max periodic epileptiform discharges were present.    -----------------------------------------------------------------------------------------------------    Clinical Impression:  High risk of focal onset seizures from the left paracentral region. Seizure prophylaxis recommended.  Moderate multifocal cerebral dysfunction, with fluid attenuation effect on the left.   Radiology (XR, CT, MR, U/S, TTE/JUNAID):    < from: CT Head No Cont (11.04.20 @ 13:13) >  FINDINGS:    Redemonstration of right parietal approach ventriculostomy catheter in unchanged position.    Acute left lateral convexity subdural hemorrhage measures 1.7 cm in greatest depth, decreased from 1.9 cm.    Acute right lateral convexity subdural hemorrhage measures 6 mm in greatest depth, similar to the prior examination.    Acute subdural hemorrhage in the interhemispheric fissure measures 9 mm in greatest thickness, previously measuring 11 mm    Acute subdural hemorrhage along the tentorial leaves is similar, measuring4 to 5 mm in greatest thickness on the left.    Rightward midline shift of 7 mm is decreased from 11 mm. Increased ventricular size, no large hydrocephalus. Basal cisterns are more well visualized on the current examination.    Similar nonspecific 6 mm focus of increased attenuation in the left corona radiata (2-22).    New foci of low density in the right inferior cerebellar hemisphere, suspicious for acute infarction. No CT evidence for hemorrhagic transformation.    Similar extensive white matter microvascular ischemic disease.    No displaced calvarial fracture. The visualized paranasal sinuses and mastoid air cells are clear.    IMPRESSION:  New foci of low density in the right inferior cerebellar hemisphere, suspicious for acute infarction. No CT evidence for hemorrhagic transformation.    Decreased size of left lateral convexity subdural hemorrhage. Similar size of right lateral convexity subdural hemorrhage.    Decreased size of interhemispheric subdural hemorrhage. Similar tentorial subdural hemorrhage.    Decreased rightward midline shift, currently 7 mm, previously 11 mm.    Increased ventricular size, no large hydrocephalus. Basal cisterns are more well visualized on the current examination.    < end of copied text >    < from: CT Head No Cont (11.03.20 @ 23:29) >  Bilateral supratentorial subdural hematomas are again identified. The subdural hematoma on the left side measures approximately 2.2 cm in widest diameter and on the right side measures approximately 0.5 cm in widest diameter. Acute subdural hematoma along the interhemispheric region is seen along the left side and measures approximately 0.9 cm widest diameter. Acute subdural hematomas along bilateral tentorial region are again seen as well. There is mass effect on left lateral ventricle. Left-to-right shift (1.1 cm) is again seen.    Right parietal shunt catheter is again seen. This catheter appears unchanged in position.    Evaluation of the osseous structures with the appropriate window appears normal    The visualized paranasal sinuses mastoid and middle ear regions appear clear.    IMPRESSION: No stiffing change when allowing for differences in technique.    < end of copied text >    EEG Classification / Summary:  Abnormal EEG study  Moderate background slowing, amplitudes reduced over the left.  Intermittent 0.5 to 1hz right periodic discharges, higher amplitudes over the right    -----------------------------------------------------------------------------------------------------    Clinical Impression:  Risk of focal onset seizures from the frontal regions.   Left hemispheric structural or functional abnormality  Moderate multifocal cerebral dysfunction, with fluid attenuation effect on the left.       11-10-20  Clinical Impression:  Risk of focal onset seizures from the frontal regions.   Left hemispheric structural or functional abnormality  Moderate multifocal cerebral dysfunction, with fluid attenuation effect on the left.   No change vs prior day.  No seizures x 3 days

## 2020-11-17 DIAGNOSIS — R06.82 TACHYPNEA, NOT ELSEWHERE CLASSIFIED: ICD-10-CM

## 2020-11-17 LAB
ANION GAP SERPL CALC-SCNC: 10 MMOL/L — SIGNIFICANT CHANGE UP (ref 5–17)
APTT BLD: 30.7 SEC — SIGNIFICANT CHANGE UP (ref 27.5–35.5)
BUN SERPL-MCNC: 48 MG/DL — HIGH (ref 7–23)
CALCIUM SERPL-MCNC: 9.1 MG/DL — SIGNIFICANT CHANGE UP (ref 8.4–10.5)
CHLORIDE SERPL-SCNC: 113 MMOL/L — HIGH (ref 96–108)
CO2 SERPL-SCNC: 24 MMOL/L — SIGNIFICANT CHANGE UP (ref 22–31)
CREAT SERPL-MCNC: 0.5 MG/DL — SIGNIFICANT CHANGE UP (ref 0.5–1.3)
GLUCOSE SERPL-MCNC: 186 MG/DL — HIGH (ref 70–99)
HCT VFR BLD CALC: 30.1 % — LOW (ref 34.5–45)
HGB BLD-MCNC: 9.1 G/DL — LOW (ref 11.5–15.5)
INR BLD: 1.22 RATIO — HIGH (ref 0.88–1.16)
MAGNESIUM SERPL-MCNC: 2.6 MG/DL — SIGNIFICANT CHANGE UP (ref 1.6–2.6)
MCHC RBC-ENTMCNC: 27 PG — SIGNIFICANT CHANGE UP (ref 27–34)
MCHC RBC-ENTMCNC: 30.2 GM/DL — LOW (ref 32–36)
MCV RBC AUTO: 89.3 FL — SIGNIFICANT CHANGE UP (ref 80–100)
NRBC # BLD: 0 /100 WBCS — SIGNIFICANT CHANGE UP (ref 0–0)
PHOSPHATE SERPL-MCNC: 3.1 MG/DL — SIGNIFICANT CHANGE UP (ref 2.5–4.5)
PLATELET # BLD AUTO: 528 K/UL — HIGH (ref 150–400)
POTASSIUM SERPL-MCNC: 4.4 MMOL/L — SIGNIFICANT CHANGE UP (ref 3.5–5.3)
POTASSIUM SERPL-SCNC: 4.4 MMOL/L — SIGNIFICANT CHANGE UP (ref 3.5–5.3)
PROTHROM AB SERPL-ACNC: 14.5 SEC — HIGH (ref 10.6–13.6)
RBC # BLD: 3.37 M/UL — LOW (ref 3.8–5.2)
RBC # FLD: 16 % — HIGH (ref 10.3–14.5)
SODIUM SERPL-SCNC: 147 MMOL/L — HIGH (ref 135–145)
WBC # BLD: 11.93 K/UL — HIGH (ref 3.8–10.5)
WBC # FLD AUTO: 11.93 K/UL — HIGH (ref 3.8–10.5)

## 2020-11-17 PROCEDURE — 99233 SBSQ HOSP IP/OBS HIGH 50: CPT

## 2020-11-17 PROCEDURE — 99232 SBSQ HOSP IP/OBS MODERATE 35: CPT | Mod: GC

## 2020-11-17 RX ORDER — MORPHINE SULFATE 50 MG/1
2 CAPSULE, EXTENDED RELEASE ORAL
Refills: 0 | Status: DISCONTINUED | OUTPATIENT
Start: 2020-11-17 | End: 2020-11-19

## 2020-11-17 RX ADMIN — CHLORHEXIDINE GLUCONATE 15 MILLILITER(S): 213 SOLUTION TOPICAL at 17:43

## 2020-11-17 RX ADMIN — PANTOPRAZOLE SODIUM 40 MILLIGRAM(S): 20 TABLET, DELAYED RELEASE ORAL at 12:07

## 2020-11-17 RX ADMIN — SENNA PLUS 10 MILLILITER(S): 8.6 TABLET ORAL at 21:21

## 2020-11-17 RX ADMIN — NYSTATIN CREAM 1 APPLICATION(S): 100000 CREAM TOPICAL at 06:27

## 2020-11-17 RX ADMIN — CHLORHEXIDINE GLUCONATE 1 APPLICATION(S): 213 SOLUTION TOPICAL at 06:27

## 2020-11-17 RX ADMIN — ATORVASTATIN CALCIUM 40 MILLIGRAM(S): 80 TABLET, FILM COATED ORAL at 21:21

## 2020-11-17 RX ADMIN — LEVETIRACETAM 500 MILLIGRAM(S): 250 TABLET, FILM COATED ORAL at 06:27

## 2020-11-17 RX ADMIN — Medication 50 MILLIGRAM(S): at 17:42

## 2020-11-17 RX ADMIN — CHLORHEXIDINE GLUCONATE 15 MILLILITER(S): 213 SOLUTION TOPICAL at 06:26

## 2020-11-17 RX ADMIN — LEVETIRACETAM 500 MILLIGRAM(S): 250 TABLET, FILM COATED ORAL at 17:43

## 2020-11-17 RX ADMIN — AMLODIPINE BESYLATE 10 MILLIGRAM(S): 2.5 TABLET ORAL at 06:26

## 2020-11-17 RX ADMIN — ENOXAPARIN SODIUM 40 MILLIGRAM(S): 100 INJECTION SUBCUTANEOUS at 12:07

## 2020-11-17 RX ADMIN — NYSTATIN CREAM 1 APPLICATION(S): 100000 CREAM TOPICAL at 17:43

## 2020-11-17 RX ADMIN — Medication 50 MILLIGRAM(S): at 06:26

## 2020-11-17 NOTE — CHART NOTE - NSCHARTNOTEFT_GEN_A_CORE
Nutrition Follow Up Note    Source: EMR, RN. Pt is intubated     Chart reviewed, events noted. Per chart:  - Family planning for palliative extubation Wednesday night.  - Palliative care team reconsulted, to see patient and speak with family today.    Enteral nutrition: Pivot 1.5 @ 55 ml/hr x 24 hours  - Provides: 1320 ml formula, 1980 calories (26 flower/kg), 124 grams protein (1.6 gm/kg), 1002 ml free water, based on dosing wt 75.2kg. Meets nutrition needs.    Nutrition-related interim events:   - Per chart, tube feeds at goal for >5 days, seen infusing at goal  - Per RN, tolerating well but starting to have loose stools  - Last BM today; bowel regimen: Miralax daily, senna daily    Daily Weight in k (-17), Weight in k.6 (-16), Weight in k.2 (-15), Weight in k.6 (-14), Weight in k.6 (-13), Weight in k.8 (-12)    Pertinent Medications: MEDICATIONS  (STANDING):  amLODIPine   Tablet 10 milliGRAM(s) Oral daily  atorvastatin 40 milliGRAM(s) Oral at bedtime  chlorhexidine 0.12% Liquid 15 milliLiter(s) Oral Mucosa every 12 hours  chlorhexidine 2% Cloths 1 Application(s) Topical <User Schedule>  enoxaparin Injectable 40 milliGRAM(s) SubCutaneous daily  levETIRAcetam  Solution 500 milliGRAM(s) Enteral Tube two times a day  metoprolol tartrate 50 milliGRAM(s) Oral every 12 hours  nystatin Powder 1 Application(s) Topical two times a day  pantoprazole  Injectable 40 milliGRAM(s) IV Push daily  polyethylene glycol 3350 17 Gram(s) Oral daily  senna Syrup 10 milliLiter(s) Oral at bedtime    MEDICATIONS  (PRN):  acetaminophen    Suspension .. 975 milliGRAM(s) Oral every 6 hours PRN Mild Pain (1 - 3)  LORazepam   Injectable 0.2 milliGRAM(s) IV Push every 1 hour PRN anxiety/agitation/refractory dyspnea  morphine  - Injectable 2 milliGRAM(s) IV Push every 1 hour PRN dyspnea    Pertinent Labs:  @ 05:23: Na 147<H>, BUN 48<H>, Cr 0.50, <H>, K+ 4.4, Phos 3.1, Mg 2.6, Alk Phos --, ALT/SGPT --, AST/SGOT --, HbA1c --    Skin per nursing documentation: forehead laceration wound, no pressure injuries  Edema per nursing documentation: () 3+ generalized    Estimated Needs: based on dosing wt 75.2kg with consideration for intubation  Energy: 4237-6595 calories (20-25 flower/kg)  Protein: 105-120 grams (1.4-1.6 gm/kg)  Terral State Equation (REE): 1437 calories (19 flower/kg)    Previous Nutrition Diagnosis: Increased Nutrient Needs  Nutrition Diagnosis is: ongoing, addressed with EN at goal    New Nutrition Diagnosis: none    Recommend  1) If within GOC, continue tube feeds as ordered, Pivot 1.5 @ 55 ml/hr x 24 hours meets estimated nutritional requirements   2) Consider adjusting/discontinuing bowel regimen as RN reporting pt with diarrhea  3) Monitor GOC discussions    Continue monitoring and evaluating:   - Labs: blood glucose, electrolytes, renal indices  - GI function and BMs   - Nutritional intake, weight trends, skin integrity    RD remains available PRN and will follow up per protocol.  Ana Garber RD CDN    Pager# [phone]

## 2020-11-17 NOTE — PROGRESS NOTE ADULT - ASSESSMENT
Assessment:   80 year old female, with a medical history significant for dementia, not on A/C, BIBEMS unresponsive with head injury, reportedly fell, intubated in the ED to protect airway (GS=8), imaging studies showed left acute SDH, repeat CT after  shunt ligation shows stable SDH and left to right shift. CT head 4/11 revealed decreased subdural hematoma and midline shift 11mm-> 7 mm. New low density cerebellar focus suspicious for infarcts. Pt neuro status improved, following commands and opens the eyes spontaneously.      Plan:   - Pain control   - Appreciate neurosurgery:  Conservative management.   - Follow up with family meeting discussing of GOC, palliative extubation on Wednesday    - Appreciate SICU care  - DNR    ACS  [id number]

## 2020-11-17 NOTE — CHART NOTE - NSCHARTNOTEFT_GEN_A_CORE
As per Dr. Johnson, may remove cervical collar as patient's CT cervical spine was negative for any acute fracture or traumatic subluxation. Patient's cervical collar was removed by Dr. Johnson.            < from: CT Cervical Spine No Cont (11.03.20 @ 22:24) >    Cervical spine CT: No acute fracture. Cervical degenerative spondylosis, As per Dr. Johnson, may remove cervical collar as patient's CT cervical spine was negative for any acute fracture or traumatic subluxation. As per discussions with pt's son and palliative care, the plan is for compassionate extubation tomorrow late evening. Patient's cervical collar was removed by Dr. Johnson.        < from: CT Cervical Spine No Cont (11.03.20 @ 22:24) >    Cervical spine CT: No acute fracture. Cervical degenerative spondylosis,

## 2020-11-17 NOTE — PROGRESS NOTE ADULT - SUBJECTIVE AND OBJECTIVE BOX
SUBJECTIVE:   Patient seen and examined at bedside during AM rounds. No acute events overnight.  Intubated and sedated.     OBJECTIVE: T(C): 37.6 (11-17-20 @ 11:00), Max: 37.6 (11-17-20 @ 11:00)  HR: 96 (11-17-20 @ 12:00) (77 - 116)  BP: 140/68 (11-17-20 @ 12:00) (114/62 - 168/79)  RR: 29 (11-17-20 @ 12:00) (20 - 29)  SpO2: 97% (11-17-20 @ 12:00) (95% - 100%)  Wt(kg): --  I&O's Summary    16 Nov 2020 07:01  -  17 Nov 2020 07:00  --------------------------------------------------------  IN: 1480 mL / OUT: 1000 mL / NET: 480 mL    17 Nov 2020 07:01  -  17 Nov 2020 12:30  --------------------------------------------------------  IN: 220 mL / OUT: 0 mL / NET: 220 mL      I&O's Detail    16 Nov 2020 07:01  -  17 Nov 2020 07:00  --------------------------------------------------------  IN:    Enteral Tube Flush: 160 mL    Pivot 1.5: 1320 mL  Total IN: 1480 mL    OUT:    Incontinent per Collection Bag (mL): 1000 mL  Total OUT: 1000 mL    Total NET: 480 mL      17 Nov 2020 07:01  -  17 Nov 2020 12:30  --------------------------------------------------------  IN:    Pivot 1.5: 220 mL  Total IN: 220 mL    OUT:  Total OUT: 0 mL    Total NET: 220 mL        General: intubated and sedated, withdraws to pain  Resp: intubated, CTA B/L  Abdomen: soft, nontender, nondistended  Vasc: WWP    MEDICATIONS  (STANDING):  amLODIPine   Tablet 10 milliGRAM(s) Oral daily  atorvastatin 40 milliGRAM(s) Oral at bedtime  chlorhexidine 0.12% Liquid 15 milliLiter(s) Oral Mucosa every 12 hours  chlorhexidine 2% Cloths 1 Application(s) Topical <User Schedule>  enoxaparin Injectable 40 milliGRAM(s) SubCutaneous daily  levETIRAcetam  Solution 500 milliGRAM(s) Enteral Tube two times a day  metoprolol tartrate 50 milliGRAM(s) Oral every 12 hours  nystatin Powder 1 Application(s) Topical two times a day  pantoprazole  Injectable 40 milliGRAM(s) IV Push daily  polyethylene glycol 3350 17 Gram(s) Oral daily  senna Syrup 10 milliLiter(s) Oral at bedtime    MEDICATIONS  (PRN):  acetaminophen    Suspension .. 975 milliGRAM(s) Oral every 6 hours PRN Mild Pain (1 - 3)  LORazepam   Injectable 0.2 milliGRAM(s) IV Push every 1 hour PRN anxiety/agitation/refractory dyspnea  morphine  - Injectable 2 milliGRAM(s) IV Push every 1 hour PRN dyspnea      LABS:                        9.1    11.93 )-----------( 528      ( 17 Nov 2020 05:23 )             30.1     11-17    147<H>  |  113<H>  |  48<H>  ----------------------------<  186<H>  4.4   |  24  |  0.50    Ca    9.1      17 Nov 2020 05:23  Phos  3.1     11-17  Mg     2.6     11-17      PT/INR - ( 17 Nov 2020 05:23 )   PT: 14.5 sec;   INR: 1.22 ratio         PTT - ( 17 Nov 2020 05:23 )  PTT:30.7 sec

## 2020-11-17 NOTE — PROGRESS NOTE ADULT - ASSESSMENT
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 She is intubated, CT A/P with ?new vs. confirmed evidence metastatic renal cell carcinoma. Currently with no apparent mental status off sedation.

## 2020-11-17 NOTE — PROGRESS NOTE ADULT - SUBJECTIVE AND OBJECTIVE BOX
SUBJECTIVE AND OBJECTIVE:  INTERVAL HPI/OVERNIGHT EVENTS:    DNR on chart: Yes      Allergies    penicillin (Unknown)    Intolerances    MEDICATIONS  (STANDING):  amLODIPine   Tablet 10 milliGRAM(s) Oral daily  atorvastatin 40 milliGRAM(s) Oral at bedtime  chlorhexidine 0.12% Liquid 15 milliLiter(s) Oral Mucosa every 12 hours  chlorhexidine 2% Cloths 1 Application(s) Topical <User Schedule>  enoxaparin Injectable 40 milliGRAM(s) SubCutaneous daily  levETIRAcetam  Solution 500 milliGRAM(s) Enteral Tube two times a day  metoprolol tartrate 50 milliGRAM(s) Oral every 12 hours  nystatin Powder 1 Application(s) Topical two times a day  pantoprazole  Injectable 40 milliGRAM(s) IV Push daily  polyethylene glycol 3350 17 Gram(s) Oral daily  senna Syrup 10 milliLiter(s) Oral at bedtime    MEDICATIONS  (PRN):  acetaminophen    Suspension .. 975 milliGRAM(s) Oral every 6 hours PRN Mild Pain (1 - 3)      ITEMS UNCHECKED ARE NOT PRESENT    PRESENT SYMPTOMS: [ ]Unable to obtain due to poor mentation   Source if other than patient:  [ ]Family   [ ]Team     Pain:  [ ]yes [ ]no  QOL impact -   Location -                    Aggravating factors -  Quality -  Radiation -  Timing-  Severity (0-10 scale):  Minimal acceptable level (0-10 scale):     Dyspnea:                           [ ]Mild [ ]Moderate [ ]Severe  Anxiety:                             [ ]Mild [ ]Moderate [ ]Severe  Fatigue:                             [ ]Mild [ ]Moderate [ ]Severe  Nausea:                             [ ]Mild [ ]Moderate [ ]Severe  Loss of appetite:              [ ]Mild [ ]Moderate [ ]Severe  Constipation:                    [ ]Mild [ ]Moderate [ ]Severe    CPOT:    https://www.[facility].org/getattachment/[email or web address]/Critical-Care-Pain-Observation-Tool-(CPOT)    PAIN AD Score:	  http://geriatrictoolkit.[place].[facility]/cog/painad.pdf (Ctrl + left click to view)    Other Symptoms:  [ ]All other review of systems negative     Palliative Performance Status Version 2:         %      http://npcrc.org/files/news/palliative_performance_scale_ppsv2.pdf  PHYSICAL EXAM:  Vital Signs Last 24 Hrs  T(C): 37 (17 Nov 2020 07:00), Max: 37.2 (16 Nov 2020 15:00)  T(F): 98.6 (17 Nov 2020 07:00), Max: 99 (16 Nov 2020 15:00)  HR: 96 (17 Nov 2020 10:00) (77 - 116)  BP: 142/69 (17 Nov 2020 10:00) (114/62 - 168/79)  BP(mean): 99 (17 Nov 2020 10:00) (84 - 114)  RR: 28 (17 Nov 2020 10:00) (20 - 28)  SpO2: 98% (17 Nov 2020 10:00) (97% - 100%) I&O's Summary    16 Nov 2020 07:01  -  17 Nov 2020 07:00  --------------------------------------------------------  IN: 1480 mL / OUT: 1000 mL / NET: 480 mL    17 Nov 2020 07:01  -  17 Nov 2020 10:54  --------------------------------------------------------  IN: 55 mL / OUT: 0 mL / NET: 55 mL       GENERAL:  [ ]Alert  [ ]Oriented x   [ ]Lethargic  [ ]Cachexia  [ ]Unarousable  [ ]Verbal  [ ]Non-Verbal  Behavioral:   [ ]Anxiety  [ ]Delirium [ ]Agitation [ ]Other  HEENT:  [ ]Normal   [ ]Dry mouth   [ ]ET Tube/Trach  [ ]Oral lesions  PULMONARY:   [ ]Clear [ ]Tachypnea  [ ]Audible excessive secretions   [ ]Rhonchi        [ ]Right [ ]Left [ ]Bilateral  [ ]Crackles        [ ]Right [ ]Left [ ]Bilateral  [ ]Wheezing     [ ]Right [ ]Left [ ]Bilateral  [ ]Diminished BS [ ] Right [ ]Left [ ]Bilateral  CARDIOVASCULAR:    [ ]Regular [ ]Irregular [ ]Tachy  [ ]Micah [ ]Murmur [ ]Other  GASTROINTESTINAL:  [ ]Soft  [ ]Distended   [ ]+BS  [ ]Non tender [ ]Tender  [ ]PEG [ ]OGT/ NGT   Last BM:      GENITOURINARY:  [ ]Normal [ ]Incontinent   [ ]Oliguria/Anuria   [ ][place]co  MUSCULOSKELETAL:   [ ]Normal   [ ]Weakness  [ ]Bed/Wheelchair bound [ ]Edema  NEUROLOGIC:   [ ]No focal deficits  [ ] Cognitive impairment  [ ] Dysphagia [ ]Dysarthria [ ] Paresis [ ]Other   SKIN:   [ ]Normal  [ ]Rash   [ ]Pressure ulcer(s) [ ]y [ ]n present on admission    CRITICAL CARE:  [ ]Shock Present  [ ]Septic [ ]Cardiogenic [ ]Neurologic [ ]Hypovolemic  [ ]Vasopressors [ ]Inotropes  [ ]Respiratory failure present [ ]Mechanical Ventilation [ ]Non-invasive ventilatory support [ ]High-Flow Mode: CPAP with PS, FiO2: 30, PEEP: 5, PS: 5, MAP: 7, PIP: 11  [ ]Acute  [ ]Chronic [ ]Hypoxic  [ ]Hypercarbic [ ]Other  [ ]Other organ failure     LABS:                        9.1    11.93 )-----------( 528      ( 17 Nov 2020 05:23 )             30.1   11-17    147<H>  |  113<H>  |  48<H>  ----------------------------<  186<H>  4.4   |  24  |  0.50    Ca    9.1      17 Nov 2020 05:23  Phos  3.1     11-17  Mg     2.6     11-17    PT/INR - ( 17 Nov 2020 05:23 )   PT: 14.5 sec;   INR: 1.22 ratio         PTT - ( 17 Nov 2020 05:23 )  PTT:30.7 sec      RADIOLOGY & ADDITIONAL STUDIES:    Protein Calorie Malnutrition Present: [ ]mild [ ]moderate [ ]severe [ ]underweight [ ]morbid obesity  https://www.andeal.org/vault/2440/web/files/ONC/Table_Clinical%20Characteristics%20to%20Document%20Malnutrition-White%20JV%20et%20al%202012.pdf    Height (cm): 162.5 (11-04-20 @ 07:00)  Weight (kg): 75.2 (11-03-20 @ 21:45)  BMI (kg/m2): 28.5 (11-04-20 @ 07:00)    [ ]PPSV2 < or = 30%  [ ]significant weight loss [ ]poor nutritional intake [ ]anasarca   Albumin, Serum: 3.7 g/dL (11-03-20 @ 21:20)   [ ]Artificial Nutrition    REFERRALS:   [ ]Chaplaincy  [ ]Hospice  [ ]Child Life  [ ]Social Work  [ ]Case management [ ]Holistic Therapy     Goals of Care Document:  ABDIRAHMAN May (11-04-20 @ 15:06)  Goals of Care Conversation:   Participants:  · Participants  Family  · Child(ho)  TerenceLopez Estrada    Conversation Discussion:  · Conversation  Diagnosis; MOLST Discussed    What Matters Most To Patient and Family:  · What matters most to patient and family  as per daughter and son, both thinks that patient should not suffer    Personal Advance Directives Treatment Guidelines:   Treatment Guidelines:  · Treatment Guidelines  DNR Order    MOLST:  · Completed  04-Nov-2020  · Updated  04-Nov-2020      Electronic Signatures:  Emily Newman)   (Signed 04-Nov-2020 15:08)  	Authored: Goals of Care Conversation, Personal Advance Directives Treatment Guidelines  Altaf Urena)   (Signed 04-Nov-2020 15:17)  	Co-Signer: Goals of Care Conversation, Personal Advance Directives Treatment Guidelines    Last Updated: 04-Nov-2020 15:17 by Altaf Urena)       SUBJECTIVE AND OBJECTIVE: Patient seen and examined, tachypneic on vent. not following commands.   Outreach made to son, Terence, states plan for compassionate extubation Wednesday 11/18 in the evening. He stats there is a "reason" they would like to extubate in the evening rather than during the day, therefore we discussed not transferring to PCU at this time. Emotional support provided and assurance of availability of our team to help manage any symptoms- secretions, anxiety, dyspnea. SICU team updated. SW updated.    INTERVAL HPI/OVERNIGHT EVENTS: no acute overnight events.     DNR on chart: Yes      Allergies    penicillin (Unknown)    Intolerances    MEDICATIONS  (STANDING):  amLODIPine   Tablet 10 milliGRAM(s) Oral daily  atorvastatin 40 milliGRAM(s) Oral at bedtime  chlorhexidine 0.12% Liquid 15 milliLiter(s) Oral Mucosa every 12 hours  chlorhexidine 2% Cloths 1 Application(s) Topical <User Schedule>  enoxaparin Injectable 40 milliGRAM(s) SubCutaneous daily  levETIRAcetam  Solution 500 milliGRAM(s) Enteral Tube two times a day  metoprolol tartrate 50 milliGRAM(s) Oral every 12 hours  nystatin Powder 1 Application(s) Topical two times a day  pantoprazole  Injectable 40 milliGRAM(s) IV Push daily  polyethylene glycol 3350 17 Gram(s) Oral daily  senna Syrup 10 milliLiter(s) Oral at bedtime    MEDICATIONS  (PRN):  acetaminophen    Suspension .. 975 milliGRAM(s) Oral every 6 hours PRN Mild Pain (1 - 3)      ITEMS UNCHECKED ARE NOT PRESENT    PRESENT SYMPTOMS: [x ]Unable to obtain due to poor mentation   Source if other than patient:  [ ]Family   [ ]Team     Pain:  [ ]yes [ ]no  QOL impact -   Location -                    Aggravating factors -  Quality -  Radiation -  Timing-  Severity (0-10 scale):  Minimal acceptable level (0-10 scale):     Dyspnea:                           [ ]Mild [ ]Moderate [ ]Severe  Anxiety:                             [ ]Mild [ ]Moderate [ ]Severe  Fatigue:                             [ ]Mild [ ]Moderate [ ]Severe  Nausea:                             [ ]Mild [ ]Moderate [ ]Severe  Loss of appetite:              [ ]Mild [ ]Moderate [ ]Severe  Constipation:                    [ ]Mild [ ]Moderate [ ]Severe    CPOT:    https://www.[facility].org/getattachment/[email or web address]/Critical-Care-Pain-Observation-Tool-(CPOT)    PAIN AD Score:	0  http://geriatrictoolkit.[facility]/cog/painad.pdf (Ctrl + left click to view)    Other Symptoms:  [ ]All other review of systems negative     Palliative Performance Status Version 2:       10  %      http://[facility].org/files/news/palliative_performance_scale_ppsv2.pdf  PHYSICAL EXAM:  Vital Signs Last 24 Hrs  T(C): 37 (17 Nov 2020 07:00), Max: 37.2 (16 Nov 2020 15:00)  T(F): 98.6 (17 Nov 2020 07:00), Max: 99 (16 Nov 2020 15:00)  HR: 96 (17 Nov 2020 10:00) (77 - 116)  BP: 142/69 (17 Nov 2020 10:00) (114/62 - 168/79)  BP(mean): 99 (17 Nov 2020 10:00) (84 - 114)  RR: 28 (17 Nov 2020 10:00) (20 - 28)  SpO2: 98% (17 Nov 2020 10:00) (97% - 100%) I&O's Summary    16 Nov 2020 07:01  -  17 Nov 2020 07:00  --------------------------------------------------------  IN: 1480 mL / OUT: 1000 mL / NET: 480 mL    17 Nov 2020 07:01  -  17 Nov 2020 10:54  --------------------------------------------------------  IN: 55 mL / OUT: 0 mL / NET: 55 mL       GENERAL:  [ ]Alert  [ ]Oriented x   [ ]Lethargic  [ ]Cachexia  [x ]Unarousable  [ ]Verbal  [ ]Non-Verbal  Behavioral:   [ ]Anxiety  [ ]Delirium [ ]Agitation [ ]Other  HEENT: neck collar  [ ]Normal   [ ]Dry mouth   [x ]ET Tube/Trach  [ ]Oral lesions  PULMONARY:   [ x]Clear [x ]Tachypnea  [ ]Audible excessive secretions   [ ]Rhonchi        [ ]Right [ ]Left [ ]Bilateral  [ ]Crackles        [ ]Right [ ]Left [ ]Bilateral  [ ]Wheezing     [ ]Right [ ]Left [ ]Bilateral  [ ]Diminished BS [ ] Right [ ]Left [ ]Bilateral  CARDIOVASCULAR:    [x ]Regular [ ]Irregular [ ]Tachy  [ ]Micah [ ]Murmur [ ]Other  GASTROINTESTINAL:  [x ]Soft  [ ]Distended   [x ]+BS  [ ]Non tender [ ]Tender  [ ]PEG [ ]OGT/ NGT   Last BM: per flowsheet  GENITOURINARY:  [ ]Normal [x ]Incontinent   [ ]Oliguria/Anuria   [ x][place]arr[place]co  MUSCULOSKELETAL:   [ ]Normal   [ ]Weakness  [ x]Bed/Wheelchair bound [ ]Edema  NEUROLOGIC: not following commands  [ ]No focal deficits  [ ] Cognitive impairment  [ ] Dysphagia [ ]Dysarthria [ ] Paresis [ ]Other   SKIN: ecchymoses  [ ]Normal  [ ]Rash   [ ]Pressure ulcer(s) [ ]y [ ]n present on admission    CRITICAL CARE:  [ ]Shock Present  [ ]Septic [ ]Cardiogenic [ ]Neurologic [ ]Hypovolemic  [ ]Vasopressors [ ]Inotropes  [x ]Respiratory failure present [ x]Mechanical Ventilation [ ]Non-invasive ventilatory support [ ]High-Flow Mode: CPAP with PS, FiO2: 30, PEEP: 5, PS: 5, MAP: 7, PIP: 11  [x ]Acute  [ ]Chronic [x ]Hypoxic  [ ]Hypercarbic [ ]Other  [ ]Other organ failure     LABS:                        9.1    11.93 )-----------( 528      ( 17 Nov 2020 05:23 )             30.1   11-17    147<H>  |  113<H>  |  48<H>  ----------------------------<  186<H>  4.4   |  24  |  0.50    Ca    9.1      17 Nov 2020 05:23  Phos  3.1     11-17  Mg     2.6     11-17    PT/INR - ( 17 Nov 2020 05:23 )   PT: 14.5 sec;   INR: 1.22 ratio         PTT - ( 17 Nov 2020 05:23 )  PTT:30.7 sec      RADIOLOGY & ADDITIONAL STUDIES:   < from: CT Head No Cont (11.09.20 @ 09:10) >    EXAM:  CT BRAIN                            PROCEDURE DATE:  11/09/2020            INTERPRETATION:  Noncontrast CT of the brain.    CLINICAL INDICATION:  L SDH, follow-up    TECHNIQUE : Axial CT scanning of the brain was obtained from the skull baseto the vertex without the administration of intravenous contrast. Sagittal and coronal reformats were provided.    COMPARISON: CT brain 11/4/2020    FINDINGS:    Redemonstration of right parietal approach ventriculostomy catheter in unchanged position.    Similar left lateral convexity acute subdural collection, currently measuring 1.8 cm in greatest depth, previously measuring 1.9 cm.    Similar hemorrhage in the interhemispheric fissure measuring up to 0.7 cm in greatest depth. Similar thin subdural hemorrhage layering along the left tentorium.    Similar right temporal subdural hemorrhage measuring 0.5 cm in greatest depth.    Increased small hemorrhage layering in the occipital horns.    Similar rightward midline shift of 0.9 cm. No effacement of basal cisterns. Ventricles increased in size and mildly dilated.    No parenchymal hemorrhage or brain edema.    IMPRESSION:    Ventricles increased in size and mildly dilated. Increased small hemorrhage layering in the occipital horns.  Similar rightward midline shift of 0.9 cm. No effacement of basal cisterns.  Similar multifocal subdural hemorrhage.  No parenchymal hemorrhage or brain edema.    Dr. Dinh discussed these findings with Dr. Aguilar on 11/9/2020 9:17 AM with read back.    TABBY DINH MD; Attending Radiologist  This document has been electronically signed. Nov 9 2020  9:20AM    < end of copied text >      Protein Calorie Malnutrition Present: [ ]mild [x ]moderate [ ]severe [ ]underweight [ ]morbid obesity  https://www.andeal.org/vault/2440/web/files/ONC/Table_Clinical%20Characteristics%20to%20Document%20Malnutrition-White%20JV%20et%20al%202012.pdf    Height (cm): 162.5 (11-04-20 @ 07:00)  Weight (kg): 75.2 (11-03-20 @ 21:45)  BMI (kg/m2): 28.5 (11-04-20 @ 07:00)    [ x]PPSV2 < or = 30%  [ ]significant weight loss [x ]poor nutritional intake [ ]anasarca   Albumin, Serum: 3.7 g/dL (11-03-20 @ 21:20)   [ x]Artificial Nutrition    REFERRALS:   [ ]Chaplaincy  [ ]Hospice  [ ]Child Life  [ x]Social Work  [ ]Case management [ ]Holistic Therapy     Goals of Care Document:  ABDIRAHMAN May (11-04-20 @ 15:06)  Goals of Care Conversation:   Participants:  · Participants  Family  · Child(ho)  Terence Radha Estrada    Conversation Discussion:  · Conversation  Diagnosis; MOLST Discussed    What Matters Most To Patient and Family:  · What matters most to patient and family  as per daughter and son, both thinks that patient should not suffer    Personal Advance Directives Treatment Guidelines:   Treatment Guidelines:  · Treatment Guidelines  DNR Order    MOLST:  · Completed  04-Nov-2020  · Updated  04-Nov-2020      Electronic Signatures:  Emily Newman)   (Signed 04-Nov-2020 15:08)  	Authored: Goals of Care Conversation, Personal Advance Directives Treatment Guidelines  Altaf Urena)   (Signed 04-Nov-2020 15:17)  	Co-Signer: Goals of Care Conversation, Personal Advance Directives Treatment Guidelines    Last Updated: 04-Nov-2020 15:17 by Altaf Urena)

## 2020-11-17 NOTE — PROGRESS NOTE ADULT - ATTENDING COMMENTS
I spoke with his son yesterday on the phone at length. he explained to me that they do not consider trach and PEG as options and plan to have terminal extubation on Wednesday which is 14 days from intubation. he said he understands she may not be able to breath on her own and is she doesn't he understands she may die.   he asked me to ask Dr Johnson to be present on Wednesday for palliative extubation  remains in collar since unable to examine accurately  neuro exam this AM: haphazard eye movements, pupil nl reactive, withdraws to pain in LUE and lowers but not RUE    - cont on PSV  - cont tube feeding  - palliative care reconsulted  - should palliative extubation not result in her passing, she should be transferred to palliative unit and family is happy with this plan

## 2020-11-17 NOTE — PROGRESS NOTE ADULT - SUBJECTIVE AND OBJECTIVE BOX
HISTORY  80F hx of dementia, hydrocephalus s/p  shunt and recent hip fracture non-operative management at SNF presents from SNF after a mechanical fall, level I trauma activated, primary survey significant for GCS 8, decision was made to intubate in trauma bay. Secondary survey significant for L forehead hematoma, R lower abdomen abrasion. CT scan significant for large L SDH with midline shift and L 8-10 rib fx. NSGY and family discussed, plan for non-operative management at this time. CT head 4/11 revealed decreased subdural hematoma and midline shift 11mm-> 7 mm. New low density cerebellar focus suspicious for infarcts.   Ongoing GOC discussion, no neurosurgery planned at this time.    24 HOUR EVENTS:  - Family planning for palliative extubation Wednesday night.  - Palliative care team reconsulted, to see patient and speak with family today.    SUBJECTIVE/ROS:  [ ] A ten-point review of systems was otherwise negative except as noted.  [x] Due to altered mental status/intubation, subjective information were not able to be obtained from the patient. History was obtained, to the extent possible, from review of the chart and collateral sources of information.      NEURO  Exam: Unchanged from previous. Opens eyes spontaneously, responsive pupils b/l, positive gag, moves LLE spontanous, withdraws all four extremities to pain  Meds: acetaminophen    Suspension .. 975 milliGRAM(s) Oral every 6 hours PRN Mild Pain (1 - 3)  levETIRAcetam  Solution 500 milliGRAM(s) Enteral Tube two times a day    [x] Adequacy of sedation and pain control has been assessed and adjusted      RESPIRATORY  RR: 25 (11-17-20 @ 01:00) (20 - 28)  SpO2: 100% (11-17-20 @ 01:00) (97% - 100%)  Wt(kg): --  Exam: unlabored, clear to auscultation bilaterally  Mechanical Ventilation: Mode: CPAP with PS, RR (patient): 23, FiO2: 30, PEEP: 5, PS: 5, MAP: 7, PIP: 11  ABG - ( 16 Nov 2020 06:40 )  pH: 7.53  /  pCO2: 31    /  pO2: 126   / HCO3: 26    / Base Excess: 3.5   /  SaO2: 99      Lactate: x          CARDIOVASCULAR  HR: 109 (11-17-20 @ 01:00) (74 - 109)  BP: 153/74 (11-17-20 @ 01:00) (114/62 - 168/79)  BP(mean): 104 (11-17-20 @ 01:00) (84 - 114)      Exam:  Cardiac Rhythm:  Perfusion     [x ]Adequate   [ ]Inadequate  Mentation   [ x]Normal       [ ]Reduced  Extremities  [ x]Warm         [ ]Cool  Volume Status [ ]Hypervolemic [x ]Euvolemic [ ]Hypovolemic  Meds: amLODIPine   Tablet 10 milliGRAM(s) Oral daily  metoprolol tartrate 50 milliGRAM(s) Oral every 12 hours        GI/NUTRITION  Diet: NPO w/ TF Pivot @ goal  Meds: pantoprazole  Injectable 40 milliGRAM(s) IV Push daily  polyethylene glycol 3350 17 Gram(s) Oral daily  senna Syrup 10 milliLiter(s) Oral at bedtime      GENITOURINARY  I&O's Detail    11-15 @ 07:01  -  11-16 @ 07:00  --------------------------------------------------------  IN:    Enteral Tube Flush: 340 mL    Pivot 1.5: 1320 mL  Total IN: 1660 mL    OUT:    Incontinent per Collection Bag (mL): 1200 mL  Total OUT: 1200 mL    Total NET: 460 mL      11-16 @ 07:01  -  11-17 @ 01:15  --------------------------------------------------------  IN:    Enteral Tube Flush: 60 mL    Pivot 1.5: 990 mL  Total IN: 1050 mL    OUT:    Incontinent per Collection Bag (mL): 500 mL  Total OUT: 500 mL    Total NET: 550 mL          11-16    145  |  110<H>  |  43<H>  ----------------------------<  165<H>  4.4   |  24  |  0.46<L>    Ca    9.2      16 Nov 2020 00:41  Phos  3.0     11-16  Mg     2.6     11-16      [x ] Carrasco catheter    HEMATOLOGIC  Meds: enoxaparin Injectable 40 milliGRAM(s) SubCutaneous daily    [x] VTE Prophylaxis                        9.2    11.74 )-----------( 543      ( 16 Nov 2020 00:41 )             30.0     PT/INR - ( 16 Nov 2020 00:41 )   PT: 14.3 sec;   INR: 1.20 ratio         PTT - ( 16 Nov 2020 00:41 )  PTT:31.4 sec  Transfusion     [ ] PRBC   [ ] Platelets   [ ] FFP   [ ] Cryoprecipitate      INFECTIOUS DISEASES  T(C): 36.4 (11-16-20 @ 23:00), Max: 37.3 (11-16-20 @ 07:00)  Wt(kg): --      ENDOCRINE  Capillary Blood Glucose    Meds: atorvastatin 40 milliGRAM(s) Oral at bedtime        ACCESS DEVICES:  [ x] Peripheral IV  [ ] Central Venous Line	[ ] R	[ ] L	[ ] IJ	[ ] Fem	[ ] SC	Placed:   [ ] Arterial Line		[ ] R	[ ] L	[ ] Fem	[ ] Rad	[ ] Ax	Placed:   [ ] PICC:					[ ] Mediport  [ ] Urinary Catheter, Date Placed:   [ ] Necessity of urinary, arterial, and venous catheters discussed    OTHER MEDICATIONS:  chlorhexidine 0.12% Liquid 15 milliLiter(s) Oral Mucosa every 12 hours  chlorhexidine 2% Cloths 1 Application(s) Topical <User Schedule>  nystatin Powder 1 Application(s) Topical two times a day      CODE STATUS: Yes

## 2020-11-17 NOTE — PROGRESS NOTE ADULT - ASSESSMENT
81 y/o  woman with dementia, hydrocephalus s/p VPS admitted after fall at nursing facility with LOC and unresponsiveness.  Initial CTH with acute SDH L>R and on interhemispheric fissure and tentorial leaves with rightward shift 11mm decreased to 7mm. Repeat CTH with new R inferior cerebellar hypodensity concerning for infarct. Neuro exam without appreciable interval change: intubated, C-collar, no verbal output, not following commands, movement of extremities L>R w/ noxious stimuli. EEG with L slowing and R periodic discharges but no seizures, LDL 84, HbA1C 5.6, LE doppler neg for dvt. no significant change in exam. has some LUE movement    Recommendations:  - SBP goal <160; SBP goal 100-160 given SDH  - c/w Keppra 500mg BID indefinitely for now given EEG results  - Hold antiplatelets given SDH; consider when cleared from nsx standpoint.  - Lipitor 40mg  - Telemetry monitoring  - SBP goal normotensive, less than 160/90.   - MRI/MRA when able if in agreement with GOC of family unlikely to .   - PT/OT/SS/SLP, OOBC when able  - check FS, glucose control <180  - GI/DVT ppx  - Counseling on diet, exercise, and medication adherence was done  - Counseling on smoking cessation and alcohol consumption offered when appropriate.  - Pain assessed and judicious use of narcotics when appropriate was discussed.    - Stroke education given when appropriate.  - Importance of fall prevention discussed.   - Differential diagnosis and plan of care discussed with patient and/or family and primary team  - Thank you for allowing me to participate in the care of this patient. Call with questions.   - will f/u PRN or as questions arise  - poor prognosis, family decided against trach/peg, planning for terminal extubation wednesday.    Ricki Mcmanus MD  Vascular Neurology  Office: [phone]

## 2020-11-17 NOTE — PROGRESS NOTE ADULT - PROBLEM SELECTOR PLAN 3
discussed with son Terence via phone, he wants to compassionately extubate tomorrow late evening. Discussed with SICU Attending, will continue care in SICU for now, post extubation will discuss potential for transfer pending clinical stability and hemodynamic stability. DNR in place.

## 2020-11-17 NOTE — PROGRESS NOTE ADULT - ASSESSMENT
80F hx of dementia, hydrocephalus s/p  shunt and recent hip fracture non-operative management at SNF presents from SNF after a mechanical fall, level I trauma activated, primary survey significant for GCS 8, decision was made to intubate in trauma bay. Secondary survey significant for L forehead hematoma, R lower abdomen abrasion. CT scan significant for large L SDH with midline shift and L 8-10 rib fx. NSGY and family discussed, plan for non-operative management at this time. CT head 4/11 revealed decreased subdural hematoma and midline shift 11mm-> 7 mm. New low density cerebellar focus suspicious for infarcts.   Ongoing GOC discussion, no neurosurgery planned at this time.    PLAN:  Neuro: s/p  shunt ligation at level of clavicle  - q4h neurochecks  - CT head 11/9 increased size of ventricles and hemorrhage layering in occipital horns, similar midline shift and subdural hemorrhage  - No current plans for neurosurgical intervention  - EEG with focal area of high seizure risk, keppra 500mg BID for seizure prophylaxis  - Off all sedation; PO tylenol, lidoderm  - f/u discussions with family and neurosurgery team    Resp: L 8-10 rib fx  - Intubated, Tolerates CPAP 5 FiO2 30% with PRN PRVC 400/12/5/30%  - pt would not want trach as per family  - Daily CXR    CV: Goal SBP <200  - Home atorvastatin  - Metoprolol 50mg Q12  - Amlodipine 10mg daily  - Labetalol 10mg IV Q4 PRN    GI: no acute issues  - Tube feeds pivot @ 55  - Liver mass (+ renal mass) finding discussed with son   - Discussion of PEG pending GOC   - Protonix for stress ulcer prophylaxis  - miralax/senna/lactulose    : page removed 11/9, passed TOV  - Monitor I/Os, UOP    Heme: acute anemia  - Lovenox VTE ppx started 11/6  - BLE duplex negative for DVT 11/6    ID: UTI s/p CTX course  - Monitor clinically for signs of active infection    Endo: no acute issues  - Monitor glucose on BMP    Dispo:  - SICU  - DNR. Ongoing GOC discussion. Possible palliative extubation Wednesday night. Will reconsult Palliative to see if pt eligible for PCU bed.

## 2020-11-17 NOTE — PROGRESS NOTE ADULT - SUBJECTIVE AND OBJECTIVE BOX
Neurology Progress Note    S: Patient seen and examined. No new events overnight.  family deferred trach/peg, planning for palliative extubation    Medication:  acetaminophen    Suspension .. 975 milliGRAM(s) Oral every 6 hours PRN  amLODIPine   Tablet 10 milliGRAM(s) Oral daily  atorvastatin 40 milliGRAM(s) Oral at bedtime  chlorhexidine 0.12% Liquid 15 milliLiter(s) Oral Mucosa every 12 hours  chlorhexidine 2% Cloths 1 Application(s) Topical <User Schedule>  enoxaparin Injectable 40 milliGRAM(s) SubCutaneous daily  levETIRAcetam  Solution 500 milliGRAM(s) Enteral Tube two times a day  metoprolol tartrate 50 milliGRAM(s) Oral every 12 hours  nystatin Powder 1 Application(s) Topical two times a day  pantoprazole  Injectable 40 milliGRAM(s) IV Push daily  polyethylene glycol 3350 17 Gram(s) Oral two times a day  senna Syrup 10 milliLiter(s) Oral at bedtime      Vitals:  Vital Signs Last 24 Hrs  T(C): 37.3 (16 Nov 2020 07:00), Max: 37.3 (16 Nov 2020 07:00)  T(F): 99.1 (16 Nov 2020 07:00), Max: 99.1 (16 Nov 2020 07:00)  HR: 81 (16 Nov 2020 09:00) (74 - 102)  BP: 125/59 (16 Nov 2020 09:00) (123/61 - 169/77)  BP(mean): 85 (16 Nov 2020 09:00) (84 - 111)  RR: 23 (16 Nov 2020 09:00) (18 - 26)  SpO2: 100% (16 Nov 2020 09:00) (97% - 100%)    General Exam:   General Appearance: Appropriately dressed on vent in icu   Head: Normocephalic, atraumatic and no dysmorphic features  Ear, Nose, and Throat: Moist mucous membranes, c collar  CVS: S1S2+  Resp: intubated   Extremeties: No edema or cyanosis  Skin: No bruises or rashes     Neurological (>12):  MS: Intubated, not following commands, no response to vocal stimulation. Grimaces to sternal rub.   Language: Intubated.   CNs: (R = 2mm, L = 2mm) Poorly reactive, right eye exotropia.  No noted blink to threat. Corneal reflex intact. No facial asymmetry b/l.   Motor: Increased tone in the LLE in extension. no tremors at this time.  moves LUE AG only  Sensory: Withdrawal to noxious stimuli in all 4 extremities LLE>RLE. Reduced withdrawal in the LLE.   Coordination: unable to test  Gait: Deferred     I personally reviewed the below data/images/labs:      CBC Full  -  ( 16 Nov 2020 00:41 )  WBC Count : 11.74 K/uL  RBC Count : 3.40 M/uL  Hemoglobin : 9.2 g/dL  Hematocrit : 30.0 %  Platelet Count - Automated : 543 K/uL  Mean Cell Volume : 88.2 fl  Mean Cell Hemoglobin : 27.1 pg  Mean Cell Hemoglobin Concentration : 30.7 gm/dL  Auto Neutrophil # : x  Auto Lymphocyte # : x  Auto Monocyte # : x  Auto Eosinophil # : x  Auto Basophil # : x  Auto Neutrophil % : x  Auto Lymphocyte % : x  Auto Monocyte % : x  Auto Eosinophil % : x  Auto Basophil % : x    11-16    145  |  110<H>  |  43<H>  ----------------------------<  165<H>  4.4   |  24  |  0.46<L>    Ca    9.2      16 Nov 2020 00:41  Phos  3.0     11-16  Mg     2.6     11-16        PT/INR - ( 16 Nov 2020 00:41 )   PT: 14.3 sec;   INR: 1.20 ratio         PTT - ( 16 Nov 2020 00:41 )  PTT:31.4 sec          < from: TTE with Doppler (w/Cont) (11.07.20 @ 10:30) >  OBSERVATIONS:  Mitral Valve: Mild mitral annular calcification. Mild  mitral regurgitation.  Aortic Root: The aortic root was not well seen.  Aortic Valve: The aortic valve is not visualized in short  axis imaging for anatomic evaluation.  There is no aortic stenosis. Peak transaortic valve  gradient equals 10 mm Hg, mean transaortic valve gradient  equals 5 mm Hg, aortic valve velocity time integral equals  30 cm. Mild aortic regurgitation.  Peak left ventricular  outflow tract gradient equals 3 mm Hg, mean gradient is  equal to 1 mm Hg, LVOT velocity time integral equals 17 cm.  Left Atrium: Mildly dilated left atrium.  LA volume index =  41 cc/m2.  Left Ventricle: Normal left ventricular systolic function.  No segmental wall motion abnormalities.  The LVEF= 55-60%.  Normal left ventricular size. Diastolic dysfunction with  elevated left atrial pressures.  Right Heart: Normal right atrial size.  The right atrial area= 14cm2, the right atrial volume=  35ml. Normal right ventricular size and systolic function.  Normal tricuspid valve. Minimal tricuspid regurgitation.  Pulmonic valve not well visualized.  Pericardium/PleuraThere is no pericardial effusion.  Hemodynamic: The estimated right atrial pressure is normal.  There is no shunt at the atrial level with agitated saline.  ------------------------------------------------------------------------  CONCLUSIONS:  1. There is no shunt at the atrial level with agitated  saline.  2. Diastolic dysfunction with elevated left atrial  pressures.  *** Limited echo windows, patient is intubated and supine.   Poor parasternal views.  No previous Echo exam.  ------------------------------------------------------------------------  PROCEDURE DESCRIPTION: Transthoracic echocardiogram with  2-D, M-Mode and complete spectral and color flow Doppler.  Patient was injected with 10 cc's of aerosolized saline.  Patient was injected with 10 cc's of aerosolized saline.  Verbal consent was obtained for injection of  Ultrasonic  Enhancing Agent following a discussion of risks and  benefits. Following intravenous injection of Ultrasonic  Enhancing Agent , harmonic imaging was performed.  ------------------------------------------------------------------------  ECHOCARDIOGRAPHIC EXAMINATION:  LEFT ATRIUM:  LA Volume Index: 41.00 cc/sqm  LA Volume: 74.6 cc  HR and BP:  HR: 90 bpm  BP: 191/86  BSA: 1.80  ------------------------------------------------------------------------  HEMODYNAMICS:  RA Pressure Estimate: 8  ------------------------------------------------------------------------  COLOR FLOW and SPECIAL DOPPLER:  LVOT:  LVOT Velocity: .8 M/sec  LVOT Peak Gradient Rest: 2 mm Hg  LVOT Mean Gradient Rest: 1mm Hg  ------------------------------------------------------------------------  DIASTOLIC FUNCTION:  DT:470 ms  E/A: 0.71  e' Septal: 5.0 cm/s  E/e' Septal: 14.2  e' Lateral: 4.0 cm/s  E/e' Lateral: 17.7  MV E wave: 0.7 m/s  MV A wave: 1.0 m/s  Septal a': 12.0 cm/s  Lateral a': 15.0 cm/s    < end of copied text >        EEG Classification / Summary:  Abnormal EEG study  Moderate background slowing, amplitudes reduced over the left.  Frequent runs of 1hz Left central max periodic epileptiform discharges were present.    -----------------------------------------------------------------------------------------------------    Clinical Impression:  High risk of focal onset seizures from the left paracentral region. Seizure prophylaxis recommended.  Moderate multifocal cerebral dysfunction, with fluid attenuation effect on the left.   Radiology (XR, CT, MR, U/S, TTE/JUNAID):    < from: CT Head No Cont (11.04.20 @ 13:13) >  FINDINGS:    Redemonstration of right parietal approach ventriculostomy catheter in unchanged position.    Acute left lateral convexity subdural hemorrhage measures 1.7 cm in greatest depth, decreased from 1.9 cm.    Acute right lateral convexity subdural hemorrhage measures 6 mm in greatest depth, similar to the prior examination.    Acute subdural hemorrhage in the interhemispheric fissure measures 9 mm in greatest thickness, previously measuring 11 mm    Acute subdural hemorrhage along the tentorial leaves is similar, measuring4 to 5 mm in greatest thickness on the left.    Rightward midline shift of 7 mm is decreased from 11 mm. Increased ventricular size, no large hydrocephalus. Basal cisterns are more well visualized on the current examination.    Similar nonspecific 6 mm focus of increased attenuation in the left corona radiata (2-22).    New foci of low density in the right inferior cerebellar hemisphere, suspicious for acute infarction. No CT evidence for hemorrhagic transformation.    Similar extensive white matter microvascular ischemic disease.    No displaced calvarial fracture. The visualized paranasal sinuses and mastoid air cells are clear.    IMPRESSION:  New foci of low density in the right inferior cerebellar hemisphere, suspicious for acute infarction. No CT evidence for hemorrhagic transformation.    Decreased size of left lateral convexity subdural hemorrhage. Similar size of right lateral convexity subdural hemorrhage.    Decreased size of interhemispheric subdural hemorrhage. Similar tentorial subdural hemorrhage.    Decreased rightward midline shift, currently 7 mm, previously 11 mm.    Increased ventricular size, no large hydrocephalus. Basal cisterns are more well visualized on the current examination.    < end of copied text >    < from: CT Head No Cont (11.03.20 @ 23:29) >  Bilateral supratentorial subdural hematomas are again identified. The subdural hematoma on the left side measures approximately 2.2 cm in widest diameter and on the right side measures approximately 0.5 cm in widest diameter. Acute subdural hematoma along the interhemispheric region is seen along the left side and measures approximately 0.9 cm widest diameter. Acute subdural hematomas along bilateral tentorial region are again seen as well. There is mass effect on left lateral ventricle. Left-to-right shift (1.1 cm) is again seen.    Right parietal shunt catheter is again seen. This catheter appears unchanged in position.    Evaluation of the osseous structures with the appropriate window appears normal    The visualized paranasal sinuses mastoid and middle ear regions appear clear.    IMPRESSION: No stiffing change when allowing for differences in technique.    < end of copied text >    EEG Classification / Summary:  Abnormal EEG study  Moderate background slowing, amplitudes reduced over the left.  Intermittent 0.5 to 1hz right periodic discharges, higher amplitudes over the right    -----------------------------------------------------------------------------------------------------    Clinical Impression:  Risk of focal onset seizures from the frontal regions.   Left hemispheric structural or functional abnormality  Moderate multifocal cerebral dysfunction, with fluid attenuation effect on the left.       11-10-20  Clinical Impression:  Risk of focal onset seizures from the frontal regions.   Left hemispheric structural or functional abnormality  Moderate multifocal cerebral dysfunction, with fluid attenuation effect on the left.   No change vs prior day.  No seizures x 3 days

## 2020-11-18 PROCEDURE — 99232 SBSQ HOSP IP/OBS MODERATE 35: CPT | Mod: GC

## 2020-11-18 RX ORDER — ROBINUL 0.2 MG/ML
0.4 INJECTION INTRAMUSCULAR; INTRAVENOUS EVERY 6 HOURS
Refills: 0 | Status: DISCONTINUED | OUTPATIENT
Start: 2020-11-18 | End: 2020-11-26

## 2020-11-18 RX ADMIN — LEVETIRACETAM 500 MILLIGRAM(S): 250 TABLET, FILM COATED ORAL at 05:26

## 2020-11-18 RX ADMIN — ENOXAPARIN SODIUM 40 MILLIGRAM(S): 100 INJECTION SUBCUTANEOUS at 11:19

## 2020-11-18 RX ADMIN — NYSTATIN CREAM 1 APPLICATION(S): 100000 CREAM TOPICAL at 05:26

## 2020-11-18 RX ADMIN — AMLODIPINE BESYLATE 10 MILLIGRAM(S): 2.5 TABLET ORAL at 05:25

## 2020-11-18 RX ADMIN — POLYETHYLENE GLYCOL 3350 17 GRAM(S): 17 POWDER, FOR SOLUTION ORAL at 11:19

## 2020-11-18 RX ADMIN — MORPHINE SULFATE 2 MILLIGRAM(S): 50 CAPSULE, EXTENDED RELEASE ORAL at 23:05

## 2020-11-18 RX ADMIN — MORPHINE SULFATE 2 MILLIGRAM(S): 50 CAPSULE, EXTENDED RELEASE ORAL at 22:50

## 2020-11-18 RX ADMIN — ROBINUL 0.4 MILLIGRAM(S): 0.2 INJECTION INTRAMUSCULAR; INTRAVENOUS at 22:55

## 2020-11-18 RX ADMIN — PANTOPRAZOLE SODIUM 40 MILLIGRAM(S): 20 TABLET, DELAYED RELEASE ORAL at 11:19

## 2020-11-18 RX ADMIN — CHLORHEXIDINE GLUCONATE 15 MILLILITER(S): 213 SOLUTION TOPICAL at 17:32

## 2020-11-18 RX ADMIN — CHLORHEXIDINE GLUCONATE 15 MILLILITER(S): 213 SOLUTION TOPICAL at 05:26

## 2020-11-18 RX ADMIN — LEVETIRACETAM 500 MILLIGRAM(S): 250 TABLET, FILM COATED ORAL at 17:32

## 2020-11-18 RX ADMIN — Medication 50 MILLIGRAM(S): at 05:25

## 2020-11-18 RX ADMIN — CHLORHEXIDINE GLUCONATE 1 APPLICATION(S): 213 SOLUTION TOPICAL at 05:26

## 2020-11-18 RX ADMIN — Medication 50 MILLIGRAM(S): at 17:32

## 2020-11-18 RX ADMIN — NYSTATIN CREAM 1 APPLICATION(S): 100000 CREAM TOPICAL at 17:32

## 2020-11-18 NOTE — PROGRESS NOTE ADULT - ASSESSMENT
80F hx of dementia, hydrocephalus s/p  shunt and recent hip fracture non-operative management at SNF presents from SNF after a mechanical fall, level I trauma activated, primary survey significant for GCS 8, decision was made to intubate in trauma bay. Secondary survey significant for L forehead hematoma, R lower abdomen abrasion. CT scan significant for large L SDH with midline shift and L 8-10 rib fx. NSGY and family discussed, plan for non-operative management at this time. CT head 4/11 revealed decreased subdural hematoma and midline shift 11mm-> 7 mm. New low density cerebellar focus suspicious for infarcts.   Ongoing GOC discussion, no neurosurgery planned at this time.    PLAN:  Neuro: s/p  shunt ligation at level of clavicle  - q4h neurochecks  - CT head 11/9 increased size of ventricles and hemorrhage layering in occipital horns, similar midline shift and subdural hemorrhage  - No current plans for neurosurgical intervention  - EEG with focal area of high seizure risk, keppra 500mg BID for seizure prophylaxis  - Off all sedation; PO tylenol, lidoderm  - Planned extubation 11/18 evening    Resp: L 8-10 rib fx  - Intubated, Tolerates CPAP 5 FiO2 30% with PRN PRVC 400/12/5/30%  - pt would not want trach as per family  - Daily CXR    CV: Goal SBP <200  - Home atorvastatin  - Metoprolol 50mg Q12  - Amlodipine 10mg daily  - Labetalol 10mg IV Q4 PRN    GI: no acute issues  - Tube feeds suspended  - Liver mass (+ renal mass) finding discussed with son   - Discussion of PEG pending GOC   - Protonix for stress ulcer prophylaxis  - miralax/senna/lactulose    : page removed 11/9, passed TOV  - Monitor I/Os, UOP    Heme: acute anemia  - Lovenox VTE ppx started 11/6  - BLE duplex negative for DVT 11/6    ID: UTI s/p CTX course  - Monitor clinically for signs of active infection    Endo: no acute issues  - Monitor glucose on BMP    Dispo:  - SICU  - DNR. Ongoing GOC discussion. Palliative extubation Wednesday night. Will reconsult Palliative to see if pt eligible for PCU bed.

## 2020-11-18 NOTE — PROGRESS NOTE ADULT - SUBJECTIVE AND OBJECTIVE BOX
Neurology Progress Note    S: Patient seen and examined. No new events overnight.  family deferred trach/peg, planning for palliative extubation this evening    Medication:  acetaminophen    Suspension .. 975 milliGRAM(s) Oral every 6 hours PRN  amLODIPine   Tablet 10 milliGRAM(s) Oral daily  atorvastatin 40 milliGRAM(s) Oral at bedtime  chlorhexidine 0.12% Liquid 15 milliLiter(s) Oral Mucosa every 12 hours  chlorhexidine 2% Cloths 1 Application(s) Topical <User Schedule>  enoxaparin Injectable 40 milliGRAM(s) SubCutaneous daily  levETIRAcetam  Solution 500 milliGRAM(s) Enteral Tube two times a day  metoprolol tartrate 50 milliGRAM(s) Oral every 12 hours  nystatin Powder 1 Application(s) Topical two times a day  pantoprazole  Injectable 40 milliGRAM(s) IV Push daily  polyethylene glycol 3350 17 Gram(s) Oral two times a day  senna Syrup 10 milliLiter(s) Oral at bedtime      Vitals:  Vital Signs Last 24 Hrs  T(C): 37.3 (16 Nov 2020 07:00), Max: 37.3 (16 Nov 2020 07:00)  T(F): 99.1 (16 Nov 2020 07:00), Max: 99.1 (16 Nov 2020 07:00)  HR: 81 (16 Nov 2020 09:00) (74 - 102)  BP: 125/59 (16 Nov 2020 09:00) (123/61 - 169/77)  BP(mean): 85 (16 Nov 2020 09:00) (84 - 111)  RR: 23 (16 Nov 2020 09:00) (18 - 26)  SpO2: 100% (16 Nov 2020 09:00) (97% - 100%)    General Exam:   General Appearance: Appropriately dressed on vent in icu   Head: Normocephalic, atraumatic and no dysmorphic features  Ear, Nose, and Throat: Moist mucous membranes, c collar  CVS: S1S2+  Resp: intubated   Extremeties: No edema or cyanosis  Skin: No bruises or rashes     Neurological (>12):  MS: Intubated, not following commands, no response to vocal stimulation. Grimaces to sternal rub.   Language: Intubated.   CNs: (R = 2mm, L = 2mm) Poorly reactive, right eye exotropia.  No noted blink to threat. Corneal reflex intact. No facial asymmetry b/l.   Motor: Increased tone in the LLE in extension. no tremors at this time.  moves LUE AG only  Sensory: Withdrawal to noxious stimuli in all 4 extremities LLE>RLE. Reduced withdrawal in the LLE.   Coordination: unable to test  Gait: Deferred     I personally reviewed the below data/images/labs:      CBC Full  -  ( 16 Nov 2020 00:41 )  WBC Count : 11.74 K/uL  RBC Count : 3.40 M/uL  Hemoglobin : 9.2 g/dL  Hematocrit : 30.0 %  Platelet Count - Automated : 543 K/uL  Mean Cell Volume : 88.2 fl  Mean Cell Hemoglobin : 27.1 pg  Mean Cell Hemoglobin Concentration : 30.7 gm/dL  Auto Neutrophil # : x  Auto Lymphocyte # : x  Auto Monocyte # : x  Auto Eosinophil # : x  Auto Basophil # : x  Auto Neutrophil % : x  Auto Lymphocyte % : x  Auto Monocyte % : x  Auto Eosinophil % : x  Auto Basophil % : x    11-16    145  |  110<H>  |  43<H>  ----------------------------<  165<H>  4.4   |  24  |  0.46<L>    Ca    9.2      16 Nov 2020 00:41  Phos  3.0     11-16  Mg     2.6     11-16        PT/INR - ( 16 Nov 2020 00:41 )   PT: 14.3 sec;   INR: 1.20 ratio         PTT - ( 16 Nov 2020 00:41 )  PTT:31.4 sec          < from: TTE with Doppler (w/Cont) (11.07.20 @ 10:30) >  OBSERVATIONS:  Mitral Valve: Mild mitral annular calcification. Mild  mitral regurgitation.  Aortic Root: The aortic root was not well seen.  Aortic Valve: The aortic valve is not visualized in short  axis imaging for anatomic evaluation.  There is no aortic stenosis. Peak transaortic valve  gradient equals 10 mm Hg, mean transaortic valve gradient  equals 5 mm Hg, aortic valve velocity time integral equals  30 cm. Mild aortic regurgitation.  Peak left ventricular  outflow tract gradient equals 3 mm Hg, mean gradient is  equal to 1 mm Hg, LVOT velocity time integral equals 17 cm.  Left Atrium: Mildly dilated left atrium.  LA volume index =  41 cc/m2.  Left Ventricle: Normal left ventricular systolic function.  No segmental wall motion abnormalities.  The LVEF= 55-60%.  Normal left ventricular size. Diastolic dysfunction with  elevated left atrial pressures.  Right Heart: Normal right atrial size.  The right atrial area= 14cm2, the right atrial volume=  35ml. Normal right ventricular size and systolic function.  Normal tricuspid valve. Minimal tricuspid regurgitation.  Pulmonic valve not well visualized.  Pericardium/PleuraThere is no pericardial effusion.  Hemodynamic: The estimated right atrial pressure is normal.  There is no shunt at the atrial level with agitated saline.  ------------------------------------------------------------------------  CONCLUSIONS:  1. There is no shunt at the atrial level with agitated  saline.  2. Diastolic dysfunction with elevated left atrial  pressures.  *** Limited echo windows, patient is intubated and supine.   Poor parasternal views.  No previous Echo exam.  ------------------------------------------------------------------------  PROCEDURE DESCRIPTION: Transthoracic echocardiogram with  2-D, M-Mode and complete spectral and color flow Doppler.  Patient was injected with 10 cc's of aerosolized saline.  Patient was injected with 10 cc's of aerosolized saline.  Verbal consent was obtained for injection of  Ultrasonic  Enhancing Agent following a discussion of risks and  benefits. Following intravenous injection of Ultrasonic  Enhancing Agent , harmonic imaging was performed.  ------------------------------------------------------------------------  ECHOCARDIOGRAPHIC EXAMINATION:  LEFT ATRIUM:  LA Volume Index: 41.00 cc/sqm  LA Volume: 74.6 cc  HR and BP:  HR: 90 bpm  BP: 191/86  BSA: 1.80  ------------------------------------------------------------------------  HEMODYNAMICS:  RA Pressure Estimate: 8  ------------------------------------------------------------------------  COLOR FLOW and SPECIAL DOPPLER:  LVOT:  LVOT Velocity: .8 M/sec  LVOT Peak Gradient Rest: 2 mm Hg  LVOT Mean Gradient Rest: 1mm Hg  ------------------------------------------------------------------------  DIASTOLIC FUNCTION:  DT:470 ms  E/A: 0.71  e' Septal: 5.0 cm/s  E/e' Septal: 14.2  e' Lateral: 4.0 cm/s  E/e' Lateral: 17.7  MV E wave: 0.7 m/s  MV A wave: 1.0 m/s  Septal a': 12.0 cm/s  Lateral a': 15.0 cm/s    < end of copied text >        EEG Classification / Summary:  Abnormal EEG study  Moderate background slowing, amplitudes reduced over the left.  Frequent runs of 1hz Left central max periodic epileptiform discharges were present.    -----------------------------------------------------------------------------------------------------    Clinical Impression:  High risk of focal onset seizures from the left paracentral region. Seizure prophylaxis recommended.  Moderate multifocal cerebral dysfunction, with fluid attenuation effect on the left.   Radiology (XR, CT, MR, U/S, TTE/JUNAID):    < from: CT Head No Cont (11.04.20 @ 13:13) >  FINDINGS:    Redemonstration of right parietal approach ventriculostomy catheter in unchanged position.    Acute left lateral convexity subdural hemorrhage measures 1.7 cm in greatest depth, decreased from 1.9 cm.    Acute right lateral convexity subdural hemorrhage measures 6 mm in greatest depth, similar to the prior examination.    Acute subdural hemorrhage in the interhemispheric fissure measures 9 mm in greatest thickness, previously measuring 11 mm    Acute subdural hemorrhage along the tentorial leaves is similar, measuring4 to 5 mm in greatest thickness on the left.    Rightward midline shift of 7 mm is decreased from 11 mm. Increased ventricular size, no large hydrocephalus. Basal cisterns are more well visualized on the current examination.    Similar nonspecific 6 mm focus of increased attenuation in the left corona radiata (2-22).    New foci of low density in the right inferior cerebellar hemisphere, suspicious for acute infarction. No CT evidence for hemorrhagic transformation.    Similar extensive white matter microvascular ischemic disease.    No displaced calvarial fracture. The visualized paranasal sinuses and mastoid air cells are clear.    IMPRESSION:  New foci of low density in the right inferior cerebellar hemisphere, suspicious for acute infarction. No CT evidence for hemorrhagic transformation.    Decreased size of left lateral convexity subdural hemorrhage. Similar size of right lateral convexity subdural hemorrhage.    Decreased size of interhemispheric subdural hemorrhage. Similar tentorial subdural hemorrhage.    Decreased rightward midline shift, currently 7 mm, previously 11 mm.    Increased ventricular size, no large hydrocephalus. Basal cisterns are more well visualized on the current examination.    < end of copied text >    < from: CT Head No Cont (11.03.20 @ 23:29) >  Bilateral supratentorial subdural hematomas are again identified. The subdural hematoma on the left side measures approximately 2.2 cm in widest diameter and on the right side measures approximately 0.5 cm in widest diameter. Acute subdural hematoma along the interhemispheric region is seen along the left side and measures approximately 0.9 cm widest diameter. Acute subdural hematomas along bilateral tentorial region are again seen as well. There is mass effect on left lateral ventricle. Left-to-right shift (1.1 cm) is again seen.    Right parietal shunt catheter is again seen. This catheter appears unchanged in position.    Evaluation of the osseous structures with the appropriate window appears normal    The visualized paranasal sinuses mastoid and middle ear regions appear clear.    IMPRESSION: No stiffing change when allowing for differences in technique.    < end of copied text >    EEG Classification / Summary:  Abnormal EEG study  Moderate background slowing, amplitudes reduced over the left.  Intermittent 0.5 to 1hz right periodic discharges, higher amplitudes over the right    -----------------------------------------------------------------------------------------------------    Clinical Impression:  Risk of focal onset seizures from the frontal regions.   Left hemispheric structural or functional abnormality  Moderate multifocal cerebral dysfunction, with fluid attenuation effect on the left.       11-10-20  Clinical Impression:  Risk of focal onset seizures from the frontal regions.   Left hemispheric structural or functional abnormality  Moderate multifocal cerebral dysfunction, with fluid attenuation effect on the left.   No change vs prior day.  No seizures x 3 days

## 2020-11-18 NOTE — PROGRESS NOTE ADULT - SUBJECTIVE AND OBJECTIVE BOX
HISTORY  80F hx of dementia, hydrocephalus s/p  shunt and recent hip fracture non-operative management at SNF presents from SNF after a mechanical fall, level I trauma activated, primary survey significant for GCS 8, decision was made to intubate in trauma bay. Secondary survey significant for L forehead hematoma, R lower abdomen abrasion. CT scan significant for large L SDH with midline shift and L 8-10 rib fx. NSGY and family discussed, plan for non-operative management at this time. CT head 4/11 revealed decreased subdural hematoma and midline shift 11mm-> 7 mm. New low density cerebellar focus suspicious for infarcts.   Ongoing GOC discussion, no neurosurgery planned at this time.    24 HOUR EVENTS:  - Palliative extubation scheduled for Wednesday night.  - Feeding suspended     SUBJECTIVE/ROS:  [ ] A ten-point review of systems was otherwise negative except as noted.  [x] Due to altered mental status/intubation, subjective information were not able to be obtained from the patient. History was obtained, to the extent possible, from review of the chart and collateral sources of information.      NEURO  Exam: Unchanged from previous. Opens eyes spontaneously, responsive pupils b/l, positive gag, moves LLE spontanous, withdraws all four extremities to pain  Meds: acetaminophen    Suspension .. 975 milliGRAM(s) Oral every 6 hours PRN Mild Pain (1 - 3)  levETIRAcetam  Solution 500 milliGRAM(s) Enteral Tube two times a day    [x] Adequacy of sedation and pain control has been assessed and adjusted      RESPIRATORY  RR: 21 (17 Nov 2020 23:00) (20 - 29)  SpO2: 100% (17 Nov 2020 23:00) (95% - 100%)    Exam: unlabored, clear to auscultation bilaterally  Mechanical Ventilation: Mode: CPAP with PS, RR (patient): 23, FiO2: 30, PEEP: 5, PS: 5, MAP: 7, PIP: 11  Blood Gas Profile - Arterial (11.16.20 @ 06:40)   pH, Arterial: 7.53 , pCO2, Arterial: 31 mmHg, pO2, Arterial: 126 mmHg, HCO3, Arterial: 26 mmol/L, Base Excess, Arterial: 3.5 mmol/L, Oxygen Saturation, Arterial: 99 %   Total CO2, Arterial: 27 mmoL/L , FIO2, Arterial: 30   Lactate: x          CARDIOVASCULAR  HR: 93 (17 Nov 2020 23:00) (77 - 116)  BP: 133/62 (17 Nov 2020 23:00) (122/56 - 161/76)  BP(mean): 89 (17 Nov 2020 23:00) (81 - 108)    Exam:  Cardiac Rhythm:  Perfusion     [x ]Adequate   [ ]Inadequate  Mentation   [ x]Normal       [ ]Reduced  Extremities  [ x]Warm         [ ]Cool  Volume Status [ ]Hypervolemic [x ]Euvolemic [ ]Hypovolemic  Meds: amLODIPine   Tablet 10 milliGRAM(s) Oral daily  metoprolol tartrate 50 milliGRAM(s) Oral every 12 hours    GI/NUTRITION  Diet: NPO   Meds: pantoprazole  Injectable 40 milliGRAM(s) IV Push daily  polyethylene glycol 3350 17 Gram(s) Oral daily  senna Syrup 10 milliLiter(s) Oral at bedtime      GENITOURINARY  I&O's Detail      16 Nov 2020 07:01  -  17 Nov 2020 07:00  --------------------------------------------------------  IN:    Enteral Tube Flush: 160 mL    Pivot 1.5: 1320 mL  Total IN: 1480 mL    OUT:    Incontinent per Collection Bag (mL): 1000 mL  Total OUT: 1000 mL    Total NET: 480 mL      17 Nov 2020 07:01  -  18 Nov 2020 00:25  --------------------------------------------------------  IN:    Enteral Tube Flush: 60 mL    Pivot 1.5: 880 mL  Total IN: 940 mL    OUT:    Incontinent per Collection Bag (mL): 500 mL  Total OUT: 500 mL    Total NET: 440 mL      11-17    147<H>  |  113<H>  |  48<H>  ----------------------------<  186<H>  4.4   |  24  |  0.50    Ca    9.1      17 Nov 2020 05:23  Phos  3.1     11-17  Mg     2.6     11-17        [x ] Carrasco catheter    HEMATOLOGIC  Meds: enoxaparin Injectable 40 milliGRAM(s) SubCutaneous daily    [x] VTE Prophylaxis                          9.1    11.93 )-----------( 528      ( 17 Nov 2020 05:23 )             30.1     PT/INR - ( 17 Nov 2020 05:23 )   PT: 14.5 sec;   INR: 1.22 ratio         PTT - ( 17 Nov 2020 05:23 )  PTT:30.7 sec  Transfusion     [ ] PRBC   [ ] Platelets   [ ] FFP   [ ] Cryoprecipitate      INFECTIOUS DISEASES  ICU Vital Signs Last 24 Hrs  T(C): 36.9 (17 Nov 2020 23:00), Max: 37.6 (17 Nov 2020 11:00)  T(F): 98.4 (17 Nov 2020 23:00), Max: 99.7 (17 Nov 2020 11:00)    ENDOCRINE  Capillary Blood Glucose    Meds: atorvastatin 40 milliGRAM(s) Oral at bedtime        ACCESS DEVICES:  [ x] Peripheral IV  [ ] Central Venous Line	[ ] R	[ ] L	[ ] IJ	[ ] Fem	[ ] SC	Placed:   [ ] Arterial Line		[ ] R	[ ] L	[ ] Fem	[ ] Rad	[ ] Ax	Placed:   [ ] PICC:					[ ] Mediport  [ ] Urinary Catheter, Date Placed:   [ ] Necessity of urinary, arterial, and venous catheters discussed    OTHER MEDICATIONS:  chlorhexidine 0.12% Liquid 15 milliLiter(s) Oral Mucosa every 12 hours  chlorhexidine 2% Cloths 1 Application(s) Topical <User Schedule>  nystatin Powder 1 Application(s) Topical two times a day      CODE STATUS: Yes

## 2020-11-18 NOTE — PROGRESS NOTE ADULT - ASSESSMENT
81 y/o  woman with dementia, hydrocephalus s/p VPS admitted after fall at nursing facility with LOC and unresponsiveness.  Initial CTH with acute SDH L>R and on interhemispheric fissure and tentorial leaves with rightward shift 11mm decreased to 7mm. Repeat CTH with new R inferior cerebellar hypodensity concerning for infarct. Neuro exam without appreciable interval change: intubated, C-collar, no verbal output, not following commands, movement of extremities L>R w/ noxious stimuli. EEG with L slowing and R periodic discharges but no seizures, LDL 84, HbA1C 5.6, LE doppler neg for dvt. no significant change in exam. has some LUE movement    Recommendations:  - SBP goal <160; SBP goal 100-160 given SDH  - c/w Keppra 500mg BID indefinitely for now given EEG results  - Hold antiplatelets given SDH; consider when cleared from nsx standpoint.  - Lipitor 40mg  - Telemetry monitoring  - GI/DVT ppx  - Counseling on diet, exercise, and medication adherence was done  - Counseling on smoking cessation and alcohol consumption offered when appropriate.  - Pain assessed and judicious use of narcotics when appropriate was discussed.    - Stroke education given when appropriate.  - Importance of fall prevention discussed.   - Differential diagnosis and plan of care discussed with patient and/or family and primary team  - Thank you for allowing me to participate in the care of this patient. Call with questions.   - will f/u PRN or as questions arise  - poor prognosis, family decided against trach/peg, planning for terminal extubation this evening.  - will sign off    Ricki Mcmanus MD  Vascular Neurology  Office: [phone]

## 2020-11-18 NOTE — PROGRESS NOTE ADULT - ASSESSMENT
Assessment:   80 year old female, with a medical history significant for dementia, not on A/C, BIBEMS unresponsive with head injury, reportedly fell, intubated in the ED to protect airway (GS=8), imaging studies showed left acute SDH, repeat CT after  shunt ligation shows stable SDH and left to right shift. CT head 4/11 revealed decreased subdural hematoma and midline shift 11mm-> 7 mm. New low density cerebellar focus suspicious for infarcts. Pt neuro status improved, following commands and opens the eyes spontaneously.      Plan:   - Pain control   - Appreciate neurosurgery:  Conservative management.   - Follow up with family meeting discussing of GOC, palliative extubation on Wednesday    - Appreciate SICU care  - DNR    ACS  [id number]

## 2020-11-18 NOTE — PROGRESS NOTE ADULT - ATTENDING COMMENTS
no major events overnight  on PSV   plan for 9:30PM compassionate extubation. given HD stability, anticipate no immediate passing, palliative is following with patient  cont tube feeds per family  Dr Johnson has seen the patient earlier today

## 2020-11-18 NOTE — PROGRESS NOTE ADULT - SUBJECTIVE AND OBJECTIVE BOX
SUBJECTIVE:   Patient seen and examined at bedside during AM rounds. No acute events overnight.  Intubated and sedated.     OBJECTIVE: T(C): 36.5 (11-18-20 @ 11:00), Max: 36.9 (11-17-20 @ 23:00)  HR: 101 (11-18-20 @ 11:00) (77 - 109)  BP: 112/57 (11-18-20 @ 11:00) (110/53 - 158/75)  RR: 18 (11-18-20 @ 11:00) (18 - 29)  SpO2: 100% (11-18-20 @ 11:00) (95% - 100%)  Wt(kg): --  I&O's Summary    17 Nov 2020 07:01  -  18 Nov 2020 07:00  --------------------------------------------------------  IN: 1380 mL / OUT: 950 mL / NET: 430 mL      I&O's Detail    17 Nov 2020 07:01  -  18 Nov 2020 07:00  --------------------------------------------------------  IN:    Enteral Tube Flush: 60 mL    Pivot 1.5: 1320 mL  Total IN: 1380 mL    OUT:    Incontinent per Collection Bag (mL): 950 mL  Total OUT: 950 mL    Total NET: 430 mL        General: intubated and sedated, withdraws to pain  Resp: intubated, CTA B/L  Abdomen: soft, nontender, nondistended  Vasc: WWP      MEDICATIONS  (STANDING):  amLODIPine   Tablet 10 milliGRAM(s) Oral daily  atorvastatin 40 milliGRAM(s) Oral at bedtime  chlorhexidine 0.12% Liquid 15 milliLiter(s) Oral Mucosa every 12 hours  chlorhexidine 2% Cloths 1 Application(s) Topical <User Schedule>  enoxaparin Injectable 40 milliGRAM(s) SubCutaneous daily  levETIRAcetam  Solution 500 milliGRAM(s) Enteral Tube two times a day  metoprolol tartrate 50 milliGRAM(s) Oral every 12 hours  nystatin Powder 1 Application(s) Topical two times a day  pantoprazole  Injectable 40 milliGRAM(s) IV Push daily  polyethylene glycol 3350 17 Gram(s) Oral daily  senna Syrup 10 milliLiter(s) Oral at bedtime    MEDICATIONS  (PRN):  acetaminophen    Suspension .. 975 milliGRAM(s) Oral every 6 hours PRN Mild Pain (1 - 3)  LORazepam   Injectable 0.2 milliGRAM(s) IV Push every 1 hour PRN anxiety/agitation/refractory dyspnea  morphine  - Injectable 2 milliGRAM(s) IV Push every 1 hour PRN dyspnea      LABS:                        9.1    11.93 )-----------( 528      ( 17 Nov 2020 05:23 )             30.1     11-17    147<H>  |  113<H>  |  48<H>  ----------------------------<  186<H>  4.4   |  24  |  0.50    Ca    9.1      17 Nov 2020 05:23  Phos  3.1     11-17  Mg     2.6     11-17      PT/INR - ( 17 Nov 2020 05:23 )   PT: 14.5 sec;   INR: 1.22 ratio         PTT - ( 17 Nov 2020 05:23 )  PTT:30.7 sec      RADIOLOGY & ADDITIONAL STUDIES:

## 2020-11-19 DIAGNOSIS — I63.9 CEREBRAL INFARCTION, UNSPECIFIED: ICD-10-CM

## 2020-11-19 PROCEDURE — 99497 ADVNCD CARE PLAN 30 MIN: CPT

## 2020-11-19 PROCEDURE — 99233 SBSQ HOSP IP/OBS HIGH 50: CPT

## 2020-11-19 PROCEDURE — 99232 SBSQ HOSP IP/OBS MODERATE 35: CPT | Mod: GC

## 2020-11-19 RX ORDER — HYDROMORPHONE HYDROCHLORIDE 2 MG/ML
0.5 INJECTION INTRAMUSCULAR; INTRAVENOUS; SUBCUTANEOUS
Refills: 0 | Status: DISCONTINUED | OUTPATIENT
Start: 2020-11-19 | End: 2020-11-20

## 2020-11-19 RX ORDER — HYDROMORPHONE HYDROCHLORIDE 2 MG/ML
0.5 INJECTION INTRAMUSCULAR; INTRAVENOUS; SUBCUTANEOUS ONCE
Refills: 0 | Status: DISCONTINUED | OUTPATIENT
Start: 2020-11-19 | End: 2020-11-19

## 2020-11-19 RX ORDER — ACETAMINOPHEN 500 MG
650 TABLET ORAL EVERY 6 HOURS
Refills: 0 | Status: DISCONTINUED | OUTPATIENT
Start: 2020-11-19 | End: 2020-11-26

## 2020-11-19 RX ADMIN — CHLORHEXIDINE GLUCONATE 1 APPLICATION(S): 213 SOLUTION TOPICAL at 06:36

## 2020-11-19 RX ADMIN — Medication 0.2 MILLIGRAM(S): at 21:59

## 2020-11-19 RX ADMIN — ROBINUL 0.4 MILLIGRAM(S): 0.2 INJECTION INTRAMUSCULAR; INTRAVENOUS at 21:59

## 2020-11-19 RX ADMIN — MORPHINE SULFATE 2 MILLIGRAM(S): 50 CAPSULE, EXTENDED RELEASE ORAL at 22:00

## 2020-11-19 RX ADMIN — NYSTATIN CREAM 1 APPLICATION(S): 100000 CREAM TOPICAL at 06:36

## 2020-11-19 RX ADMIN — MORPHINE SULFATE 2 MILLIGRAM(S): 50 CAPSULE, EXTENDED RELEASE ORAL at 03:47

## 2020-11-19 RX ADMIN — HYDROMORPHONE HYDROCHLORIDE 0.5 MILLIGRAM(S): 2 INJECTION INTRAMUSCULAR; INTRAVENOUS; SUBCUTANEOUS at 22:32

## 2020-11-19 RX ADMIN — MORPHINE SULFATE 2 MILLIGRAM(S): 50 CAPSULE, EXTENDED RELEASE ORAL at 04:02

## 2020-11-19 RX ADMIN — NYSTATIN CREAM 1 APPLICATION(S): 100000 CREAM TOPICAL at 17:42

## 2020-11-19 NOTE — PROGRESS NOTE ADULT - ASSESSMENT
Assessment:   80 year old female, with a medical history significant for dementia, not on A/C, BIBEMS unresponsive with head injury, reportedly fell, intubated in the ED to protect airway (GS=8), imaging studies showed left acute SDH, repeat CT after  shunt ligation shows stable SDH and left to right shift. CT head 4/11 revealed decreased subdural hematoma and midline shift 11mm-> 7 mm. New low density cerebellar focus suspicious for infarcts. Pt neuro status improved, following commands and opens the eyes spontaneously.      Plan:   - Pain control   - Appreciate neurosurgery:  Conservative management.  - Appreciate SICU care  - DNR      Lv Francois, PGY-1  Acute Care Surgery  [phone]

## 2020-11-19 NOTE — PROGRESS NOTE ADULT - SUBJECTIVE AND OBJECTIVE BOX
HISTORY  80F hx of dementia, hydrocephalus s/p  shunt and recent hip fracture non-operative management at SNF presents from SNF after a mechanical fall, level I trauma activated, primary survey significant for GCS 8, decision was made to intubate in trauma bay. Secondary survey significant for L forehead hematoma, R lower abdomen abrasion. CT scan significant for large L SDH with midline shift and L 8-10 rib fx. NSGY and family discussed, plan for non-operative management at this time. CT head 4/11 revealed decreased subdural hematoma and midline shift 11mm-> 7 mm. New low density cerebellar focus suspicious for infarcts.   Ongoing GOC discussion, no neurosurgery planned at this time.    24 HOUR EVENTS:  - Palliative extubation preformed   - End of life care initiated       SUBJECTIVE/ROS:  [ ] A ten-point review of systems was otherwise negative except as noted.  [x] Due to altered mental status/intubation, subjective information were not able to be obtained from the patient. History was obtained, to the extent possible, from review of the chart and collateral sources of information.    NEURO  Exam: Unchanged from previous. Opens eyes spontaneously, responsive pupils b/l, positive gag, moves LLE spontanous, withdraws all four extremities to pain  Meds: LORazepam   Injectable 0.2 milliGRAM(s) IV Push every 1 hour PRN anxiety/agitation/refractory dyspnea  morphine  - Injectable 2 milliGRAM(s) IV Push every 1 hour PRN dyspnea  [x] Adequacy of sedation and pain control has been assessed and adjusted      RESPIRATORY  RR: 13 (19 Nov 2020 01:00) (13 - 26)  SpO2: 100% (19 Nov 2020 01:00) (99% - 100%)    Exam: unlabored, clear to auscultation bilaterally      CARDIOVASCULAR  HR: 91 (19 Nov 2020 01:00) (80 - 114)  BP: 150/67 (19 Nov 2020 01:00) (110/53 - 161/70)  BP(mean): 97 (19 Nov 2020 01:00) (76 - 107)    Exam:  Cardiac Rhythm:  Perfusion     [x ]Adequate   [ ]Inadequate  Mentation   [ x]Normal       [ ]Reduced  Extremities  [ x]Warm         [ ]Cool  Volume Status [ ]Hypervolemic [x ]Euvolemic [ ]Hypovolemic  Meds: amLODIPine   Tablet 10 milliGRAM(s) Oral daily  metoprolol tartrate 50 milliGRAM(s) Oral every 12 hours    GI/NUTRITION  Diet: NPO   Meds: pantoprazole  Injectable 40 milliGRAM(s) IV Push daily  glycopyrrolate Injectable 0.4 milliGRAM(s) IV Push every 6 hours PRN Secretions    GENITOURINARY    17 Nov 2020 07:01  -  18 Nov 2020 07:00  --------------------------------------------------------  IN:    Enteral Tube Flush: 60 mL    Pivot 1.5: 1320 mL  Total IN: 1380 mL    OUT:    Incontinent per Collection Bag (mL): 950 mL  Total OUT: 950 mL    Total NET: 430 mL      18 Nov 2020 07:01  -  19 Nov 2020 02:24  --------------------------------------------------------  IN:    Pivot 1.5: 605 mL  Total IN: 605 mL    OUT:    Incontinent per Collection Bag (mL): 600 mL  Total OUT: 600 mL    Total NET: 5 mL    11-17    147<H>  |  113<H>  |  48<H>  ----------------------------<  186<H>  4.4   |  24  |  0.50    Ca    9.1      17 Nov 2020 05:23  Phos  3.1     11-17  Mg     2.6     11-17      [x ] Carrasco catheter    HEMATOLOGIC      [x] VTE Prophylaxis                                   9.1    11.93 )-----------( 528      ( 17 Nov 2020 05:23 )             30.1     PT/INR - ( 17 Nov 2020 05:23 )   PT: 14.5 sec;   INR: 1.22 ratio         PTT - ( 17 Nov 2020 05:23 )  PTT:30.7 sec  Transfusion     [ ] PRBC   [ ] Platelets   [ ] FFP   [ ] Cryoprecipitate      INFECTIOUS DISEASES  T(C): 36.7 (18 Nov 2020 23:00), Max: 37 (18 Nov 2020 15:00)  T(F): 98.1 (18 Nov 2020 23:00), Max: 98.6 (18 Nov 2020 15:00)    ENDOCRINE  Capillary Blood Glucose    Meds: atorvastatin 40 milliGRAM(s) Oral at bedtime        ACCESS DEVICES:  [ x] Peripheral IV  [ ] Central Venous Line	[ ] R	[ ] L	[ ] IJ	[ ] Fem	[ ] SC	Placed:   [ ] Arterial Line		[ ] R	[ ] L	[ ] Fem	[ ] Rad	[ ] Ax	Placed:   [ ] PICC:					[ ] Mediport  [ ] Urinary Catheter, Date Placed:   [ ] Necessity of urinary, arterial, and venous catheters discussed    OTHER MEDICATIONS:  chlorhexidine 0.12% Liquid 15 milliLiter(s) Oral Mucosa every 12 hours  chlorhexidine 2% Cloths 1 Application(s) Topical <User Schedule>  nystatin Powder 1 Application(s) Topical two times a day      CODE STATUS: Yes

## 2020-11-19 NOTE — PROGRESS NOTE ADULT - ASSESSMENT
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 She was intubated and admitted to 8ICU, CT A/P with ?new vs. confirmed evidence metastatic renal cell carcinoma. Currently with no apparent mental status off sedation.

## 2020-11-19 NOTE — PROGRESS NOTE ADULT - SUBJECTIVE AND OBJECTIVE BOX
ACUTE CARE SURGERY PROGRESS NOTE    Subjective:   Patient seen and examined at bedside during AM rounds. Palliatively extubated. No other acute events overnight.      Objective:  Vital Signs  T(C): 36.6 (11-19 @ 11:00), Max: 37 (11-18 @ 15:00)  HR: 103 (11-19 @ 11:00) (80 - 114)  BP: 140/65 (11-19 @ 11:00) (125/58 - 169/77)  RR: 15 (11-19 @ 11:00) (12 - 25)  SpO2: 95% (11-19 @ 11:00) (95% - 100%)  11-18-20 @ 07:01  -  11-19-20 @ 07:00  --------------------------------------------------------  IN: 605 mL / OUT: 950 mL / NET: -345 mL        Physical Exam:  General: extubated  Resp: moving air  Abdomen: soft, nontender, nondistended  Vasc: WWP    Labs:        CAPILLARY BLOOD GLUCOSE          Medications:   MEDICATIONS  (STANDING):  chlorhexidine 2% Cloths 1 Application(s) Topical <User Schedule>  nystatin Powder 1 Application(s) Topical two times a day    MEDICATIONS  (PRN):  glycopyrrolate Injectable 0.4 milliGRAM(s) IV Push every 6 hours PRN Secretions  LORazepam   Injectable 0.2 milliGRAM(s) IV Push every 1 hour PRN anxiety/agitation/refractory dyspnea  morphine  - Injectable 2 milliGRAM(s) IV Push every 1 hour PRN dyspnea

## 2020-11-19 NOTE — PROGRESS NOTE ADULT - ASSESSMENT
80F hx of dementia, hydrocephalus s/p  shunt and recent hip fracture non-operative management at SNF presents from SNF after a mechanical fall, level I trauma activated, primary survey significant for GCS 8, decision was made to intubate in trauma bay. Secondary survey significant for L forehead hematoma, R lower abdomen abrasion. CT scan significant for large L SDH with midline shift and L 8-10 rib fx. NSGY and family discussed, plan for non-operative management at this time. CT head 4/11 revealed decreased subdural hematoma and midline shift 11mm-> 7 mm. New low density cerebellar focus suspicious for infarcts.   Ongoing GOC discussion, no neurosurgery planned at this time.    PLAN:  Neuro: s/p  shunt ligation at level of clavicle  - CT head 11/9 increased size of ventricles and hemorrhage layering in occipital horns, similar midline shift and subdural hemorrhage  - Palliative extubation preformed   - Off all sedation; PO Morphine, Ativan PRN      Resp: L 8-10 rib fx  - Extubated 11/18      CV: Goal SBP <200  - Home atorvastatin      GI: no acute issues  - Tube feeds suspended  - Protonix for stress ulcer prophylaxis      : page removed 11/9, passed TOV  - Monitor I/Os, UOP    Heme: acute anemia  - VTE Prophylaxis held    ID: UTI s/p CTX course  - Monitor clinically for signs of active infection    Endo: no acute issues  - Monitor glucose on BMP    Dispo:  - SICU  - DNR. End of life care

## 2020-11-19 NOTE — PROGRESS NOTE ADULT - ATTENDING COMMENTS
compassionately extubated last night. comfort measures. per discussion with family, no feeding tube and no lab draws. is DNR DNI.   this AM, on 4 liters NC and neuro exam is spont eye opening but not following, motor exam unchanged from yesterday.\  plan for transfer to palliative service.

## 2020-11-19 NOTE — PROGRESS NOTE ADULT - PROBLEM SELECTOR PLAN 1
no neurosurgical intervention  patient was intubated in 8icu  now s/p compassionate extubation on 11/18.  DNR/DNI

## 2020-11-19 NOTE — PROGRESS NOTE ADULT - PROBLEM SELECTOR PLAN 3
DNR/DNI, conversations mainly held by 8ICU team and NSGY.  Family in agreement with transfer to PCU for symptom focused care  MOLST completed by primary team. PCU team provided sign out

## 2020-11-19 NOTE — PROGRESS NOTE ADULT - CONVERSATION DETAILS
outreach made to son Terence today.  Patient was compassionately extubated on 11/18.   We discussed PCU as an option for transfer to focus on symptom directed care.  Son in agreement with transfer and asked to be notified when moved.  Explained currently transfer depends on bed availability but will have staff contact him at time of transfer.  Emotional support provided, all questions answered.

## 2020-11-19 NOTE — PROGRESS NOTE ADULT - SUBJECTIVE AND OBJECTIVE BOX
SUBJECTIVE AND OBJECTIVE:  INTERVAL HPI/OVERNIGHT EVENTS:    DNR on chart: Yes      Allergies    penicillin (Unknown)    Intolerances    MEDICATIONS  (STANDING):  chlorhexidine 2% Cloths 1 Application(s) Topical <User Schedule>  nystatin Powder 1 Application(s) Topical two times a day    MEDICATIONS  (PRN):  glycopyrrolate Injectable 0.4 milliGRAM(s) IV Push every 6 hours PRN Secretions  LORazepam   Injectable 0.2 milliGRAM(s) IV Push every 1 hour PRN anxiety/agitation/refractory dyspnea  morphine  - Injectable 2 milliGRAM(s) IV Push every 1 hour PRN dyspnea      ITEMS UNCHECKED ARE NOT PRESENT    PRESENT SYMPTOMS: [ ]Unable to obtain due to poor mentation   Source if other than patient:  [ ]Family   [ ]Team     Pain:  [ ]yes [ ]no  QOL impact -   Location -                    Aggravating factors -  Quality -  Radiation -  Timing-  Severity (0-10 scale):  Minimal acceptable level (0-10 scale):     Dyspnea:                           [ ]Mild [ ]Moderate [ ]Severe  Anxiety:                             [ ]Mild [ ]Moderate [ ]Severe  Fatigue:                             [ ]Mild [ ]Moderate [ ]Severe  Nausea:                             [ ]Mild [ ]Moderate [ ]Severe  Loss of appetite:              [ ]Mild [ ]Moderate [ ]Severe  Constipation:                    [ ]Mild [ ]Moderate [ ]Severe    CPOT:    https://www.[facility].org/getattachment/[email or web address]/Critical-Care-Pain-Observation-Tool-(CPOT)    PAIN AD Score:	  http://geriatrictoolkit.[place].[facility]/cog/painad.pdf (Ctrl + left click to view)    Other Symptoms:  [ ]All other review of systems negative     Palliative Performance Status Version 2:         %      http://npcrc.org/files/news/palliative_performance_scale_ppsv2.pdf  PHYSICAL EXAM:  Vital Signs Last 24 Hrs  T(C): 36.6 (19 Nov 2020 11:00), Max: 37 (18 Nov 2020 15:00)  T(F): 97.9 (19 Nov 2020 11:00), Max: 98.6 (18 Nov 2020 15:00)  HR: 103 (19 Nov 2020 11:00) (80 - 114)  BP: 140/65 (19 Nov 2020 11:00) (125/58 - 169/77)  BP(mean): 93 (19 Nov 2020 11:00) (84 - 111)  RR: 15 (19 Nov 2020 11:00) (12 - 25)  SpO2: 95% (19 Nov 2020 11:00) (95% - 100%) I&O's Summary    18 Nov 2020 07:01  -  19 Nov 2020 07:00  --------------------------------------------------------  IN: 605 mL / OUT: 950 mL / NET: -345 mL       GENERAL:  [ ]Alert  [ ]Oriented x   [ ]Lethargic  [ ]Cachexia  [ ]Unarousable  [ ]Verbal  [ ]Non-Verbal  Behavioral:   [ ]Anxiety  [ ]Delirium [ ]Agitation [ ]Other  HEENT:  [ ]Normal   [ ]Dry mouth   [ ]ET Tube/Trach  [ ]Oral lesions  PULMONARY:   [ ]Clear [ ]Tachypnea  [ ]Audible excessive secretions   [ ]Rhonchi        [ ]Right [ ]Left [ ]Bilateral  [ ]Crackles        [ ]Right [ ]Left [ ]Bilateral  [ ]Wheezing     [ ]Right [ ]Left [ ]Bilateral  [ ]Diminished BS [ ] Right [ ]Left [ ]Bilateral  CARDIOVASCULAR:    [ ]Regular [ ]Irregular [ ]Tachy  [ ]Micah [ ]Murmur [ ]Other  GASTROINTESTINAL:  [ ]Soft  [ ]Distended   [ ]+BS  [ ]Non tender [ ]Tender  [ ]PEG [ ]OGT/ NGT   Last BM:      GENITOURINARY:  [ ]Normal [ ]Incontinent   [ ]Oliguria/Anuria   [ ][place]co  MUSCULOSKELETAL:   [ ]Normal   [ ]Weakness  [ ]Bed/Wheelchair bound [ ]Edema  NEUROLOGIC:   [ ]No focal deficits  [ ] Cognitive impairment  [ ] Dysphagia [ ]Dysarthria [ ] Paresis [ ]Other   SKIN:   [ ]Normal  [ ]Rash   [ ]Pressure ulcer(s) [ ]y [ ]n present on admission    CRITICAL CARE:  [ ]Shock Present  [ ]Septic [ ]Cardiogenic [ ]Neurologic [ ]Hypovolemic  [ ]Vasopressors [ ]Inotropes  [ ]Respiratory failure present [ ]Mechanical Ventilation [ ]Non-invasive ventilatory support [ ]High-Flow Mode: CPAP with PS, FiO2: 30, PEEP: 5, PS: 5, MAP: 6, PIP: 11  [ ]Acute  [ ]Chronic [ ]Hypoxic  [ ]Hypercarbic [ ]Other  [ ]Other organ failure     LABS:            RADIOLOGY & ADDITIONAL STUDIES:    Protein Calorie Malnutrition Present: [ ]mild [ ]moderate [ ]severe [ ]underweight [ ]morbid obesity  https://www.andeal.org/vault/2440/web/files/ONC/Table_Clinical%20Characteristics%20to%20Document%20Malnutrition-White%20JV%20et%20al%800796.pdf    Height (cm): 162.5 (11-04-20 @ 07:00)  Weight (kg): 75.2 (11-03-20 @ 21:45)  BMI (kg/m2): 28.5 (11-04-20 @ 07:00)    [ ]PPSV2 < or = 30%  [ ]significant weight loss [ ]poor nutritional intake [ ]anasarca   Albumin, Serum: 3.7 g/dL (11-03-20 @ 21:20)   [ ]Artificial Nutrition    REFERRALS:   [ ]Chaplaincy  [ ]Hospice  [ ]Child Life  [ ]Social Work  [ ]Case management [ ]Holistic Therapy     Goals of Care Document:  ABDIRAHMAN May (11-04-20 @ 15:06)  Goals of Care Conversation:   Participants:  · Participants  Family  · Child(ho)  Benny Estrada    Conversation Discussion:  · Conversation  Diagnosis; MOLST Discussed    What Matters Most To Patient and Family:  · What matters most to patient and family  as per daughter and son, both thinks that patient should not suffer    Personal Advance Directives Treatment Guidelines:   Treatment Guidelines:  · Treatment Guidelines  DNR Order    MOLST:  · Completed  04-Nov-2020  · Updated  04-Nov-2020      Electronic Signatures:  Emily Newman)   (Signed 04-Nov-2020 15:08)  	Authored: Goals of Care Conversation, Personal Advance Directives Treatment Guidelines  Altaf Urena)   (Signed 04-Nov-2020 15:17)  	Co-Signer: Goals of Care Conversation, Personal Advance Directives Treatment Guidelines    Last Updated: 04-Nov-2020 15:17 by Altaf Urena)       SUBJECTIVE AND OBJECTIVE: Patient seen and examined, she is s/p extubation. Breathing comfortably, eyes open but not following commands.     INTERVAL HPI/OVERNIGHT EVENTS: compassionate extubation on 11/18.    DNR on chart: Yes      Allergies    penicillin (Unknown)    Intolerances    MEDICATIONS  (STANDING):  chlorhexidine 2% Cloths 1 Application(s) Topical <User Schedule>  nystatin Powder 1 Application(s) Topical two times a day    MEDICATIONS  (PRN):  glycopyrrolate Injectable 0.4 milliGRAM(s) IV Push every 6 hours PRN Secretions  LORazepam   Injectable 0.2 milliGRAM(s) IV Push every 1 hour PRN anxiety/agitation/refractory dyspnea  morphine  - Injectable 2 milliGRAM(s) IV Push every 1 hour PRN dyspnea      ITEMS UNCHECKED ARE NOT PRESENT    PRESENT SYMPTOMS: [x ]Unable to obtain due to poor mentation   Source if other than patient:  [ ]Family   [ ]Team     Pain:  [ ]yes [ ]no  QOL impact -   Location -                    Aggravating factors -  Quality -  Radiation -  Timing-  Severity (0-10 scale):  Minimal acceptable level (0-10 scale):     Dyspnea:                           [ ]Mild [ ]Moderate [ ]Severe  Anxiety:                             [ ]Mild [ ]Moderate [ ]Severe  Fatigue:                             [ ]Mild [ ]Moderate [ ]Severe  Nausea:                             [ ]Mild [ ]Moderate [ ]Severe  Loss of appetite:              [ ]Mild [ ]Moderate [ ]Severe  Constipation:                    [ ]Mild [ ]Moderate [ ]Severe    CPOT:    https://www.[facility].org/getattachment/[email or web address]/Critical-Care-Pain-Observation-Tool-(CPOT)    PAIN AD Score:	0  http://geriatrictoolkit.[facility]/cog/painad.pdf (Ctrl + left click to view)    Other Symptoms:  [ ]All other review of systems negative     Palliative Performance Status Version 2:        10 %      http://npcrc.org/files/news/palliative_performance_scale_ppsv2.pdf  PHYSICAL EXAM:  Vital Signs Last 24 Hrs  T(C): 36.6 (19 Nov 2020 11:00), Max: 37 (18 Nov 2020 15:00)  T(F): 97.9 (19 Nov 2020 11:00), Max: 98.6 (18 Nov 2020 15:00)  HR: 103 (19 Nov 2020 11:00) (80 - 114)  BP: 140/65 (19 Nov 2020 11:00) (125/58 - 169/77)  BP(mean): 93 (19 Nov 2020 11:00) (84 - 111)  RR: 15 (19 Nov 2020 11:00) (12 - 25)  SpO2: 95% (19 Nov 2020 11:00) (95% - 100%) I&O's Summary    18 Nov 2020 07:01  -  19 Nov 2020 07:00  --------------------------------------------------------  IN: 605 mL / OUT: 950 mL / NET: -345 mL       GENERAL: opens eyes but not following commands  [ ]Alert  [ ]Oriented x   [ ]Lethargic  [ ]Cachexia  [ ]Unarousable  [ ]Verbal  [ x]Non-Verbal  Behavioral:   [ ]Anxiety  [ ]Delirium [ ]Agitation [ ]Other  HEENT:  [ ]Normal   [ x]Dry mouth   [ ]ET Tube/Trach  [ ]Oral lesions  PULMONARY:   [x ]Clear [ ]Tachypnea  [ ]Audible excessive secretions   [ ]Rhonchi        [ ]Right [ ]Left [ ]Bilateral  [ ]Crackles        [ ]Right [ ]Left [ ]Bilateral  [ ]Wheezing     [ ]Right [ ]Left [ ]Bilateral  [ ]Diminished BS [ ] Right [ ]Left [ ]Bilateral  CARDIOVASCULAR:    [ ]Regular [ ]Irregular [ x]Tachy  [ ]Micah [ ]Murmur [ ]Other  GASTROINTESTINAL:  [x ]Soft  [ ]Distended   [x ]+BS  [ ]Non tender [ ]Tender  [ ]PEG [ ]OGT/ NGT   Last BM: per RN flowsheet  GENITOURINARY:  [ ]Normal [x ]Incontinent   [ ]Oliguria/Anuria   [ x][place]  MUSCULOSKELETAL:   [ ]Normal   [ ]Weakness  [x ]Bed/Wheelchair bound [ ]Edema  NEUROLOGIC: not following commands  [ ]No focal deficits  [ ] Cognitive impairment  [ x] Dysphagia [ ]Dysarthria [ ] Paresis [ x]Other   SKIN: ecchymoses  [ ]Normal  [ ]Rash   [ ]Pressure ulcer(s) [ ]y [ ]n present on admission    CRITICAL CARE:  [ ]Shock Present  [ ]Septic [ ]Cardiogenic [ ]Neurologic [ ]Hypovolemic  [ ]Vasopressors [ ]Inotropes  [ ]Respiratory failure present [ ]Mechanical Ventilation [ ]Non-invasive ventilatory support [ ]High-Flow Mode: CPAP with PS, FiO2: 30, PEEP: 5, PS: 5, MAP: 6, PIP: 11  [ ]Acute  [ ]Chronic [ ]Hypoxic  [ ]Hypercarbic [ ]Other  [ ]Other organ failure     LABS: no further blood draws      RADIOLOGY & ADDITIONAL STUDIES: no further imaging studies    Protein Calorie Malnutrition Present: [ ]mild [ x]moderate [ ]severe [ ]underweight [ ]morbid obesity  https://www.andeal.org/vault/2440/web/files/ONC/Table_Clinical%20Characteristics%20to%20Document%20Malnutrition-White%20JV%20et%20al%213017.pdf    Height (cm): 162.5 (11-04-20 @ 07:00)  Weight (kg): 75.2 (11-03-20 @ 21:45)  BMI (kg/m2): 28.5 (11-04-20 @ 07:00)    [ x]PPSV2 < or = 30%  [ ]significant weight loss [x ]poor nutritional intake [ ]anasarca   Albumin, Serum: 3.7 g/dL (11-03-20 @ 21:20)   [ x]Artificial Nutrition    REFERRALS:   [ ]Chaplaincy  [ ]Hospice  [ ]Child Life  [ x]Social Work  [ ]Case management [ ]Holistic Therapy     Goals of Care Document:  ABDIRAHMAN May (11-04-20 @ 15:06)  Goals of Care Conversation:   Participants:  · Participants  Family  · Child(ho)  Terence Radha Estrada    Conversation Discussion:  · Conversation  Diagnosis; MOLST Discussed    What Matters Most To Patient and Family:  · What matters most to patient and family  as per daughter and son, both thinks that patient should not suffer    Personal Advance Directives Treatment Guidelines:   Treatment Guidelines:  · Treatment Guidelines  DNR Order    MOLST:  · Completed  04-Nov-2020  · Updated  04-Nov-2020      Electronic Signatures:  Emily Newman)   (Signed 04-Nov-2020 15:08)  	Authored: Goals of Care Conversation, Personal Advance Directives Treatment Guidelines  Altaf Urena)   (Signed 04-Nov-2020 15:17)  	Co-Signer: Goals of Care Conversation, Personal Advance Directives Treatment Guidelines    Last Updated: 04-Nov-2020 15:17 by Altaf Urena)

## 2020-11-19 NOTE — CHART NOTE - NSCHARTNOTEFT_GEN_A_CORE
Called by RN Jordan as patient received with change in breathing pattern with RR 40's. Patient was given 2mg IV morphine @10PM and continues to appear tachypneic. Family at bedside concerned.   - Will order 0.5mg IV Dilaudid x1 and reassess with RN.   - If patient appears more comfortable, will order 0.5mg IV Dilaudid q1hr prn dyspnea   - Continue with 0.2mg IV Ativan q1hr prn

## 2020-11-19 NOTE — PROGRESS NOTE ADULT - PROBLEM SELECTOR PLAN 4
Medications ordered for symptom management.  Morphine 2mg q1h prn for dyspnea, Ativan 0.2mg q1h prn for anxiety/agitation, glycopyrrolate 0.4mg q6h prn for secretions.   awaiting bed in PCU. Please page if acute uncontrolled symptoms or issues [phone]

## 2020-11-20 DIAGNOSIS — R53.2 FUNCTIONAL QUADRIPLEGIA: ICD-10-CM

## 2020-11-20 DIAGNOSIS — F03.90 UNSPECIFIED DEMENTIA WITHOUT BEHAVIORAL DISTURBANCE: ICD-10-CM

## 2020-11-20 DIAGNOSIS — Z51.5 ENCOUNTER FOR PALLIATIVE CARE: ICD-10-CM

## 2020-11-20 PROCEDURE — 99232 SBSQ HOSP IP/OBS MODERATE 35: CPT

## 2020-11-20 RX ORDER — SODIUM CHLORIDE 9 MG/ML
1000 INJECTION INTRAMUSCULAR; INTRAVENOUS; SUBCUTANEOUS
Refills: 0 | Status: DISCONTINUED | OUTPATIENT
Start: 2020-11-20 | End: 2020-11-26

## 2020-11-20 RX ORDER — HYDROMORPHONE HYDROCHLORIDE 2 MG/ML
1 INJECTION INTRAMUSCULAR; INTRAVENOUS; SUBCUTANEOUS
Refills: 0 | Status: DISCONTINUED | OUTPATIENT
Start: 2020-11-20 | End: 2020-11-25

## 2020-11-20 RX ORDER — HYDROMORPHONE HYDROCHLORIDE 2 MG/ML
0.5 INJECTION INTRAMUSCULAR; INTRAVENOUS; SUBCUTANEOUS
Qty: 100 | Refills: 0 | Status: DISCONTINUED | OUTPATIENT
Start: 2020-11-20 | End: 2020-11-24

## 2020-11-20 RX ADMIN — HYDROMORPHONE HYDROCHLORIDE 1 MILLIGRAM(S): 2 INJECTION INTRAMUSCULAR; INTRAVENOUS; SUBCUTANEOUS at 11:21

## 2020-11-20 RX ADMIN — HYDROMORPHONE HYDROCHLORIDE 0.5 MG/HR: 2 INJECTION INTRAMUSCULAR; INTRAVENOUS; SUBCUTANEOUS at 12:43

## 2020-11-20 RX ADMIN — HYDROMORPHONE HYDROCHLORIDE 0.5 MILLIGRAM(S): 2 INJECTION INTRAMUSCULAR; INTRAVENOUS; SUBCUTANEOUS at 05:14

## 2020-11-20 RX ADMIN — ROBINUL 0.4 MILLIGRAM(S): 0.2 INJECTION INTRAMUSCULAR; INTRAVENOUS at 05:14

## 2020-11-20 RX ADMIN — ROBINUL 0.4 MILLIGRAM(S): 0.2 INJECTION INTRAMUSCULAR; INTRAVENOUS at 11:20

## 2020-11-20 RX ADMIN — NYSTATIN CREAM 1 APPLICATION(S): 100000 CREAM TOPICAL at 17:20

## 2020-11-20 RX ADMIN — NYSTATIN CREAM 1 APPLICATION(S): 100000 CREAM TOPICAL at 05:15

## 2020-11-20 RX ADMIN — HYDROMORPHONE HYDROCHLORIDE 0.5 MILLIGRAM(S): 2 INJECTION INTRAMUSCULAR; INTRAVENOUS; SUBCUTANEOUS at 08:08

## 2020-11-20 RX ADMIN — HYDROMORPHONE HYDROCHLORIDE 0.5 MILLIGRAM(S): 2 INJECTION INTRAMUSCULAR; INTRAVENOUS; SUBCUTANEOUS at 09:47

## 2020-11-20 NOTE — PROGRESS NOTE ADULT - SUBJECTIVE AND OBJECTIVE BOX
SUBJECTIVE AND OBJECTIVE:  INTERVAL HPI/OVERNIGHT EVENTS:    DNR on chart: Yes      Allergies    penicillin (Unknown)    Intolerances    MEDICATIONS  (STANDING):  nystatin Powder 1 Application(s) Topical two times a day    MEDICATIONS  (PRN):  acetaminophen  Suppository .. 650 milliGRAM(s) Rectal every 6 hours PRN Temp greater or equal to 38C (100.4F)  bisacodyl Suppository 10 milliGRAM(s) Rectal daily PRN Constipation  glycopyrrolate Injectable 0.4 milliGRAM(s) IV Push every 6 hours PRN Secretions  HYDROmorphone  Injectable 0.5 milliGRAM(s) IV Push every 1 hour PRN dyspnea or RR>27  HYDROmorphone  Injectable 0.5 milliGRAM(s) IV Push every 1 hour PRN pain  LORazepam   Injectable 0.2 milliGRAM(s) IV Push every 1 hour PRN anxiety/agitation/refractory dyspnea      ITEMS UNCHECKED ARE NOT PRESENT    PRESENT SYMPTOMS: [ ]Unable to obtain due to poor mentation   Source if other than patient:  [ ]Family   [ ]Team     Pain:  [ ]yes [ ]no  QOL impact -   Location -                    Aggravating factors -  Quality -  Radiation -  Timing-  Severity (0-10 scale):  Minimal acceptable level (0-10 scale):     Dyspnea:                           [ ]Mild [ ]Moderate [ ]Severe  Anxiety:                             [ ]Mild [ ]Moderate [ ]Severe  Fatigue:                             [ ]Mild [ ]Moderate [ ]Severe  Nausea:                             [ ]Mild [ ]Moderate [ ]Severe  Loss of appetite:              [ ]Mild [ ]Moderate [ ]Severe  Constipation:                    [ ]Mild [ ]Moderate [ ]Severe    PAIN AD Score:	  http://geriatrictoolkit.[place].[facility]/cog/painad.pdf (Ctrl + left click to view)    Other Symptoms:  [ ]All other review of systems negative     Palliative Performance Status Version 2:         %      http://npcrc.org/files/news/palliative_performance_scale_ppsv2.pdf  PHYSICAL EXAM:  Vital Signs Last 24 Hrs  T(C): 36.7 (20 Nov 2020 07:58), Max: 37.2 (19 Nov 2020 15:00)  T(F): 98.1 (20 Nov 2020 07:58), Max: 99 (19 Nov 2020 15:00)  HR: 118 (20 Nov 2020 07:58) (103 - 118)  BP: 95/64 (20 Nov 2020 07:58) (95/64 - 163/76)  BP(mean): 106 (19 Nov 2020 19:00) (93 - 109)  RR: 18 (20 Nov 2020 07:58) (15 - 28)  SpO2: 89% (20 Nov 2020 07:58) (88% - 98%) I&O's Summary    19 Nov 2020 07:01  -  20 Nov 2020 07:00  --------------------------------------------------------  IN: 0 mL / OUT: 100 mL / NET: -100 mL       GENERAL:  [ ]Alert  [ ]Oriented x   [ ]Lethargic  [ ]Cachexia  [ ]Unarousable  [ ]Verbal  [ ]Non-Verbal  Behavioral:   [ ]Anxiety  [ ]Delirium [ ]Agitation [ ]Other  HEENT:  [ ]Normal   [ ]Dry mouth   [ ]ET Tube/Trach  [ ]Oral lesions  PULMONARY:   [ ]Clear [ ]Tachypnea  [ ]Audible excessive secretions   [ ]Rhonchi        [ ]Right [ ]Left [ ]Bilateral  [ ]Crackles        [ ]Right [ ]Left [ ]Bilateral  [ ]Wheezing     [ ]Right [ ]Left [ ]Bilateral  [ ]Diminished BS [ ] Right [ ]Left [ ]Bilateral  CARDIOVASCULAR:    [ ]Regular [ ]Irregular [ ]Tachy  [ ]Micah [ ]Murmur [ ]Other  GASTROINTESTINAL:  [ ]Soft  [ ]Distended   [ ]+BS  [ ]Non tender [ ]Tender  [ ]PEG [ ]OGT/ NGT   Last BM:      GENITOURINARY:  [ ]Normal [ ]Incontinent   [ ]Oliguria/Anuria   [ ][place]  MUSCULOSKELETAL:   [ ]Normal   [ ]Weakness  [ ]Bed/Wheelchair bound [ ]Edema  NEUROLOGIC:   [ ]No focal deficits  [ ] Cognitive impairment  [ ] Dysphagia [ ]Dysarthria [ ] Paresis [ ]Other   SKIN:   [ ]Normal  [ ]Rash   [ ]Pressure ulcer(s) [ ]y [ ]n present on admission    CRITICAL CARE:  [ ]Shock Present  [ ]Septic [ ]Cardiogenic [ ]Neurologic [ ]Hypovolemic  [ ]Vasopressors [ ]Inotropes  [ ]Respiratory failure present [ ]Mechanical Ventilation [ ]Non-invasive ventilatory support [ ]High-Flow  [ ]Acute  [ ]Chronic [ ]Hypoxic  [ ]Hypercarbic [ ]Other  [ ]Other organ failure     LABS:            RADIOLOGY & ADDITIONAL STUDIES:    Protein Calorie Malnutrition Present: [ ]mild [ ]moderate [ ]severe [ ]underweight [ ]morbid obesity  https://www.andeal.org/vault/2440/web/files/ONC/Table_Clinical%20Characteristics%20to%20Document%20Malnutrition-White%20JV%20et%20al%202012.pdf    Height (cm): 162.5 (11-04-20 @ 07:00)  Weight (kg): 75.2 (11-03-20 @ 21:45)  BMI (kg/m2): 28.5 (11-04-20 @ 07:00)    [ ]PPSV2 < or = 30%  [ ]significant weight loss [ ]poor nutritional intake [ ]anasarca   Albumin, Serum: 3.7 g/dL (11-03-20 @ 21:20)   [ ]Artificial Nutrition    REFERRALS:   [ ]Chaplaincy  [ ]Hospice  [ ]Child Life  [ ]Social Work  [ ]Case management [ ]Holistic Therapy     Goals of Care Document:  ABDIRAHMAN May (11-04-20 @ 15:06)  Goals of Care Conversation:   Participants:  · Participants  Family  · Child(ho)  Benny Estrada    Conversation Discussion:  · Conversation  Diagnosis; KOBE Discussed    What Matters Most To Patient and Family:  · What matters most to patient and family  as per daughter and son, both thinks that patient should not suffer    Personal Advance Directives Treatment Guidelines:   Treatment Guidelines:  · Treatment Guidelines  DNR Order    MOLST:  · Completed  04-Nov-2020  · Updated  04-Nov-2020   SUBJECTIVE AND OBJECTIVE:  INTERVAL HPI/OVERNIGHT EVENTS:    Patient remains to be tachypneic and tachycardic . She had received robinul  twice, Dilaudid .5 mg X2 as well as ativan .2 mg. She appears mottled. Currently unable to provide ROS given mental status.    DNR on chart: Yes      Allergies    penicillin (Unknown)    Intolerances    MEDICATIONS  (STANDING):  nystatin Powder 1 Application(s) Topical two times a day    MEDICATIONS  (PRN):  acetaminophen  Suppository .. 650 milliGRAM(s) Rectal every 6 hours PRN Temp greater or equal to 38C (100.4F)  bisacodyl Suppository 10 milliGRAM(s) Rectal daily PRN Constipation  glycopyrrolate Injectable 0.4 milliGRAM(s) IV Push every 6 hours PRN Secretions  HYDROmorphone  Injectable 0.5 milliGRAM(s) IV Push every 1 hour PRN dyspnea or RR>27  HYDROmorphone  Injectable 0.5 milliGRAM(s) IV Push every 1 hour PRN pain  LORazepam   Injectable 0.2 milliGRAM(s) IV Push every 1 hour PRN anxiety/agitation/refractory dyspnea      ITEMS UNCHECKED ARE NOT PRESENT    PRESENT SYMPTOMS: [ ]Unable to obtain due to poor mentation   Source if other than patient:  [ ]Family   [ ]Team     Pain:  [ ]yes [ ]no  QOL impact -   Location -                    Aggravating factors -  Quality -  Radiation -  Timing-  Severity (0-10 scale):  Minimal acceptable level (0-10 scale):     Dyspnea:                           [ ]Mild [ ]Moderate [ ]Severe  Anxiety:                             [X ]Mild [ ]Moderate [ ]Severe  Fatigue:                             [ ]Mild [ ]Moderate [X ]Severe  Nausea:                             [ ]Mild [ ]Moderate [ ]Severe  Loss of appetite:              [ ]Mild [ ]Moderate [ ]Severe  Constipation:                    [ ]Mild [ ]Moderate [ ]Severe    PAIN AD Score:	  http://geriatrictoolkit.[place].[facility]/cog/painad.pdf (Ctrl + left click to view)    Other Symptoms:  [ ]All other review of systems negative     Palliative Performance Status Version 2:         %      http://npcrc.org/files/news/palliative_performance_scale_ppsv2.pdf  PHYSICAL EXAM:  Vital Signs Last 24 Hrs  T(C): 36.7 (20 Nov 2020 07:58), Max: 37.2 (19 Nov 2020 15:00)  T(F): 98.1 (20 Nov 2020 07:58), Max: 99 (19 Nov 2020 15:00)  HR: 118 (20 Nov 2020 07:58) (103 - 118)  BP: 95/64 (20 Nov 2020 07:58) (95/64 - 163/76)  BP(mean): 106 (19 Nov 2020 19:00) (93 - 109)  RR: 18 (20 Nov 2020 07:58) (15 - 28)  SpO2: 89% (20 Nov 2020 07:58) (88% - 98%) I&O's Summary    19 Nov 2020 07:01  -  20 Nov 2020 07:00  --------------------------------------------------------  IN: 0 mL / OUT: 100 mL / NET: -100 mL       GENERAL:  [ ]Alert  [X ]Oriented x 0  [ ]Lethargic  [ ]Cachexia  [ ]Unarousable  [ ]Verbal  [X ]Non-Verbal  Behavioral:   [ ]Anxiety  [ ]Delirium [ ]Agitation [ ]Other  HEENT:  [X ]Normal   [ ]Dry mouth   [ ]ET Tube/Trach  [ ]Oral lesions  PULMONARY:   [X ]Clear [X ]Tachypnea  [ ]Audible excessive secretions   [ ]Rhonchi        [ ]Right [ ]Left [ ]Bilateral  [ ]Crackles        [ ]Right [ ]Left [ ]Bilateral  [ ]Wheezing     [ ]Right [ ]Left [ ]Bilateral  [ ]Diminished BS [ ] Right [ ]Left [ ]Bilateral  CARDIOVASCULAR:    [ ]Regular [ ]Irregular [X ]Tachy  [ ]Micah [ ]Murmur [ ]Other  GASTROINTESTINAL:  [ ]Soft  [ ]Distended   [ X]+BS  [ ]Non tender [X ]Tender  [ ]PEG [ ]OGT/ NGT   Last BM:      GENITOURINARY:  [ ]Normal [ ]Incontinent   [ ]Oliguria/Anuria   [ ][place]  MUSCULOSKELETAL:   [ ]Normal   [X ]Weakness  [ ]Bed/Wheelchair bound [ ]Edema  NEUROLOGIC:   [X ]No focal deficits  [ ] Cognitive impairment  [ ] Dysphagia [ ]Dysarthria [ ] Paresis [ ]Other   SKIN:   [ ]Normal  [ ]Rash   [ ]Pressure ulcer(s) [ ]y [ ]n present on admission    CRITICAL CARE:  [ ]Shock Present  [ ]Septic [ ]Cardiogenic [X ]Neurologic [ ]Hypovolemic  [ ]Vasopressors [ ]Inotropes  [ ]Respiratory failure present [ ]Mechanical Ventilation [ ]Non-invasive ventilatory support [ ]High-Flow  [ ]Acute  [ ]Chronic [ ]Hypoxic  [ ]Hypercarbic [ ]Other  [ ]Other organ failure     LABS:            RADIOLOGY & ADDITIONAL STUDIES:    Protein Calorie Malnutrition Present: [ ]mild [ ]moderate [ ]severe [ ]underweight [ ]morbid obesity  https://www.andeal.org/vault/2440/web/files/ONC/Table_Clinical%20Characteristics%20to%20Document%20Malnutrition-White%20JV%20et%20al%200525.pdf    Height (cm): 162.5 (11-04-20 @ 07:00)  Weight (kg): 75.2 (11-03-20 @ 21:45)  BMI (kg/m2): 28.5 (11-04-20 @ 07:00)    [ ]PPSV2 < or = 30%  [ ]significant weight loss [ ]poor nutritional intake [ ]anasarca   Albumin, Serum: 3.7 g/dL (11-03-20 @ 21:20)   [ ]Artificial Nutrition    REFERRALS:   [ ]Chaplaincy  [ ]Hospice  [ ]Child Life  [ ]Social Work  [ ]Case management [ ]Holistic Therapy     Goals of Care Document:  ABDIRAHMAN May (11-04-20 @ 15:06)  Goals of Care Conversation:   Participants:  · Participants  Family  · Child(toma  Benny Estrada    Conversation Discussion:  · Conversation  Diagnosis; MOLST Discussed    What Matters Most To Patient and Family:  · What matters most to patient and family  as per daughter and son, both thinks that patient should not suffer    Personal Advance Directives Treatment Guidelines:   Treatment Guidelines:  · Treatment Guidelines  DNR Order    MOLST:  · Completed  04-Nov-2020  · Updated  04-Nov-2020   SUBJECTIVE AND OBJECTIVE:  INTERVAL HPI/OVERNIGHT EVENTS:    Patient remains to be tachypneic and tachycardic . She had received robinul  twice, Dilaudid .5 mg X2 as well as ativan .2 mg. She appears mottled. Currently unable to provide ROS given mental status.    DNR on chart: Yes    Allergies    penicillin (Unknown)    Intolerances    MEDICATIONS  (STANDING):  nystatin Powder 1 Application(s) Topical two times a day    MEDICATIONS  (PRN):  acetaminophen  Suppository .. 650 milliGRAM(s) Rectal every 6 hours PRN Temp greater or equal to 38C (100.4F)  bisacodyl Suppository 10 milliGRAM(s) Rectal daily PRN Constipation  glycopyrrolate Injectable 0.4 milliGRAM(s) IV Push every 6 hours PRN Secretions  HYDROmorphone  Injectable 0.5 milliGRAM(s) IV Push every 1 hour PRN dyspnea or RR>27  HYDROmorphone  Injectable 0.5 milliGRAM(s) IV Push every 1 hour PRN pain  LORazepam   Injectable 0.2 milliGRAM(s) IV Push every 1 hour PRN anxiety/agitation/refractory dyspnea      ITEMS UNCHECKED ARE NOT PRESENT    PRESENT SYMPTOMS: [ ]Unable to obtain due to poor mentation   Source if other than patient:  [ ]Family   [ ]Team     Pain:  [ ]yes [ ]no  QOL impact -   Location -                    Aggravating factors -  Quality -  Radiation -  Timing-  Severity (0-10 scale):  Minimal acceptable level (0-10 scale):     Dyspnea:                           [ ]Mild [ ]Moderate [ ]Severe  Anxiety:                             [X ]Mild [ ]Moderate [ ]Severe  Fatigue:                             [ ]Mild [ ]Moderate [X ]Severe  Nausea:                             [ ]Mild [ ]Moderate [ ]Severe  Loss of appetite:              [ ]Mild [ ]Moderate [ ]Severe  Constipation:                    [ ]Mild [ ]Moderate [ ]Severe    PAIN AD Score:	  http://geriatrictoolkit.[place].[email or web address]/cog/painad.pdf (Ctrl + left click to view)    Other Symptoms:  [ ]All other review of systems negative     Palliative Performance Status Version 2:         %      http://npcrc.org/files/news/palliative_performance_scale_ppsv2.pdf  PHYSICAL EXAM:  Vital Signs Last 24 Hrs  T(C): 36.7 (20 Nov 2020 07:58), Max: 37.2 (19 Nov 2020 15:00)  T(F): 98.1 (20 Nov 2020 07:58), Max: 99 (19 Nov 2020 15:00)  HR: 118 (20 Nov 2020 07:58) (103 - 118)  BP: 95/64 (20 Nov 2020 07:58) (95/64 - 163/76)  BP(mean): 106 (19 Nov 2020 19:00) (93 - 109)  RR: 18 (20 Nov 2020 07:58) (15 - 28)  SpO2: 89% (20 Nov 2020 07:58) (88% - 98%) I&O's Summary    19 Nov 2020 07:01  -  20 Nov 2020 07:00  --------------------------------------------------------  IN: 0 mL / OUT: 100 mL / NET: -100 mL       GENERAL:  [ ]Alert  [X ]Oriented x 0  [ ]Lethargic  [ ]Cachexia  [ ]Unarousable  [ ]Verbal  [X ]Non-Verbal  Behavioral:   [ ]Anxiety  [ ]Delirium [ ]Agitation [ ]Other  HEENT:  [X ]Normal   [ ]Dry mouth   [ ]ET Tube/Trach  [ ]Oral lesions  PULMONARY:   [X ]Clear [X ]Tachypnea  [ ]Audible excessive secretions   [ ]Rhonchi        [ ]Right [ ]Left [ ]Bilateral  [ ]Crackles        [ ]Right [ ]Left [ ]Bilateral  [ ]Wheezing     [ ]Right [ ]Left [ ]Bilateral  [ ]Diminished BS [ ] Right [ ]Left [ ]Bilateral  CARDIOVASCULAR:    [ ]Regular [ ]Irregular [X ]Tachy  [ ]Micah [ ]Murmur [ ]Other  GASTROINTESTINAL:  [ ]Soft  [ ]Distended   [ X]+BS  [ ]Non tender [X ]Tender  [ ]PEG [ ]OGT/ NGT   Last BM:      GENITOURINARY:  [ ]Normal [ ]Incontinent   [ ]Oliguria/Anuria   [ ]Carrasco  MUSCULOSKELETAL:   [ ]Normal   [X ]Weakness  [ ]Bed/Wheelchair bound [ ]Edema  NEUROLOGIC:   [X ]No focal deficits  [ ] Cognitive impairment  [ ] Dysphagia [ ]Dysarthria [ ] Paresis [ ]Other   SKIN:   [ ]Normal  [ ]Rash   [ ]Pressure ulcer(s) [ ]y [ ]n present on admission [X] Mottled skin    CRITICAL CARE:  [ ]Shock Present  [ ]Septic [ ]Cardiogenic [X ]Neurologic [ ]Hypovolemic  [ ]Vasopressors [ ]Inotropes  [ ]Respiratory failure present [ ]Mechanical Ventilation [ ]Non-invasive ventilatory support [ ]High-Flow  [ ]Acute  [ ]Chronic [ ]Hypoxic  [ ]Hypercarbic [ ]Other  [ ]Other organ failure     LABS:            RADIOLOGY & ADDITIONAL STUDIES:    Protein Calorie Malnutrition Present: [ ]mild [ ]moderate [ ]severe [ ]underweight [ ]morbid obesity  https://www.andeal.org/vault/2440/web/files/ONC/Table_Clinical%20Characteristics%20to%20Document%20Malnutrition-White%20JV%20et%20al%936600.pdf    Height (cm): 162.5 (11-04-20 @ 07:00)  Weight (kg): 75.2 (11-03-20 @ 21:45)  BMI (kg/m2): 28.5 (11-04-20 @ 07:00)    [ ]PPSV2 < or = 30%  [ ]significant weight loss [ ]poor nutritional intake [ ]anasarca   Albumin, Serum: 3.7 g/dL (11-03-20 @ 21:20)   [ ]Artificial Nutrition    REFERRALS:   [ ]Chaplaincy  [ ]Hospice  [ ]Child Life  [ ]Social Work  [ ]Case management [ ]Holistic Therapy     Goals of Care Document:  ABDIRAHMAN May (11-04-20 @ 15:06)  Goals of Care Conversation:   Participants:  · Participants  Family  · Child(toma Pratt Radha Estrada    Conversation Discussion:  · Conversation  Diagnosis; MOLST Discussed    What Matters Most To Patient and Family:  · What matters most to patient and family  as per daughter and son, both thinks that patient should not suffer    Personal Advance Directives Treatment Guidelines:   Treatment Guidelines:  · Treatment Guidelines  DNR Order    MOLST:  · Completed  04-Nov-2020  · Updated  04-Nov-2020   GAP TEAM PALLIATIVE CARE UNIT PROGRESS NOTE:      [  ] Patient on hospice program.    INDICATION FOR PALLIATIVE CARE UNIT SERVICES: Sx management after extubation     INTERVAL HPI/OVERNIGHT EVENTS:Patient remains to be tachypneic and tachycardic . She had received robinul  twice, Dilaudid .5 mg X2 as well as ativan .2 mg. She appears mottled. Currently unable to provide ROS given mental status.    DNR on chart: Yes    Allergies    penicillin (Unknown)    Intolerances    MEDICATIONS  (STANDING):  nystatin Powder 1 Application(s) Topical two times a day    MEDICATIONS  (PRN):  acetaminophen  Suppository .. 650 milliGRAM(s) Rectal every 6 hours PRN Temp greater or equal to 38C (100.4F)  bisacodyl Suppository 10 milliGRAM(s) Rectal daily PRN Constipation  glycopyrrolate Injectable 0.4 milliGRAM(s) IV Push every 6 hours PRN Secretions  HYDROmorphone  Injectable 0.5 milliGRAM(s) IV Push every 1 hour PRN dyspnea or RR>27  HYDROmorphone  Injectable 0.5 milliGRAM(s) IV Push every 1 hour PRN pain  LORazepam   Injectable 0.2 milliGRAM(s) IV Push every 1 hour PRN anxiety/agitation/refractory dyspnea      ITEMS UNCHECKED ARE NOT PRESENT    PRESENT SYMPTOMS: [x ]Unable to obtain due to poor mentation   Source if other than patient:  [ ]Family   [x ]Team     Pain:  [ ]yes [ x]no- See PAIN AD score  QOL impact -   Location -                    Aggravating factors -  Quality -  Radiation -  Timing-  Severity (0-10 scale):  Minimal acceptable level (0-10 scale):     Dyspnea:                           [ ]Mild [ ]Moderate [ ]Severe  Anxiety:                             [X ]Mild [ ]Moderate [ ]Severe  Fatigue:                             [ ]Mild [ ]Moderate [X ]Severe  Nausea:                             [ ]Mild [ ]Moderate [ ]Severe  Loss of appetite:              [ ]Mild [ ]Moderate [ ]Severe  Constipation:                    [ ]Mild [ ]Moderate [ ]Severe    PAIN AD Score:	2  http://geriatrictoolkit.[place].[facility]/cog/painad.pdf (Ctrl + left click to view)    Other Symptoms:  [ ]All other review of systems negative     Palliative Performance Status Version 2:   10      %      http://npcrc.org/files/news/palliative_performance_scale_ppsv2.pdf    PHYSICAL EXAM:  Vital Signs Last 24 Hrs  T(C): 36.7 (20 Nov 2020 07:58), Max: 37.2 (19 Nov 2020 15:00)  T(F): 98.1 (20 Nov 2020 07:58), Max: 99 (19 Nov 2020 15:00)  HR: 118 (20 Nov 2020 07:58) (103 - 118)  BP: 95/64 (20 Nov 2020 07:58) (95/64 - 163/76)  BP(mean): 106 (19 Nov 2020 19:00) (93 - 109)  RR: 18 (20 Nov 2020 07:58) (15 - 28)  SpO2: 89% (20 Nov 2020 07:58) (88% - 98%) I&O's Summary    19 Nov 2020 07:01  -  20 Nov 2020 07:00  --------------------------------------------------------  IN: 0 mL / OUT: 100 mL / NET: -100 mL       GENERAL:  [ ]Alert  [ ]Oriented x 0  [ ]Lethargic  [ ]Cachexia  [x ]Unarousable  [ ]Verbal  [X ]Non-Verbal  Behavioral:   [ ]Anxiety  [ ]Delirium [ ]Agitation [ ]Other  HEENT:  [X ]Normal   [ ]Dry mouth   [ ]ET Tube/Trach  [ ]Oral lesions  PULMONARY:   [X ]Clear [X ]Tachypnea  [ ]Audible excessive secretions   [ ]Rhonchi        [ ]Right [ ]Left [ ]Bilateral  [ ]Crackles        [ ]Right [ ]Left [ ]Bilateral  [ ]Wheezing     [ ]Right [ ]Left [ ]Bilateral  [ ]Diminished BS [ ] Right [ ]Left [ ]Bilateral  CARDIOVASCULAR:    [ ]Regular [ ]Irregular [X ]Tachy  [ ]Micah [ ]Murmur [ ]Other  GASTROINTESTINAL:  [ ]Soft  [ ]Distended   [ X]+BS  [ ]Non tender [X ]Tender  [ ]PEG [ ]OGT/ NGT   Last BM: 11/17     GENITOURINARY:  [ ]Normal [x ]Incontinent   [ ]Oliguria/Anuria   [ ]Carrasco  MUSCULOSKELETAL:   [ ]Normal   [ ]Weakness  [x ]Bed/Wheelchair bound [ ]Edema  NEUROLOGIC:   [ ]No focal deficits  [x ] Cognitive impairment  [ ] Dysphagia [ ]Dysarthria [ ] Paresis [ ]Other   SKIN:  +blisters on buttock, please see nursing assessment   [ ]Normal  [ ]Rash   [ ]Pressure ulcer(s) [ ]y [ ]n present on admission [X] Mottled skin    CRITICAL CARE:  [ ]Shock Present  [ ]Septic [ ]Cardiogenic [X ]Neurologic [ ]Hypovolemic  [ ]Vasopressors [ ]Inotropes  [x ]Respiratory failure present [ ]Mechanical Ventilation [ ]Non-invasive ventilatory support [ ]High-Flow  [ ]Acute  [ ]Chronic [ ]Hypoxic  [ ]Hypercarbic [ ]Other  [ ]Other organ failure     LABS: no new imaging    RADIOLOGY & ADDITIONAL STUDIES: no new imaging    Protein Calorie Malnutrition Present: [ ]mild [ ]moderate [x ]severe [ ]underweight [ ]morbid obesity  https://www.andeal.org/vault/2440/web/files/ONC/Table_Clinical%20Characteristics%20to%20Document%20Malnutrition-White%20JV%20et%20al%405524.pdf    Height (cm): 162.5 (11-04-20 @ 07:00)  Weight (kg): 75.2 (11-03-20 @ 21:45)  BMI (kg/m2): 28.5 (11-04-20 @ 07:00)    [x ]PPSV2 < or = 30%  [ ]significant weight loss [ ]poor nutritional intake [ ]anasarca   Albumin, Serum: 3.7 g/dL (11-03-20 @ 21:20)   [ ]Artificial Nutrition    REFERRALS:   [ ]Chaplaincy  [ ]Hospice  [ ]Child Life  [x ]Social Work  [ ]Case management [ ]Holistic Therapy     Goals of Care Document:  ABDIRAHMAN May (11-04-20 @ 15:06)  Goals of Care Conversation:   Participants:  · Participants  Family  · Child(toma Pratt anaya Marie Sean    Conversation Discussion:  · Conversation  Diagnosis; KOBE Discussed    What Matters Most To Patient and Family:  · What matters most to patient and family  as per daughter and son, both thinks that patient should not suffer    Personal Advance Directives Treatment Guidelines:   Treatment Guidelines:  · Treatment Guidelines  DNR Order    MOLST:  · Completed  04-Nov-2020  · Updated  04-Nov-2020     GAP TEAM PALLIATIVE CARE UNIT PROGRESS NOTE:      [  ] Patient on hospice program.    INDICATION FOR PALLIATIVE CARE UNIT SERVICES: Sx management after extubation     INTERVAL HPI/OVERNIGHT EVENTS:Patient remains to be tachypneic and tachycardic . She had received robinul  twice, Dilaudid .5 mg X2 as well as ativan .2 mg. She appears mottled. Currently unable to provide ROS given mental status.    DNR on chart: Yes    Allergies    penicillin (Unknown)    Intolerances    MEDICATIONS  (STANDING):  nystatin Powder 1 Application(s) Topical two times a day    MEDICATIONS  (PRN):  acetaminophen  Suppository .. 650 milliGRAM(s) Rectal every 6 hours PRN Temp greater or equal to 38C (100.4F)  bisacodyl Suppository 10 milliGRAM(s) Rectal daily PRN Constipation  glycopyrrolate Injectable 0.4 milliGRAM(s) IV Push every 6 hours PRN Secretions  HYDROmorphone  Injectable 0.5 milliGRAM(s) IV Push every 1 hour PRN dyspnea or RR>27  HYDROmorphone  Injectable 0.5 milliGRAM(s) IV Push every 1 hour PRN pain  LORazepam   Injectable 0.2 milliGRAM(s) IV Push every 1 hour PRN anxiety/agitation/refractory dyspnea      ITEMS UNCHECKED ARE NOT PRESENT    PRESENT SYMPTOMS: [x ]Unable to obtain due to poor mentation   Source if other than patient:  [ ]Family   [x ]Team     Pain:  [ x]yes [  ]no- See PAIN AD score miminal  QOL impact -   Location -                    Aggravating factors -  Quality -  Radiation -  Timing-  Severity (0-10 scale):  Minimal acceptable level (0-10 scale):     Dyspnea:                           [x ]Mild [ ]Moderate [ ]Severe  Anxiety:                             [X ]Mild [ ]Moderate [ ]Severe  Fatigue:                             [ ]Mild [ ]Moderate [X ]Severe  Nausea:                             [ ]Mild [ ]Moderate [ ]Severe  Loss of appetite:              [ ]Mild [ ]Moderate [ ]Severe  Constipation:                    [ ]Mild [ ]Moderate [ ]Severe    PAIN AD Score:	2  http://geriatrictoolkit.[place].[facility]/cog/painad.pdf (Ctrl + left click to view)    Other Symptoms:  [ ]All other review of systems negative     Palliative Performance Status Version 2:   10      %      http://npcrc.org/files/news/palliative_performance_scale_[email or web address].pdf    PHYSICAL EXAM:  Vital Signs Last 24 Hrs  T(C): 36.7 (20 Nov 2020 07:58), Max: 37.2 (19 Nov 2020 15:00)  T(F): 98.1 (20 Nov 2020 07:58), Max: 99 (19 Nov 2020 15:00)  HR: 118 (20 Nov 2020 07:58) (103 - 118)  BP: 95/64 (20 Nov 2020 07:58) (95/64 - 163/76)  BP(mean): 106 (19 Nov 2020 19:00) (93 - 109)  RR: 18 (20 Nov 2020 07:58) (15 - 28)  SpO2: 89% (20 Nov 2020 07:58) (88% - 98%) I&O's Summary    19 Nov 2020 07:01  -  20 Nov 2020 07:00  --------------------------------------------------------  IN: 0 mL / OUT: 100 mL / NET: -100 mL       GENERAL:  [ ]Alert  [ ]Oriented x 0  [ ]Lethargic  [ ]Cachexia  [x ]Unarousable  [ ]Verbal  [X ]Non-Verbal  Behavioral:   [ ]Anxiety  [ ]Delirium [ ]Agitation [ ]Other  HEENT:  [X ]Normal   [ ]Dry mouth   [ ]ET Tube/Trach  [ ]Oral lesions  PULMONARY:   [X ]Clear [X ]Tachypnea  [ ]Audible excessive secretions   [ ]Rhonchi        [ ]Right [ ]Left [ ]Bilateral  [ ]Crackles        [ ]Right [ ]Left [ ]Bilateral  [ ]Wheezing     [ ]Right [ ]Left [ ]Bilateral  [ ]Diminished BS [ ] Right [ ]Left [ ]Bilateral  CARDIOVASCULAR:    [x]Regular [ ]Irregular [X ]Tachy  [ ]Micah [ ]Murmur [ ]Other  GASTROINTESTINAL:  [x]Soft  [ ]Distended   [ X]+BS  [ ]Non tender [X ]Tender  [ ]PEG [ ]OGT/ NGT   Last BM: 11/17     GENITOURINARY:  [ ]Normal [x ]Incontinent   [ ]Oliguria/Anuria   [ ][place]  MUSCULOSKELETAL:   [ ]Normal   [ ]Weakness  [x ]Bed/Wheelchair bound [ ]Edema  NEUROLOGIC:   [ ]No focal deficits  [x ] Cognitive impairment  [ ] Dysphagia [ ]Dysarthria [ ] Paresis [ ]Other   SKIN:  +blisters on buttock, please see nursing assessment   [ ]Normal  [ ]Rash   [ ]Pressure ulcer(s) [ ]y [ ]n present on admission [X] Mottled skin    CRITICAL CARE:  [ ]Shock Present  [ ]Septic [ ]Cardiogenic [X ]Neurologic [ ]Hypovolemic  [ ]Vasopressors [ ]Inotropes  [x ]Respiratory failure present [ ]Mechanical Ventilation [ ]Non-invasive ventilatory support [ ]High-Flow  [x ]Acute  [ ]Chronic [x ]Hypoxic  [ ]Hypercarbic [ ]Other  [x ]Other organ failure brain    LABS: no new imaging    RADIOLOGY & ADDITIONAL STUDIES: no new imaging    Protein Calorie Malnutrition Present: [ ]mild [ ]moderate [x ]severe [ ]underweight [ ]morbid obesity  https://www.andeal.org/vault/2440/web/files/ONC/Table_Clinical%20Characteristics%20to%20Document%20Malnutrition-White%20JV%20et%20al%916224.pdf    Height (cm): 162.5 (11-04-20 @ 07:00)  Weight (kg): 75.2 (11-03-20 @ 21:45)  BMI (kg/m2): 28.5 (11-04-20 @ 07:00)    [x ]PPSV2 < or = 30%  [ ]significant weight loss [ ]poor nutritional intake [ ]anasarca   Albumin, Serum: 3.7 g/dL (11-03-20 @ 21:20)   [ ]Artificial Nutrition    REFERRALS:   [ ]Chaplaincy  [ ]Hospice  [ ]Child Life  [x ]Social Work  [ ]Case management [ ]Holistic Therapy     Goals of Care Document:  ABDIRAHMAN May (11-04-20 @ 15:06)  Goals of Care Conversation:   Participants:  · Participants  Family  · Child(toma Pratt anaya Frank Estrada    Conversation Discussion:  · Conversation  Diagnosis; KOBE Discussed    What Matters Most To Patient and Family:  · What matters most to patient and family  as per daughter and son, both thinks that patient should not suffer    Personal Advance Directives Treatment Guidelines:   Treatment Guidelines:  · Treatment Guidelines  DNR Order    MOLST:  · Completed  04-Nov-2020  · Updated  04-Nov-2020

## 2020-11-20 NOTE — PROGRESS NOTE ADULT - ATTENDING COMMENTS
I have personally seen and examined this patient and agree with the above assessment and plan, which I have reviewed and edited where appropriate.   Patient with traumatic SDH, s/p compassionate extubation.  Transferred to PCU for symptom directed care at the end of life.  Patient with dyspnea, likely driven by poor airway tone and CNS injury.  To be managed with positioning, oral hygiene and opiates as required for patient comfort.  Prognosis is hours to days, weeks less likely at this time.  Family  educated as to what to expect.  Questions answered.  Emotional support provided.

## 2020-11-20 NOTE — PROGRESS NOTE ADULT - PROBLEM SELECTOR PLAN 2
Patient received Robinul X2 ativan .2 mg, and Dilaudid 1 mg in 24 hours   Robinul .4 mg IV q6 PRN   Dilaudid .5 mg IV q1 hour for severe pain PRN  Dilaudid .5 mg IV q1 hour PRN for dyspnea Patient received Robinul X2 ativan .2 mg, and Dilaudid 1 mg in 24 hours   Robinul .4 mg IV q6 PRN   Given frequent requirements for Dilaudid will place patient on Dilaudid infusion at rate of .5 mg/ hour   1 mg of Dilaudid for breakthrough pain for both dyspnea and pain

## 2020-11-20 NOTE — PROGRESS NOTE ADULT - ASSESSMENT
81 yo F with hx of dementia, hydrocephalus s/p  shunt, recent hip fracture presents from SNF with AMS intubated with findings of large SHD with midline shift and L 8-10 rib fractures. Patient with midline shift and decreased subdural hematoma 11mm-> 7mm, now s/p extubation being further cared and managed in PCU

## 2020-11-20 NOTE — PROGRESS NOTE ADULT - PROBLEM SELECTOR PLAN 1
Patient s/p extubation on 11.18, with CT initially noting subdural hematoma 11mm, now with midline shift and reduced hematoma to 7 mm  Patient currently AOX0, family in agreement with symptom directed care

## 2020-11-20 NOTE — PROGRESS NOTE ADULT - PROBLEM SELECTOR PLAN 4
Ngoc Alexis MD  5/19/2017                 Son in agreement with DNR/ DNI   To c/w symptom directed care in ICU Symptom directed care  FAST 7F  No PO route  bedbound

## 2020-11-21 PROCEDURE — 99232 SBSQ HOSP IP/OBS MODERATE 35: CPT | Mod: GC

## 2020-11-21 RX ORDER — ZINC OXIDE 200 MG/G
1 OINTMENT TOPICAL
Refills: 0 | Status: DISCONTINUED | OUTPATIENT
Start: 2020-11-21 | End: 2020-11-26

## 2020-11-21 RX ADMIN — HYDROMORPHONE HYDROCHLORIDE 0.5 MG/HR: 2 INJECTION INTRAMUSCULAR; INTRAVENOUS; SUBCUTANEOUS at 19:44

## 2020-11-21 RX ADMIN — HYDROMORPHONE HYDROCHLORIDE 0.5 MG/HR: 2 INJECTION INTRAMUSCULAR; INTRAVENOUS; SUBCUTANEOUS at 07:35

## 2020-11-21 RX ADMIN — HYDROMORPHONE HYDROCHLORIDE 0.5 MG/HR: 2 INJECTION INTRAMUSCULAR; INTRAVENOUS; SUBCUTANEOUS at 15:29

## 2020-11-21 RX ADMIN — NYSTATIN CREAM 1 APPLICATION(S): 100000 CREAM TOPICAL at 17:03

## 2020-11-21 RX ADMIN — SODIUM CHLORIDE 10 MILLILITER(S): 9 INJECTION INTRAMUSCULAR; INTRAVENOUS; SUBCUTANEOUS at 06:27

## 2020-11-21 RX ADMIN — NYSTATIN CREAM 1 APPLICATION(S): 100000 CREAM TOPICAL at 06:25

## 2020-11-21 NOTE — PROGRESS NOTE ADULT - ASSESSMENT
79 yo F with hx of dementia, hydrocephalus s/p  shunt, recent hip fracture presents from SNF with AMS intubated with findings of large SHD with midline shift and L 8-10 rib fractures. Patient with midline shift and decreased subdural hematoma 11mm-> 7mm, now s/p extubation being further cared and managed in PCU

## 2020-11-21 NOTE — PROGRESS NOTE ADULT - SUBJECTIVE AND OBJECTIVE BOX
GAP TEAM PALLIATIVE CARE UNIT PROGRESS NOTE:      [  ] Patient on hospice program.    INDICATION FOR PALLIATIVE CARE UNIT SERVICES: Sx management after extubation     INTERVAL HPI/OVERNIGHT EVENTS: Patient hypoxic this AM, O2 84% on 2L NC. Appears comfortable, no signs of respiratory distress. She received IV robinul 0.4mg x1 and IV dialudid 1mg x1 in last 24 hours. Continues on IV dilaudid infusion 0.5mg/hr. Currently unable to provide ROS given mental status.    DNR on chart: Yes    Allergies    penicillin (Unknown)    Intolerances    MEDICATIONS  (STANDING):  nystatin Powder 1 Application(s) Topical two times a day    MEDICATIONS  (PRN):  acetaminophen  Suppository .. 650 milliGRAM(s) Rectal every 6 hours PRN Temp greater or equal to 38C (100.4F)  bisacodyl Suppository 10 milliGRAM(s) Rectal daily PRN Constipation  glycopyrrolate Injectable 0.4 milliGRAM(s) IV Push every 6 hours PRN Secretions  HYDROmorphone  Injectable 0.5 milliGRAM(s) IV Push every 1 hour PRN dyspnea or RR>27  HYDROmorphone  Injectable 0.5 milliGRAM(s) IV Push every 1 hour PRN pain  LORazepam   Injectable 0.2 milliGRAM(s) IV Push every 1 hour PRN anxiety/agitation/refractory dyspnea      ITEMS UNCHECKED ARE NOT PRESENT    PRESENT SYMPTOMS: [x ]Unable to obtain due to poor mentation   Source if other than patient:  [ ]Family   [x ]Team     Pain:  [ ]yes [ x]no- See PAIN AD score  QOL impact -   Location -                    Aggravating factors -  Quality -  Radiation -  Timing-  Severity (0-10 scale):  Minimal acceptable level (0-10 scale):     Dyspnea:                           [ ]Mild [ ]Moderate [ ]Severe  Anxiety:                             [X ]Mild [ ]Moderate [ ]Severe  Fatigue:                             [ ]Mild [ ]Moderate [X ]Severe  Nausea:                             [ ]Mild [ ]Moderate [ ]Severe  Loss of appetite:              [ ]Mild [ ]Moderate [ ]Severe  Constipation:                    [ ]Mild [ ]Moderate [ ]Severe    PAIN AD Score:	0  [email or web address].[email or web address].[email or web address]/painad.pdf (Ctrl + left click to view)    Other Symptoms:  [ ]All other review of systems negative     Palliative Performance Status Version 2:   10      %      http://npcrc.org/files/news/palliative_performance_scale_ppsv2.pdf    PHYSICAL EXAM:  Vital Signs Last 24 Hrs  T(C): 37.7 (21 Nov 2020 07:49), Max: 37.7 (21 Nov 2020 07:49)  T(F): 99.9 (21 Nov 2020 07:49), Max: 99.9 (21 Nov 2020 07:49)  HR: 83 (21 Nov 2020 07:49) (83 - 83)  BP: 107/67 (21 Nov 2020 07:49) (107/67 - 107/67)  BP(mean): --  RR: 12 (21 Nov 2020 07:49) (12 - 12)  SpO2: 84% (21 Nov 2020 07:49) (84% - 84%)     GENERAL:  [ ]Alert  [ ]Oriented x 0  [ ]Lethargic  [ ]Cachexia  [x ]Unarousable  [ ]Verbal  [X ]Non-Verbal  Behavioral:   [ ]Anxiety  [ ]Delirium [ ]Agitation [ ]Other  HEENT:  [X ]Normal   [ ]Dry mouth   [ ]ET Tube/Trach  [ ]Oral lesions  PULMONARY:   [X ]Clear [ ]Tachypnea  [ ]Audible excessive secretions   [ ]Rhonchi        [ ]Right [ ]Left [ ]Bilateral  [ ]Crackles        [ ]Right [ ]Left [ ]Bilateral  [ ]Wheezing     [ ]Right [ ]Left [ ]Bilateral  [ ]Diminished BS [ ] Right [ ]Left [ ]Bilateral  CARDIOVASCULAR:    [ ]Regular [ ]Irregular [X ]Tachy  [ ]Micah [ ]Murmur [ ]Other  GASTROINTESTINAL:  [ ]Soft  [ ]Distended   [ X]+BS  [ ]Non tender [X ]Tender  [ ]PEG [ ]OGT/ NGT   Last BM: 11/17     GENITOURINARY:  [ ]Normal [x ]Incontinent   [ ]Oliguria/Anuria   [ ][place]  MUSCULOSKELETAL:   [ ]Normal   [ ]Weakness  [x ]Bed/Wheelchair bound [ ]Edema  NEUROLOGIC:   [ ]No focal deficits  [x ] Cognitive impairment  [ ] Dysphagia [ ]Dysarthria [ ] Paresis [ ]Other   SKIN:  +blisters on buttock, please see nursing assessment   [ ]Normal  [ ]Rash   [ ]Pressure ulcer(s) [ ]y [ ]n present on admission [X] Mottled skin    CRITICAL CARE:  [ ]Shock Present  [ ]Septic [ ]Cardiogenic [X ]Neurologic [ ]Hypovolemic  [ ]Vasopressors [ ]Inotropes  [x ]Respiratory failure present [ ]Mechanical Ventilation [ ]Non-invasive ventilatory support [ ]High-Flow  [ ]Acute  [ ]Chronic [ ]Hypoxic  [ ]Hypercarbic [ ]Other  [ ]Other organ failure     LABS: no new imaging    RADIOLOGY & ADDITIONAL STUDIES: no new imaging    Protein Calorie Malnutrition Present: [ ]mild [ ]moderate [x ]severe [ ]underweight [ ]morbid obesity  https://www.andeal.org/vault/2440/web/files/ONC/Table_Clinical%20Characteristics%20to%20Document%20Malnutrition-White%20JV%20et%20al%198573.pdf    Height (cm): 162.5 (11-04-20 @ 07:00)  Weight (kg): 75.2 (11-03-20 @ 21:45)  BMI (kg/m2): 28.5 (11-04-20 @ 07:00)    [x ]PPSV2 < or = 30%  [ ]significant weight loss [ ]poor nutritional intake [ ]anasarca   Albumin, Serum: 3.7 g/dL (11-03-20 @ 21:20)   [ ]Artificial Nutrition    REFERRALS:   [ ]Chaplaincy  [ ]Hospice  [ ]Child Life  [x ]Social Work  [ ]Case management [ ]Holistic Therapy     Goals of Care Document:  ABDIRAHMAN May (11-04-20 @ 15:06)  Goals of Care Conversation:   Participants:  · Participants  Family  · Child(ho)  Terence anaya Frank Estrada    Conversation Discussion:  · Conversation  Diagnosis; MOLST Discussed    What Matters Most To Patient and Family:  · What matters most to patient and family  as per daughter and son, both thinks that patient should not suffer    Personal Advance Directives Treatment Guidelines:   Treatment Guidelines:  · Treatment Guidelines  DNR Order    MOLST:  · Completed  04-Nov-2020  · Updated  04-Nov-2020

## 2020-11-21 NOTE — PROGRESS NOTE ADULT - PROBLEM SELECTOR PLAN 2
[facility]                                   [address] [place] [address]                                                      PHONE: [phone]                                                         FAX: [phone]  [email or web address]  ---------------------------------------------------------------------------------------------------------------------------------    FU for  Pt seen and examined.     Overnight events/patient complaints: denies any cp/sob    No Known Allergies    MEDICATIONS  (STANDING):  ALBUTerol    90 MICROgram(s) HFA Inhaler 2 Puff(s) Inhalation every 6 hours  aspirin enteric coated 81 milliGRAM(s) Oral daily  cyanocobalamin 500 MICROGram(s) Oral daily  diltiazem    Tablet 60 milliGRAM(s) Oral three times a day  diltiazem Injectable 10 milliGRAM(s) IV Push once  docusate sodium 100 milliGRAM(s) Oral three times a day  enoxaparin Injectable 40 milliGRAM(s) SubCutaneous daily  melatonin 3 milliGRAM(s) Oral at bedtime  metoprolol tartrate 50 milliGRAM(s) Oral two times a day  pantoprazole    Tablet 40 milliGRAM(s) Oral before breakfast  potassium chloride    Tablet ER 10 milliEquivalent(s) Oral daily  senna 2 Tablet(s) Oral at bedtime  valsartan 80 milliGRAM(s) Oral daily    MEDICATIONS  (PRN):  acetaminophen  IVPB. 1000 milliGRAM(s) IV Intermittent once PRN break through pain  ALBUTerol    0.083% 2.5 milliGRAM(s) Nebulizer every 6 hours PRN Shortness of Breath and/or Wheezing  ipratropium    for Nebulization 500 MICROGram(s) Nebulizer every 6 hours PRN Shortness of Breath and/or Wheezing  ketorolac   Injectable 30 milliGRAM(s) IV Push every 6 hours PRN BREAK THROUGH PAIN  naloxone Injectable 0.1 milliGRAM(s) IV Push every 3 minutes PRN For ANY of the following changes in patient status:  A. RR LESS THAN 10 breaths per minute, B. Oxygen saturation LESS THAN 90%, C. Sedation score of 6  ondansetron Injectable 4 milliGRAM(s) IV Push every 6 hours PRN Nausea  oxyCODONE    5 mG/acetaminophen 325 mG 1 Tablet(s) Oral every 4 hours PRN Moderate Pain (4 - 6)      Vital Signs Last 24 Hrs  T(C): 36.5 (14 May 2018 05:09), Max: 36.9 (13 May 2018 22:29)  T(F): 97.7 (14 May 2018 05:09), Max: 98.5 (13 May 2018 22:29)  HR: 111 (14 May 2018 05:09) (100 - 130)  BP: 160/75 (14 May 2018 05:09) (138/75 - 160/75)  BP(mean): --  RR: 18 (14 May 2018 05:09) (18 - 20)  SpO2: 95% (14 May 2018 05:09) (94% - 96%)  ICU Vital Signs Last 24 Hrs  I&O's Summary    13 May 2018 07:01  -  14 May 2018 07:00  --------------------------------------------------------  IN: 580 mL / OUT: 630 mL / NET: -50 mL        PHYSICAL EXAM:  HEENT: no JVD  CARDIOVASCULAR: Normal S1 and S2, No murmur, rub, gallop or lift.   LUNGS: decreased air entry at bases  ABDOMEN: Soft, nontender without mass or organomegaly. bowel sounds normoactive.  EXTREMITIES: no edema          LABS:                        13.4   22.8  )-----------( 332      ( 14 May 2018 05:51 )             40.4     05-14    137  |  98  |  21.0<H>  ----------------------------<  85  4.0   |  28.0  |  0.66    Ca    8.4<L>      14 May 2018 05:51  Mg     2.2     05-13    TPro  5.9<L>  /  Alb  3.0<L>  /  TBili  0.7  /  DBili  x   /  AST  11  /  ALT  8   /  AlkPhos  65  05-14    MELISSA BRADFORD  CARDIAC MARKERS ( 13 May 2018 02:00 )  x     / <0.01 ng/mL / 132 U/L / x     / x            Urinalysis Basic - ( 13 May 2018 15:29 )    Color: Melissa / Appearance: Clear / S.025 / pH: x  Gluc: x / Ketone: Negative  / Bili: Negative / Urobili: Negative mg/dL   Blood: x / Protein: 30 mg/dL / Nitrite: Negative   Leuk Esterase: Moderate / RBC: 0-2 /HPF / WBC >50   Sq Epi: x / Non Sq Epi: Occasional / Bacteria: Occasional        RADIOLOGY & ADDITIONAL STUDIES:    LABS:                        13.4   22.8  )-----------( 332      ( 14 May 2018 05:51 )             40.4     05-14    137  |  98  |  21.0<H>  ----------------------------<  85  4.0   |  28.0  |  0.66    Ca    8.4<L>      14 May 2018 05:51  Mg     2.2     05-13    TPro  5.9<L>  /  Alb  3.0<L>  /  TBili  0.7  /  DBili  x   /  AST  11  /  ALT  8   /  AlkPhos  65  05-14    68y  CARDIAC MARKERS ( 13 May 2018 02:00 )  x     / <0.01 ng/mL / 132 U/L / x     / x            Urinalysis Basic - ( 13 May 2018 15:29 )    Color: Melissa / Appearance: Clear / S.025 / pH: x  Gluc: x / Ketone: Negative  / Bili: Negative / Urobili: Negative mg/dL   Blood: x / Protein: 30 mg/dL / Nitrite: Negative   Leuk Esterase: Moderate / RBC: 0-2 /HPF / WBC >50   Sq Epi: x / Non Sq Epi: Occasional / Bacteria: Occasional        Assessment and Plan:  68y Female with prior history of HTN found to have right lung mass. Now s/p VATS (video-assisted thoracoscopic surgery) and Lobectomy of right lung with video-assisted thoracoscopic surgery. Now s/p chest tube removal. Noted to have AF with RVR. Patient upset that she has to stay, wanted to go home.   AF with RVR: will increase cardizem to 90mg q 6 hours. await echo. Needs full AC once approved by CT surgery.   HTn: will increased cardizem.   D/w patient in detail. Patient received Robinul X2 ativan .2 mg, and Dilaudid 1 mg in 24 hours   Robinul .4 mg IV q6 PRN   Given frequent requirements for Dilaudid will place patient on Dilaudid infusion at rate of .5 mg/ hour   1 mg of Dilaudid for breakthrough pain for both dyspnea and pain Patient received Robinul X1 ativan .2 mg, and Dilaudid 1 mg x1 in 24 hours   Continue IVRobinul .4 mg IV q6 PRN   Continue Dilaudid infusion at rate of .5 mg/ hour and Dilaudid 1mg q1h PRN dyspnea and pain Patient received Robinul X1 ativan .2 mg, and Dilaudid 1 mg x1 in 24 hours   Continue IV Robinul 0.4 mg IV q6 PRN   Continue Dilaudid infusion at rate of 0.5 mg/ hour and Dilaudid 1mg q1h PRN dyspnea and pain

## 2020-11-21 NOTE — PROGRESS NOTE ADULT - PROBLEM SELECTOR PLAN 1
Patient s/p extubation on 11.18, with CT initially noting subdural hematoma 11mm, now with midline shift and reduced hematoma to 7 mm  Patient currently AOX0, family in agreement with symptom directed care Patient s/p extubation on 11/18, with CT initially noting subdural hematoma 11mm, now with midline shift and reduced hematoma to 7 mm  Patient currently AOX0, family in agreement with symptom directed care

## 2020-11-22 PROCEDURE — 99232 SBSQ HOSP IP/OBS MODERATE 35: CPT | Mod: GC

## 2020-11-22 RX ADMIN — NYSTATIN CREAM 1 APPLICATION(S): 100000 CREAM TOPICAL at 05:21

## 2020-11-22 RX ADMIN — NYSTATIN CREAM 1 APPLICATION(S): 100000 CREAM TOPICAL at 17:04

## 2020-11-22 RX ADMIN — HYDROMORPHONE HYDROCHLORIDE 1 MILLIGRAM(S): 2 INJECTION INTRAMUSCULAR; INTRAVENOUS; SUBCUTANEOUS at 16:00

## 2020-11-22 RX ADMIN — Medication 650 MILLIGRAM(S): at 15:53

## 2020-11-22 RX ADMIN — ROBINUL 0.4 MILLIGRAM(S): 0.2 INJECTION INTRAMUSCULAR; INTRAVENOUS at 16:00

## 2020-11-22 RX ADMIN — HYDROMORPHONE HYDROCHLORIDE 0.5 MG/HR: 2 INJECTION INTRAMUSCULAR; INTRAVENOUS; SUBCUTANEOUS at 07:10

## 2020-11-22 RX ADMIN — HYDROMORPHONE HYDROCHLORIDE 0.5 MG/HR: 2 INJECTION INTRAMUSCULAR; INTRAVENOUS; SUBCUTANEOUS at 17:04

## 2020-11-22 NOTE — PROGRESS NOTE ADULT - PROBLEM SELECTOR PLAN 1
Patient s/p extubation on 11/18, with CT initially noting subdural hematoma 11mm, now with midline shift and reduced hematoma to 7 mm  Patient currently AOX0, family in agreement with symptom directed care

## 2020-11-22 NOTE — PROGRESS NOTE ADULT - PROBLEM SELECTOR PLAN 2
Patient received Robinul X1 ativan .2 mg, and Dilaudid 1 mg x1 in 24 hours   Continue IV Robinul 0.4 mg IV q6 PRN   Continue Dilaudid infusion at rate of 0.5 mg/ hour and Dilaudid 1mg q1h PRN dyspnea and pain

## 2020-11-22 NOTE — PROGRESS NOTE ADULT - SUBJECTIVE AND OBJECTIVE BOX
GAP TEAM PALLIATIVE CARE UNIT PROGRESS NOTE:      [  ] Patient on hospice program.    INDICATION FOR PALLIATIVE CARE UNIT SERVICES: Sx management after extubation     INTERVAL HPI/OVERNIGHT EVENTS: Patient hypoxic this AM, O2 85% on 2L NC. Appears comfortable, no signs of respiratory distress. No PRN use in last 24 hours. Continues on IV dilaudid infusion 0.5mg/hr. Currently unable to provide ROS given mental status.    DNR on chart: Yes    Allergies    penicillin (Unknown)    Intolerances    MEDICATIONS  (STANDING):  nystatin Powder 1 Application(s) Topical two times a day    MEDICATIONS  (PRN):  acetaminophen  Suppository .. 650 milliGRAM(s) Rectal every 6 hours PRN Temp greater or equal to 38C (100.4F)  bisacodyl Suppository 10 milliGRAM(s) Rectal daily PRN Constipation  glycopyrrolate Injectable 0.4 milliGRAM(s) IV Push every 6 hours PRN Secretions  HYDROmorphone  Injectable 0.5 milliGRAM(s) IV Push every 1 hour PRN dyspnea or RR>27  HYDROmorphone  Injectable 0.5 milliGRAM(s) IV Push every 1 hour PRN pain  LORazepam   Injectable 0.2 milliGRAM(s) IV Push every 1 hour PRN anxiety/agitation/refractory dyspnea      ITEMS UNCHECKED ARE NOT PRESENT    PRESENT SYMPTOMS: [x ]Unable to obtain due to poor mentation   Source if other than patient:  [ ]Family   [x ]Team     Pain:  [ ]yes [ x]no- See PAIN AD score  QOL impact -   Location -                    Aggravating factors -  Quality -  Radiation -  Timing-  Severity (0-10 scale):  Minimal acceptable level (0-10 scale):     Dyspnea:                           [ ]Mild [ ]Moderate [ ]Severe  Anxiety:                             [X ]Mild [ ]Moderate [ ]Severe  Fatigue:                             [ ]Mild [ ]Moderate [X ]Severe  Nausea:                             [ ]Mild [ ]Moderate [ ]Severe  Loss of appetite:              [ ]Mild [ ]Moderate [ ]Severe  Constipation:                    [ ]Mild [ ]Moderate [ ]Severe    PAIN AD Score:	0  [email or web address]/[email or web address].pdf (Ctrl + left click to view)    Other Symptoms:  [ ]All other review of systems negative     Palliative Performance Status Version 2:   10      %      http://npcrc.org/files/news/palliative_performance_scale_ppsv2.pdf    PHYSICAL EXAM:  Vital Signs Last 24 Hrs  T(C): 36.9 (22 Nov 2020 07:24), Max: 36.9 (22 Nov 2020 07:24)  T(F): 98.4 (22 Nov 2020 07:24), Max: 98.4 (22 Nov 2020 07:24)  HR: 103 (22 Nov 2020 07:24) (103 - 103)  BP: 93/57 (22 Nov 2020 07:24) (93/57 - 93/57)  BP(mean): --  RR: 12 (22 Nov 2020 07:24) (12 - 12)  SpO2: 85% (22 Nov 2020 07:24) (85% - 85%)     GENERAL:  [ ]Alert  [ ]Oriented x 0  [ ]Lethargic  [ ]Cachexia  [x ]Unarousable  [ ]Verbal  [X ]Non-Verbal  Behavioral:   [ ]Anxiety  [ ]Delirium [ ]Agitation [ ]Other  HEENT:  [X ]Normal   [ ]Dry mouth   [ ]ET Tube/Trach  [ ]Oral lesions  PULMONARY:   [X ]Clear [ ]Tachypnea  [ ]Audible excessive secretions   [ ]Rhonchi        [ ]Right [ ]Left [ ]Bilateral  [ ]Crackles        [ ]Right [ ]Left [ ]Bilateral  [ ]Wheezing     [ ]Right [ ]Left [ ]Bilateral  [ ]Diminished BS [ ] Right [ ]Left [ ]Bilateral  CARDIOVASCULAR:    [ ]Regular [ ]Irregular [X ]Tachy  [ ]Micah [ ]Murmur [ ]Other  GASTROINTESTINAL:  [x]Soft  [x]  mildly Distended   [ X]+BS  [x]Non tender [ ]Tender  [ ]PEG [ ]OGT/ NGT   Last BM: 11/17     GENITOURINARY:  [ ]Normal [x ]Incontinent   [ ]Oliguria/Anuria   [ ]Carrasco  MUSCULOSKELETAL:   [ ]Normal   [ ]Weakness  [x ]Bed/Wheelchair bound [ ]Edema  NEUROLOGIC:   [ ]No focal deficits  [x ] Cognitive impairment  [ ] Dysphagia [ ]Dysarthria [ ] Paresis [ ]Other   SKIN:  +blisters on buttock, please see nursing assessment   [ ]Normal  [ ]Rash   [ ]Pressure ulcer(s) [ ]y [ ]n present on admission [X] Mottled skin    CRITICAL CARE:  [ ]Shock Present  [ ]Septic [ ]Cardiogenic [X ]Neurologic [ ]Hypovolemic  [ ]Vasopressors [ ]Inotropes  [x ]Respiratory failure present [ ]Mechanical Ventilation [ ]Non-invasive ventilatory support [ ]High-Flow  [ ]Acute  [ ]Chronic [ ]Hypoxic  [ ]Hypercarbic [ ]Other  [ ]Other organ failure     LABS: no new imaging    RADIOLOGY & ADDITIONAL STUDIES: no new imaging    Protein Calorie Malnutrition Present: [ ]mild [ ]moderate [x ]severe [ ]underweight [ ]morbid obesity  https://www.andeal.org/vault/2440/web/files/ONC/Table_Clinical%20Characteristics%20to%20Document%20Malnutrition-White%20JV%20et%20al%040183.pdf    Height (cm): 162.5 (11-04-20 @ 07:00)  Weight (kg): 75.2 (11-03-20 @ 21:45)  BMI (kg/m2): 28.5 (11-04-20 @ 07:00)    [x ]PPSV2 < or = 30%  [ ]significant weight loss [ ]poor nutritional intake [ ]anasarca   Albumin, Serum: 3.7 g/dL (11-03-20 @ 21:20)   [ ]Artificial Nutrition    REFERRALS:   [ ]Chaplaincy  [ ]Hospice  [ ]Child Life  [x ]Social Work  [ ]Case management [ ]Holistic Therapy     Goals of Care Document:  ABDIRAHMAN May (11-04-20 @ 15:06)  Goals of Care Conversation:   Participants:  · Participants  Family  · Child(ho)  Benny Estrada    Conversation Discussion:  · Conversation  Diagnosis; MOLST Discussed    What Matters Most To Patient and Family:  · What matters most to patient and family  as per daughter and son, both thinks that patient should not suffer    Personal Advance Directives Treatment Guidelines:   Treatment Guidelines:  · Treatment Guidelines  DNR Order    MOLST:  · Completed  04-Nov-2020  · Updated  04-Nov-2020

## 2020-11-23 PROCEDURE — 99233 SBSQ HOSP IP/OBS HIGH 50: CPT

## 2020-11-23 RX ADMIN — HYDROMORPHONE HYDROCHLORIDE 1 MILLIGRAM(S): 2 INJECTION INTRAMUSCULAR; INTRAVENOUS; SUBCUTANEOUS at 22:25

## 2020-11-23 RX ADMIN — HYDROMORPHONE HYDROCHLORIDE 1 MILLIGRAM(S): 2 INJECTION INTRAMUSCULAR; INTRAVENOUS; SUBCUTANEOUS at 07:57

## 2020-11-23 RX ADMIN — NYSTATIN CREAM 1 APPLICATION(S): 100000 CREAM TOPICAL at 05:41

## 2020-11-23 RX ADMIN — HYDROMORPHONE HYDROCHLORIDE 1 MILLIGRAM(S): 2 INJECTION INTRAMUSCULAR; INTRAVENOUS; SUBCUTANEOUS at 08:12

## 2020-11-23 RX ADMIN — Medication 0.2 MILLIGRAM(S): at 21:41

## 2020-11-23 RX ADMIN — ROBINUL 0.4 MILLIGRAM(S): 0.2 INJECTION INTRAMUSCULAR; INTRAVENOUS at 08:01

## 2020-11-23 RX ADMIN — HYDROMORPHONE HYDROCHLORIDE 0.5 MG/HR: 2 INJECTION INTRAMUSCULAR; INTRAVENOUS; SUBCUTANEOUS at 19:36

## 2020-11-23 RX ADMIN — SODIUM CHLORIDE 10 MILLILITER(S): 9 INJECTION INTRAMUSCULAR; INTRAVENOUS; SUBCUTANEOUS at 19:37

## 2020-11-23 RX ADMIN — NYSTATIN CREAM 1 APPLICATION(S): 100000 CREAM TOPICAL at 17:42

## 2020-11-23 RX ADMIN — HYDROMORPHONE HYDROCHLORIDE 0.5 MG/HR: 2 INJECTION INTRAMUSCULAR; INTRAVENOUS; SUBCUTANEOUS at 17:42

## 2020-11-23 RX ADMIN — SODIUM CHLORIDE 10 MILLILITER(S): 9 INJECTION INTRAMUSCULAR; INTRAVENOUS; SUBCUTANEOUS at 07:50

## 2020-11-23 RX ADMIN — SODIUM CHLORIDE 10 MILLILITER(S): 9 INJECTION INTRAMUSCULAR; INTRAVENOUS; SUBCUTANEOUS at 07:57

## 2020-11-23 RX ADMIN — HYDROMORPHONE HYDROCHLORIDE 1 MILLIGRAM(S): 2 INJECTION INTRAMUSCULAR; INTRAVENOUS; SUBCUTANEOUS at 12:18

## 2020-11-23 RX ADMIN — HYDROMORPHONE HYDROCHLORIDE 1 MILLIGRAM(S): 2 INJECTION INTRAMUSCULAR; INTRAVENOUS; SUBCUTANEOUS at 13:38

## 2020-11-23 RX ADMIN — HYDROMORPHONE HYDROCHLORIDE 1 MILLIGRAM(S): 2 INJECTION INTRAMUSCULAR; INTRAVENOUS; SUBCUTANEOUS at 22:09

## 2020-11-23 RX ADMIN — HYDROMORPHONE HYDROCHLORIDE 1 MILLIGRAM(S): 2 INJECTION INTRAMUSCULAR; INTRAVENOUS; SUBCUTANEOUS at 13:23

## 2020-11-23 RX ADMIN — HYDROMORPHONE HYDROCHLORIDE 0.5 MG/HR: 2 INJECTION INTRAMUSCULAR; INTRAVENOUS; SUBCUTANEOUS at 07:50

## 2020-11-23 RX ADMIN — HYDROMORPHONE HYDROCHLORIDE 1 MILLIGRAM(S): 2 INJECTION INTRAMUSCULAR; INTRAVENOUS; SUBCUTANEOUS at 12:33

## 2020-11-23 NOTE — PROGRESS NOTE ADULT - ATTENDING COMMENTS
I was physically present for the key portions of the evaluation and management (E/M) service provided.  I agree with the above history, physical, and plan which I have reviewed and edited where appropriate. Plan discussed with team.    ______________  Tyshawn Loving MD   of Geriatric and Palliative Medicine  [facility]     Please page the following number for clinical matters between the hours of 9AM and 5PM   from Monday through Friday : [phone]    After 5PM and on weekends, please page: [phone]. The Geriatric and Palliative Medicine consult service has 24/7 coverage for medical recommendations, including for symptom management needs.

## 2020-11-23 NOTE — PROGRESS NOTE ADULT - PROBLEM SELECTOR PLAN 2
Patient received Robinul X2  and Dilaudid 1 mg x2 in 24 hours   Continue IV Robinul 0.4 mg IV q6 PRN   Continue Dilaudid infusion at rate of 0.5 mg/ hour and Dilaudid 1mg q1h PRN dyspnea and pain

## 2020-11-23 NOTE — CHART NOTE - NSCHARTNOTEFT_GEN_A_CORE
Nutrition Follow Up     As per MD, pt inappropriate for nutrition follow up at this time.     RD remains available.   Genesis Gamez MS RD CDN [facility],  #[phone]

## 2020-11-23 NOTE — PROGRESS NOTE ADULT - SUBJECTIVE AND OBJECTIVE BOX
GAP TEAM PALLIATIVE CARE UNIT PROGRESS NOTE:      [  ] Patient on hospice program.    INDICATION FOR PALLIATIVE CARE UNIT SERVICES: Sx management after extubation     INTERVAL HPI/OVERNIGHT EVENTS: Patient appears comfortable, no signs of respiratory distress. Required IV robinul 0.4mg x2 and dilaudid 1mg x 2 for dyspnea in last 24 hours. Continues on IV dilaudid infusion 0.5mg/hr. Currently unable to provide ROS given mental status.    DNR on chart: Yes    Allergies    penicillin (Unknown)    Intolerances    MEDICATIONS  (STANDING):  nystatin Powder 1 Application(s) Topical two times a day    MEDICATIONS  (PRN):  acetaminophen  Suppository .. 650 milliGRAM(s) Rectal every 6 hours PRN Temp greater or equal to 38C (100.4F)  bisacodyl Suppository 10 milliGRAM(s) Rectal daily PRN Constipation  glycopyrrolate Injectable 0.4 milliGRAM(s) IV Push every 6 hours PRN Secretions  HYDROmorphone  Injectable 0.5 milliGRAM(s) IV Push every 1 hour PRN dyspnea or RR>27  HYDROmorphone  Injectable 0.5 milliGRAM(s) IV Push every 1 hour PRN pain  LORazepam   Injectable 0.2 milliGRAM(s) IV Push every 1 hour PRN anxiety/agitation/refractory dyspnea    ITEMS UNCHECKED ARE NOT PRESENT    PRESENT SYMPTOMS: [x ]Unable to obtain due to poor mentation   Source if other than patient:  [ ]Family   [x ]Team     Pain:  [ ]yes [ x]no- See PAIN AD score  QOL impact -   Location -                    Aggravating factors -  Quality -  Radiation -  Timing-  Severity (0-10 scale):  Minimal acceptable level (0-10 scale):     Dyspnea:                           [ ]Mild [ ]Moderate [ ]Severe  Anxiety:                             [X ]Mild [ ]Moderate [ ]Severe  Fatigue:                             [ ]Mild [ ]Moderate [X ]Severe  Nausea:                             [ ]Mild [ ]Moderate [ ]Severe  Loss of appetite:              [ ]Mild [ ]Moderate [ ]Severe  Constipation:                    [ ]Mild [ ]Moderate [ ]Severe    PAIN AD Score:	0  [email or web address].[email or web address].[email or web address]/cog/painad.pdf (Ctrl + left click to view)    Other Symptoms:  [ ]All other review of systems negative     Palliative Performance Status Version 2:   10      %      http://npcrc.org/files/news/palliative_performance_scale_ppsv2.pdf    PHYSICAL EXAM:  Vital Signs Last 24 Hrs  T(C): 36.7 (23 Nov 2020 08:37), Max: 38.2 (22 Nov 2020 15:52)  T(F): 98.1 (23 Nov 2020 08:37), Max: 100.7 (22 Nov 2020 15:52)  HR: 87 (23 Nov 2020 08:37) (87 - 87)  BP: 89/60 (23 Nov 2020 08:37) (89/60 - 89/60)  BP(mean): --  RR: --  SpO2: 86% (23 Nov 2020 08:37) (86% - 86%)  GENERAL:  [ ]Alert  [ ]Oriented x 0  [ ]Lethargic  [ ]Cachexia  [x ]Unarousable  [ ]Verbal  [X ]Non-Verbal  Behavioral:   [ ]Anxiety  [ ]Delirium [ ]Agitation [ ]Other  HEENT:  [X ]Normal   [ ]Dry mouth   [ ]ET Tube/Trach  [ ]Oral lesions  PULMONARY:   [X ]Clear [ ]Tachypnea  [ ]Audible excessive secretions   [ ]Rhonchi        [ ]Right [ ]Left [ ]Bilateral  [ ]Crackles        [ ]Right [ ]Left [ ]Bilateral  [ ]Wheezing     [ ]Right [ ]Left [ ]Bilateral  [ ]Diminished BS [ ] Right [ ]Left [ ]Bilateral  CARDIOVASCULAR:    [ ]Regular [ ]Irregular [X ]Tachy  [ ]Micah [ ]Murmur [ ]Other  GASTROINTESTINAL:  [x]Soft  [x]  mildly Distended   [ X]+BS  [x]Non tender [ ]Tender  [ ]PEG [ ]OGT/ NGT   Last BM: 11/17     GENITOURINARY:  [ ]Normal [x ]Incontinent   [ ]Oliguria/Anuria   [ ][place]  MUSCULOSKELETAL:   [ ]Normal   [ ]Weakness  [x ]Bed/Wheelchair bound [ ]Edema  NEUROLOGIC:   [ ]No focal deficits  [x ] Cognitive impairment  [ ] Dysphagia [ ]Dysarthria [ ] Paresis [ ]Other   SKIN:  +blisters on buttock, please see nursing assessment   [ ]Normal  [ ]Rash   [ ]Pressure ulcer(s) [ ]y [ ]n present on admission [X] Mottled skin    CRITICAL CARE:  [ ]Shock Present  [ ]Septic [ ]Cardiogenic [X ]Neurologic [ ]Hypovolemic  [ ]Vasopressors [ ]Inotropes  [x ]Respiratory failure present [ ]Mechanical Ventilation [ ]Non-invasive ventilatory support [ ]High-Flow  [ ]Acute  [ ]Chronic [ ]Hypoxic  [ ]Hypercarbic [ ]Other  [ ]Other organ failure     LABS: no new imaging    RADIOLOGY & ADDITIONAL STUDIES: no new imaging    Protein Calorie Malnutrition Present: [ ]mild [ ]moderate [x ]severe [ ]underweight [ ]morbid obesity  https://www.andeal.org/vault/2440/web/files/ONC/Table_Clinical%20Characteristics%20to%20Document%20Malnutrition-White%20JV%20et%20al%790036.pdf    Height (cm): 162.5 (11-04-20 @ 07:00)  Weight (kg): 75.2 (11-03-20 @ 21:45)  BMI (kg/m2): 28.5 (11-04-20 @ 07:00)    [x ]PPSV2 < or = 30%  [ ]significant weight loss [ ]poor nutritional intake [ ]anasarca   Albumin, Serum: 3.7 g/dL (11-03-20 @ 21:20)   [ ]Artificial Nutrition    REFERRALS:   [ ]Chaplaincy  [ ]Hospice  [ ]Child Life  [x ]Social Work  [ ]Case management [ ]Holistic Therapy     Goals of Care Document:  ABDIRAHMAN May (11-04-20 @ 15:06)  Goals of Care Conversation:   Participants:  · Participants  Family  · Child(ho)  TerenceLopez Estrada    Conversation Discussion:  · Conversation  Diagnosis; KOBE Discussed    What Matters Most To Patient and Family:  · What matters most to patient and family  as per daughter and son, both thinks that patient should not suffer    Personal Advance Directives Treatment Guidelines:   Treatment Guidelines:  · Treatment Guidelines  DNR Order    MOLST:  · Completed  04-Nov-2020  · Updated  04-Nov-2020

## 2020-11-24 PROCEDURE — 99233 SBSQ HOSP IP/OBS HIGH 50: CPT

## 2020-11-24 RX ORDER — HYDROMORPHONE HYDROCHLORIDE 2 MG/ML
0.7 INJECTION INTRAMUSCULAR; INTRAVENOUS; SUBCUTANEOUS
Qty: 100 | Refills: 0 | Status: DISCONTINUED | OUTPATIENT
Start: 2020-11-24 | End: 2020-11-26

## 2020-11-24 RX ADMIN — HYDROMORPHONE HYDROCHLORIDE 1 MILLIGRAM(S): 2 INJECTION INTRAMUSCULAR; INTRAVENOUS; SUBCUTANEOUS at 22:52

## 2020-11-24 RX ADMIN — HYDROMORPHONE HYDROCHLORIDE 1 MILLIGRAM(S): 2 INJECTION INTRAMUSCULAR; INTRAVENOUS; SUBCUTANEOUS at 22:22

## 2020-11-24 RX ADMIN — HYDROMORPHONE HYDROCHLORIDE 1 MILLIGRAM(S): 2 INJECTION INTRAMUSCULAR; INTRAVENOUS; SUBCUTANEOUS at 15:05

## 2020-11-24 RX ADMIN — ROBINUL 0.4 MILLIGRAM(S): 0.2 INJECTION INTRAMUSCULAR; INTRAVENOUS at 07:28

## 2020-11-24 RX ADMIN — HYDROMORPHONE HYDROCHLORIDE 0.7 MG/HR: 2 INJECTION INTRAMUSCULAR; INTRAVENOUS; SUBCUTANEOUS at 11:49

## 2020-11-24 RX ADMIN — HYDROMORPHONE HYDROCHLORIDE 1 MILLIGRAM(S): 2 INJECTION INTRAMUSCULAR; INTRAVENOUS; SUBCUTANEOUS at 07:52

## 2020-11-24 RX ADMIN — HYDROMORPHONE HYDROCHLORIDE 0.5 MG/HR: 2 INJECTION INTRAMUSCULAR; INTRAVENOUS; SUBCUTANEOUS at 07:24

## 2020-11-24 RX ADMIN — NYSTATIN CREAM 1 APPLICATION(S): 100000 CREAM TOPICAL at 18:33

## 2020-11-24 RX ADMIN — HYDROMORPHONE HYDROCHLORIDE 1 MILLIGRAM(S): 2 INJECTION INTRAMUSCULAR; INTRAVENOUS; SUBCUTANEOUS at 07:37

## 2020-11-24 RX ADMIN — HYDROMORPHONE HYDROCHLORIDE 1 MILLIGRAM(S): 2 INJECTION INTRAMUSCULAR; INTRAVENOUS; SUBCUTANEOUS at 15:15

## 2020-11-24 RX ADMIN — SODIUM CHLORIDE 10 MILLILITER(S): 9 INJECTION INTRAMUSCULAR; INTRAVENOUS; SUBCUTANEOUS at 07:25

## 2020-11-24 RX ADMIN — HYDROMORPHONE HYDROCHLORIDE 0.7 MG/HR: 2 INJECTION INTRAMUSCULAR; INTRAVENOUS; SUBCUTANEOUS at 13:24

## 2020-11-24 RX ADMIN — NYSTATIN CREAM 1 APPLICATION(S): 100000 CREAM TOPICAL at 05:31

## 2020-11-24 NOTE — PROGRESS NOTE ADULT - SUBJECTIVE AND OBJECTIVE BOX
GAP TEAM PALLIATIVE CARE UNIT PROGRESS NOTE:      [  ] Patient on hospice program.    INDICATION FOR PALLIATIVE CARE UNIT SERVICES: Sx management after extubation     INTERVAL HPI/OVERNIGHT EVENTS: Patient appears comfortable, no signs of respiratory distress. Required IV robinul 0.4mg x2, IV dilaudid 1mg x 4 for dyspnea and IV ativan 0.2 x1 in last 24 hours. Currently unable to provide ROS given mental status.    DNR on chart: Yes    Allergies    penicillin (Unknown)    Intolerances    MEDICATIONS  (STANDING):  nystatin Powder 1 Application(s) Topical two times a day    MEDICATIONS  (PRN):  acetaminophen  Suppository .. 650 milliGRAM(s) Rectal every 6 hours PRN Temp greater or equal to 38C (100.4F)  bisacodyl Suppository 10 milliGRAM(s) Rectal daily PRN Constipation  glycopyrrolate Injectable 0.4 milliGRAM(s) IV Push every 6 hours PRN Secretions  HYDROmorphone  Injectable 0.5 milliGRAM(s) IV Push every 1 hour PRN dyspnea or RR>27  HYDROmorphone  Injectable 0.5 milliGRAM(s) IV Push every 1 hour PRN pain  LORazepam   Injectable 0.2 milliGRAM(s) IV Push every 1 hour PRN anxiety/agitation/refractory dyspnea    ITEMS UNCHECKED ARE NOT PRESENT    PRESENT SYMPTOMS: [x ]Unable to obtain due to poor mentation   Source if other than patient:  [ ]Family   [x ]Team     Pain:  [ ]yes [ x]no- See PAIN AD score  QOL impact -   Location -                    Aggravating factors -  Quality -  Radiation -  Timing-  Severity (0-10 scale):  Minimal acceptable level (0-10 scale):     Dyspnea:                           [ ]Mild [ ]Moderate [ ]Severe  Anxiety:                             [X ]Mild [ ]Moderate [ ]Severe  Fatigue:                             [ ]Mild [ ]Moderate [X ]Severe  Nausea:                             [ ]Mild [ ]Moderate [ ]Severe  Loss of appetite:              [ ]Mild [ ]Moderate [ ]Severe  Constipation:                    [ ]Mild [ ]Moderate [ ]Severe    PAIN AD Score:	0  http://geriatrictoolkit.[place].edu/cog/painad.pdf (Ctrl + left click to view)    Other Symptoms:  [ ]All other review of systems negative     Palliative Performance Status Version 2:   10      %      http://npcrc.org/files/news/palliative_performance_scale_ppsv2.pdf    PHYSICAL EXAM:  Vital Signs Last 24 Hrs  T(C): 36.9 (24 Nov 2020 07:52), Max: 36.9 (24 Nov 2020 07:52)  T(F): 98.4 (24 Nov 2020 07:52), Max: 98.4 (24 Nov 2020 07:52)  HR: 95 (24 Nov 2020 07:52) (95 - 95)  BP: 102/70 (24 Nov 2020 07:52) (102/70 - 102/70)  BP(mean): --  RR: 14 (24 Nov 2020 07:52) (14 - 14)  SpO2: 85% (24 Nov 2020 07:52) (85% - 85%)    GENERAL:  [ ]Alert  []Oriented  [ ]Lethargic  [ ]Cachexia  [x ]Unarousable  [ ]Verbal  [X ]Non-Verbal  Behavioral:   [ ]Anxiety  [ ]Delirium [ ]Agitation [ ]Other  HEENT:  [X ]Normal   [ ]Dry mouth   [ ]ET Tube/Trach  [ ]Oral lesions  PULMONARY:   [X ]Clear [ ]Tachypnea  [ ]Audible excessive secretions   [ ]Rhonchi        [ ]Right [ ]Left [ ]Bilateral  [ ]Crackles        [ ]Right [ ]Left [ ]Bilateral  [ ]Wheezing     [ ]Right [ ]Left [ ]Bilateral  [ ]Diminished BS [ ] Right [ ]Left [ ]Bilateral  CARDIOVASCULAR:    [ ]Regular [ ]Irregular [X ]Tachy  [ ]Micah [ ]Murmur [ ]Other  GASTROINTESTINAL:  [x]Soft  [x]  mildly Distended   [ X]+BS  [x]Non tender [ ]Tender  [ ]PEG [ ]OGT/ NGT   Last BM: 11/17     GENITOURINARY:  [ ]Normal [ ]Incontinent   [ ]Oliguria/Anuria   [x][place]as[place]  MUSCULOSKELETAL:   [ ]Normal   [ ]Weakness  [x ]Bed/Wheelchair bound [ ]Edema  NEUROLOGIC:   [ ]No focal deficits  [x ] Cognitive impairment  [ ] Dysphagia [ ]Dysarthria [ ] Paresis [ ]Other   SKIN:  +blisters on buttock, please see nursing assessment   [ ]Normal  [ ]Rash   [ ]Pressure ulcer(s) [ ]y [ ]n present on admission [X] Mottled skin    CRITICAL CARE:  [ ]Shock Present  [ ]Septic [ ]Cardiogenic [X ]Neurologic [ ]Hypovolemic  [ ]Vasopressors [ ]Inotropes  [x ]Respiratory failure present [ ]Mechanical Ventilation [ ]Non-invasive ventilatory support [ ]High-Flow  [ ]Acute  [ ]Chronic [ ]Hypoxic  [ ]Hypercarbic [ ]Other  [ ]Other organ failure     LABS: no new imaging    RADIOLOGY & ADDITIONAL STUDIES: no new imaging    Protein Calorie Malnutrition Present: [ ]mild [ ]moderate [x ]severe [ ]underweight [ ]morbid obesity  https://www.andeal.org/vault/2440/web/files/ONC/Table_Clinical%20Characteristics%20to%20Document%20Malnutrition-White%20JV%20et%20al%160898.pdf    Height (cm): 162.5 (11-04-20 @ 07:00)  Weight (kg): 75.2 (11-03-20 @ 21:45)  BMI (kg/m2): 28.5 (11-04-20 @ 07:00)    [x ]PPSV2 < or = 30%  [ ]significant weight loss [ ]poor nutritional intake [ ]anasarca   Albumin, Serum: 3.7 g/dL (11-03-20 @ 21:20)   [ ]Artificial Nutrition    REFERRALS:   [ ]Chaplaincy  [ ]Hospice  [ ]Child Life  [x ]Social Work  [ ]Case management [ ]Holistic Therapy     Goals of Care Document:  ABDIRAHMAN May (11-04-20 @ 15:06)  Goals of Care Conversation:   Participants:  · Participants  Family  · Child(ho)  Benny Estrada    Conversation Discussion:  · Conversation  Diagnosis; MOLST Discussed    What Matters Most To Patient and Family:  · What matters most to patient and family  as per daughter and son, both thinks that patient should not suffer    Personal Advance Directives Treatment Guidelines:   Treatment Guidelines:  · Treatment Guidelines  DNR Order    MOLST:  · Completed  04-Nov-2020  · Updated  04-Nov-2020

## 2020-11-24 NOTE — PROGRESS NOTE ADULT - PROBLEM SELECTOR PLAN 1
Patient s/p extubation on 11/18, with CT initially noting subdural hematoma 11mm, now with midline shift and reduced hematoma to 7 mm  Patient unarousable, family in agreement with symptom directed care

## 2020-11-24 NOTE — PROGRESS NOTE ADULT - ATTENDING COMMENTS
I was physically present for the key portions of the evaluation and management (E/M) service provided.  I agree with the above history, physical, and plan which I have reviewed and edited where appropriate. Plan discussed with team.    ______________  Tyshawn Loving MD   of Geriatric and Palliative Medicine  [facility]     Please page the following number for clinical matters between the hours of 9AM and 5PM   from Monday through Friday : [phone]    After 5PM and on weekends, please page: [phone]. The Geriatric and Palliative Medicine consult service has 24/7 coverage for medical recommendations, including for symptom management needs.

## 2020-11-24 NOTE — PROGRESS NOTE ADULT - PROBLEM SELECTOR PLAN 2
Patient received Robinul X2  and Dilaudid 1 mg x4 in 24 hours   Continue IV Robinul 0.4 mg IV q6 PRN   Increase Dilaudid infusion at rate of 0.7 mg/ hour and continue Dilaudid 1mg q1h PRN dyspnea and pain

## 2020-11-25 PROCEDURE — 99233 SBSQ HOSP IP/OBS HIGH 50: CPT

## 2020-11-25 RX ORDER — HYDROMORPHONE HYDROCHLORIDE 2 MG/ML
1.5 INJECTION INTRAMUSCULAR; INTRAVENOUS; SUBCUTANEOUS
Refills: 0 | Status: DISCONTINUED | OUTPATIENT
Start: 2020-11-25 | End: 2020-11-26

## 2020-11-25 RX ADMIN — HYDROMORPHONE HYDROCHLORIDE 1 MILLIGRAM(S): 2 INJECTION INTRAMUSCULAR; INTRAVENOUS; SUBCUTANEOUS at 07:33

## 2020-11-25 RX ADMIN — HYDROMORPHONE HYDROCHLORIDE 1 MILLIGRAM(S): 2 INJECTION INTRAMUSCULAR; INTRAVENOUS; SUBCUTANEOUS at 04:02

## 2020-11-25 RX ADMIN — NYSTATIN CREAM 1 APPLICATION(S): 100000 CREAM TOPICAL at 18:34

## 2020-11-25 RX ADMIN — HYDROMORPHONE HYDROCHLORIDE 1.5 MILLIGRAM(S): 2 INJECTION INTRAMUSCULAR; INTRAVENOUS; SUBCUTANEOUS at 12:15

## 2020-11-25 RX ADMIN — HYDROMORPHONE HYDROCHLORIDE 0.7 MG/HR: 2 INJECTION INTRAMUSCULAR; INTRAVENOUS; SUBCUTANEOUS at 07:28

## 2020-11-25 RX ADMIN — SODIUM CHLORIDE 10 MILLILITER(S): 9 INJECTION INTRAMUSCULAR; INTRAVENOUS; SUBCUTANEOUS at 07:29

## 2020-11-25 RX ADMIN — HYDROMORPHONE HYDROCHLORIDE 1.5 MILLIGRAM(S): 2 INJECTION INTRAMUSCULAR; INTRAVENOUS; SUBCUTANEOUS at 12:00

## 2020-11-25 RX ADMIN — ROBINUL 0.4 MILLIGRAM(S): 0.2 INJECTION INTRAMUSCULAR; INTRAVENOUS at 04:03

## 2020-11-25 RX ADMIN — NYSTATIN CREAM 1 APPLICATION(S): 100000 CREAM TOPICAL at 05:16

## 2020-11-25 RX ADMIN — HYDROMORPHONE HYDROCHLORIDE 1 MILLIGRAM(S): 2 INJECTION INTRAMUSCULAR; INTRAVENOUS; SUBCUTANEOUS at 07:48

## 2020-11-25 RX ADMIN — HYDROMORPHONE HYDROCHLORIDE 0.7 MG/HR: 2 INJECTION INTRAMUSCULAR; INTRAVENOUS; SUBCUTANEOUS at 20:25

## 2020-11-25 NOTE — PROGRESS NOTE ADULT - ASSESSMENT
81 yo F with hx of dementia, hydrocephalus s/p  shunt, recent hip fracture presents from SNF with AMS intubated with findings of large SHD with midline shift and L 8-10 rib fractures. Patient with midline shift and decreased subdural hematoma 11mm-> 7mm, now s/p extubation. Remains in PCU for symptom management.

## 2020-11-25 NOTE — PROGRESS NOTE ADULT - PROBLEM SELECTOR PLAN 2
Patient received Robinul X2  and Dilaudid 1 mg x4 in 24 hours   Continue IV Robinul 0.4 mg IV q6 PRN   Continue dilaudid infusion at rate of 0.7 mg/ hour and increased Dilaudid to 1.5mg q1h PRN dyspnea and pain Patient received Robinul X2  and Dilaudid 1 mg x4 in 24 hours   Continue IV Robinul 0.4 mg IV q6 PRN   Continue dilaudid infusion at rate of 0.7 mg/ hour and increased Dilaudid to 1.5mg; q1h PRN dyspnea and pain; will increase gtt if requiring frequent PRNs at new dose

## 2020-11-25 NOTE — PROGRESS NOTE ADULT - SUBJECTIVE AND OBJECTIVE BOX
GAP TEAM PALLIATIVE CARE UNIT PROGRESS NOTE:      [  ] Patient on hospice program.    INDICATION FOR PALLIATIVE CARE UNIT SERVICES: Sx management after extubation     INTERVAL HPI/OVERNIGHT EVENTS: Patient appears comfortable, no signs of acute respiratory distress. Unarousable. Required IV robinul 0.4mg x2, IV dilaudid 1mg x 4 for dyspnea and x1 for pain  in last 24 hours.   DNR on chart: Yes    Allergies    penicillin (Unknown)    Intolerances    MEDICATIONS  (STANDING):  nystatin Powder 1 Application(s) Topical two times a day    MEDICATIONS  (PRN):  acetaminophen  Suppository .. 650 milliGRAM(s) Rectal every 6 hours PRN Temp greater or equal to 38C (100.4F)  bisacodyl Suppository 10 milliGRAM(s) Rectal daily PRN Constipation  glycopyrrolate Injectable 0.4 milliGRAM(s) IV Push every 6 hours PRN Secretions  HYDROmorphone  Injectable 0.5 milliGRAM(s) IV Push every 1 hour PRN dyspnea or RR>27  HYDROmorphone  Injectable 0.5 milliGRAM(s) IV Push every 1 hour PRN pain  LORazepam   Injectable 0.2 milliGRAM(s) IV Push every 1 hour PRN anxiety/agitation/refractory dyspnea    ITEMS UNCHECKED ARE NOT PRESENT    PRESENT SYMPTOMS: [x ]Unable to obtain due to poor mentation   Source if other than patient:  [ ]Family   [x ]Team     Pain:  [ ]yes [ x]no- See PAIN AD score  QOL impact -   Location -                    Aggravating factors -  Quality -  Radiation -  Timing-  Severity (0-10 scale):  Minimal acceptable level (0-10 scale):     Dyspnea:                           [ ]Mild [ ]Moderate [ ]Severe  Anxiety:                             [X ]Mild [ ]Moderate [ ]Severe  Fatigue:                             [ ]Mild [ ]Moderate [X ]Severe  Nausea:                             [ ]Mild [ ]Moderate [ ]Severe  Loss of appetite:              [ ]Mild [ ]Moderate [ ]Severe  Constipation:                    [ ]Mild [ ]Moderate [ ]Severe    PAIN AD Score:	0  http://geriatrictoolkit.[place].edu/cog/painad.pdf (Ctrl + left click to view)    Other Symptoms:  [ ]All other review of systems negative     Palliative Performance Status Version 2:   10      %      http://npcrc.org/files/news/palliative_performance_scale_ppsv2.pdf    PHYSICAL EXAM:  Vital Signs Last 24 Hrs  T(C): 36.1 (25 Nov 2020 07:48), Max: 36.1 (25 Nov 2020 07:48)  T(F): 97 (25 Nov 2020 07:48), Max: 97 (25 Nov 2020 07:48)  HR: 91 (25 Nov 2020 07:48) (91 - 91)  BP: 94/53 (25 Nov 2020 07:48) (94/53 - 94/53)  BP(mean): --  RR: 12 (25 Nov 2020 07:48) (12 - 12)  SpO2: 78% (25 Nov 2020 07:48) (78% - 78%)    GENERAL:  [ ]Alert  []Oriented  [ ]Lethargic  [ ]Cachexia  [x ]Unarousable  [ ]Verbal  [X ]Non-Verbal  Behavioral:   [ ]Anxiety  [ ]Delirium [ ]Agitation [ ]Other  HEENT:  [X ]Normal   [ ]Dry mouth   [ ]ET Tube/Trach  [ ]Oral lesions  PULMONARY:   [X ]Clear [ ]Tachypnea  [ ]Audible excessive secretions   [ ]Rhonchi        [ ]Right [ ]Left [ ]Bilateral  [ ]Crackles        [ ]Right [ ]Left [ ]Bilateral  [ ]Wheezing     [ ]Right [ ]Left [ ]Bilateral  [ ]Diminished BS [ ] Right [ ]Left [ ]Bilateral  CARDIOVASCULAR:    [ ]Regular [ ]Irregular [X ]Tachy  [ ]Micah [ ]Murmur [ ]Other  GASTROINTESTINAL:  [x]Soft  [x]  mildly Distended   [ X]+BS  [x]Non tender [ ]Tender  [ ]PEG [ ]OGT/ NGT   Last BM: 11/17     GENITOURINARY:  [ ]Normal [ ]Incontinent   [ ]Oliguria/Anuria   [x]Carrasco  MUSCULOSKELETAL:   [ ]Normal   [ ]Weakness  [x ]Bed/Wheelchair bound [ ]Edema  NEUROLOGIC:   [ ]No focal deficits  [x ] Cognitive impairment  [ ] Dysphagia [ ]Dysarthria [ ] Paresis [ ]Other   SKIN:  +blisters on buttock, please see nursing assessment   [ ]Normal  [ ]Rash   [ ]Pressure ulcer(s) [ ]y [ ]n present on admission [X] Mottled skin    CRITICAL CARE:  [ ]Shock Present  [ ]Septic [ ]Cardiogenic [X ]Neurologic [ ]Hypovolemic  [ ]Vasopressors [ ]Inotropes  [x ]Respiratory failure present [ ]Mechanical Ventilation [ ]Non-invasive ventilatory support [ ]High-Flow  [ ]Acute  [ ]Chronic [ ]Hypoxic  [ ]Hypercarbic [ ]Other  [ ]Other organ failure     LABS: no new imaging    RADIOLOGY & ADDITIONAL STUDIES: no new imaging    Protein Calorie Malnutrition Present: [ ]mild [ ]moderate [x ]severe [ ]underweight [ ]morbid obesity  https://www.andeal.org/vault/2440/web/files/ONC/Table_Clinical%20Characteristics%20to%20Document%20Malnutrition-White%20JV%20et%20al%955966.pdf    Height (cm): 162.5 (11-04-20 @ 07:00)  Weight (kg): 75.2 (11-03-20 @ 21:45)  BMI (kg/m2): 28.5 (11-04-20 @ 07:00)    [x ]PPSV2 < or = 30%  [ ]significant weight loss [ ]poor nutritional intake [ ]anasarca   Albumin, Serum: 3.7 g/dL (11-03-20 @ 21:20)   [ ]Artificial Nutrition    REFERRALS:   [ ]Chaplaincy  [ ]Hospice  [ ]Child Life  [x ]Social Work  [ ]Case management [ ]Holistic Therapy     Goals of Care Document:  ABDIRAHMAN May (11-04-20 @ 15:06)  Goals of Care Conversation:   Participants:  · Participants  Family  · Child(ho)  Benny Estrada    Conversation Discussion:  · Conversation  Diagnosis; MOLST Discussed    What Matters Most To Patient and Family:  · What matters most to patient and family  as per daughter and son, both thinks that patient should not suffer    Personal Advance Directives Treatment Guidelines:   Treatment Guidelines:  · Treatment Guidelines  DNR Order    MOLST:  · Completed  04-Nov-2020  · Updated  04-Nov-2020

## 2020-11-26 VITALS
OXYGEN SATURATION: 67 % | DIASTOLIC BLOOD PRESSURE: 67 MMHG | SYSTOLIC BLOOD PRESSURE: 108 MMHG | HEART RATE: 94 BPM | TEMPERATURE: 98 F | RESPIRATION RATE: 16 BRPM

## 2020-11-26 PROCEDURE — 99233 SBSQ HOSP IP/OBS HIGH 50: CPT | Mod: GC

## 2020-11-26 RX ADMIN — NYSTATIN CREAM 1 APPLICATION(S): 100000 CREAM TOPICAL at 17:44

## 2020-11-26 RX ADMIN — HYDROMORPHONE HYDROCHLORIDE 1.5 MILLIGRAM(S): 2 INJECTION INTRAMUSCULAR; INTRAVENOUS; SUBCUTANEOUS at 10:53

## 2020-11-26 RX ADMIN — ROBINUL 0.4 MILLIGRAM(S): 0.2 INJECTION INTRAMUSCULAR; INTRAVENOUS at 18:11

## 2020-11-26 RX ADMIN — HYDROMORPHONE HYDROCHLORIDE 1.5 MILLIGRAM(S): 2 INJECTION INTRAMUSCULAR; INTRAVENOUS; SUBCUTANEOUS at 16:04

## 2020-11-26 RX ADMIN — HYDROMORPHONE HYDROCHLORIDE 0.7 MG/HR: 2 INJECTION INTRAMUSCULAR; INTRAVENOUS; SUBCUTANEOUS at 18:08

## 2020-11-26 RX ADMIN — HYDROMORPHONE HYDROCHLORIDE 1.5 MILLIGRAM(S): 2 INJECTION INTRAMUSCULAR; INTRAVENOUS; SUBCUTANEOUS at 09:40

## 2020-11-26 RX ADMIN — NYSTATIN CREAM 1 APPLICATION(S): 100000 CREAM TOPICAL at 05:25

## 2020-11-26 RX ADMIN — HYDROMORPHONE HYDROCHLORIDE 1.5 MILLIGRAM(S): 2 INJECTION INTRAMUSCULAR; INTRAVENOUS; SUBCUTANEOUS at 18:11

## 2020-11-26 RX ADMIN — ROBINUL 0.4 MILLIGRAM(S): 0.2 INJECTION INTRAMUSCULAR; INTRAVENOUS at 10:53

## 2020-11-26 NOTE — PROGRESS NOTE ADULT - PROBLEM SELECTOR PLAN 2
Patient received Robinul X1  and Dilaudid 1.5 mg x1 in 24 hours   Continue IV Robinul 0.4 mg IV q6 PRN   Continue dilaudid infusion at rate of 0.7 mg/ hour and Dilaudid 1.5mg q1h PRN dyspnea and pain; will increase gtt if requiring frequent PRNs at new dose Patient received Robinul X1  and Dilaudid 1.5 mg x1 in 24 hours   Continue IV Robinul 0.4 mg IV q6 PRN   Continue Dilaudid infusion at rate of 0.7 mg/ hour and Dilaudid 1.5mg q1h PRN dyspnea and pain; will increase gtt if requiring frequent PRNs at new dose

## 2020-11-26 NOTE — PROGRESS NOTE ADULT - SUBJECTIVE AND OBJECTIVE BOX
GAP TEAM PALLIATIVE CARE UNIT PROGRESS NOTE:      [  ] Patient on hospice program.    INDICATION FOR PALLIATIVE CARE UNIT SERVICES: Sx management after extubation     INTERVAL HPI/OVERNIGHT EVENTS: Patient appears comfortable, no signs of acute respiratory distress. Unarousable. Required IV robinul 0.4mg x1, IV dilaudid 1.5mg x1 in last 24 hours. Remains on dilaudid infusion 0.7 mg/hr    DNR on chart: Yes    Allergies    penicillin (Unknown)    Intolerances    MEDICATIONS  (STANDING):  nystatin Powder 1 Application(s) Topical two times a day    MEDICATIONS  (PRN):  acetaminophen  Suppository .. 650 milliGRAM(s) Rectal every 6 hours PRN Temp greater or equal to 38C (100.4F)  bisacodyl Suppository 10 milliGRAM(s) Rectal daily PRN Constipation  glycopyrrolate Injectable 0.4 milliGRAM(s) IV Push every 6 hours PRN Secretions  HYDROmorphone  Injectable 0.5 milliGRAM(s) IV Push every 1 hour PRN dyspnea or RR>27  HYDROmorphone  Injectable 0.5 milliGRAM(s) IV Push every 1 hour PRN pain  LORazepam   Injectable 0.2 milliGRAM(s) IV Push every 1 hour PRN anxiety/agitation/refractory dyspnea    ITEMS UNCHECKED ARE NOT PRESENT    PRESENT SYMPTOMS: [x ]Unable to obtain due to poor mentation   Source if other than patient:  [ ]Family   [x ]Team     Pain:  [ ]yes [ x]no- See PAIN AD score  QOL impact -   Location -                    Aggravating factors -  Quality -  Radiation -  Timing-  Severity (0-10 scale):  Minimal acceptable level (0-10 scale):     Dyspnea:                           [ ]Mild [ ]Moderate [x]Severe  Anxiety:                             [X ]Mild [ ]Moderate [ ]Severe  Fatigue:                             [ ]Mild [ ]Moderate [X ]Severe  Nausea:                             [ ]Mild [ ]Moderate [ ]Severe  Loss of appetite:              [ ]Mild [ ]Moderate [ ]Severe  Constipation:                    [ ]Mild [ ]Moderate [ ]Severe    PAIN AD Score:	0  http://geriatrictoolkit.[employer].edu/cog/painad.pdf (Ctrl + left click to view)    Other Symptoms:  [ ]All other review of systems negative     Palliative Performance Status Version 2:   10      %      http://npcrc.org/files/news/palliative_performance_scale_ppsv2.pdf    PHYSICAL EXAM:  Vital Signs Last 24 Hrs  T(C): 36.6 (26 Nov 2020 08:39), Max: 36.6 (26 Nov 2020 08:39)  T(F): 97.9 (26 Nov 2020 08:39), Max: 97.9 (26 Nov 2020 08:39)  HR: 94 (26 Nov 2020 08:39) (94 - 94)  BP: 108/67 (26 Nov 2020 08:39) (108/67 - 108/67)  BP(mean): --  RR: 16 (26 Nov 2020 08:39) (16 - 16)  SpO2: 67% (26 Nov 2020 08:39) (67% - 67%)    GENERAL:  [ ]Alert  []Oriented  [ ]Lethargic  [ ]Cachexia  [x ]Unarousable  [ ]Verbal  [X ]Non-Verbal  Behavioral:   [ ]Anxiety  [ ]Delirium [ ]Agitation [ ]Other  HEENT:  [X ]Normal   [ ]Dry mouth   [ ]ET Tube/Trach  [ ]Oral lesions  PULMONARY:   [X ]Clear [ ]Tachypnea  [ ]Audible excessive secretions   [ ]Rhonchi        [ ]Right [ ]Left [ ]Bilateral  [ ]Crackles        [ ]Right [ ]Left [ ]Bilateral  [ ]Wheezing     [ ]Right [ ]Left [ ]Bilateral  [ ]Diminished BS [ ] Right [ ]Left [ ]Bilateral  CARDIOVASCULAR:    [ ]Regular [ ]Irregular [X ]Tachy  [ ][place] [ ]Murmur [ ]Other  GASTROINTESTINAL:  [x]Soft  [x]  mildly Distended   [ X]+BS  [x]Non tender [ ]Tender  [ ]PEG [ ]OGT/ NGT   Last BM: 11/17     GENITOURINARY:  [ ]Normal [ ]Incontinent   [ ]Oliguria/Anuria   [x][place]  MUSCULOSKELETAL:   [ ]Normal   [ ]Weakness  [x ]Bed/Wheelchair bound [ ]Edema  NEUROLOGIC:   [ ]No focal deficits  [x ] Cognitive impairment  [ ] Dysphagia [ ]Dysarthria [ ] Paresis [ ]Other   SKIN:  +blisters on buttock, please see nursing assessment   [ ]Normal  [ ]Rash   [ ]Pressure ulcer(s) [ ]y [ ]n present on admission [X] Mottled skin    CRITICAL CARE:  [ ]Shock Present  [ ]Septic [ ]Cardiogenic [X ]Neurologic [ ]Hypovolemic  [ ]Vasopressors [ ]Inotropes  [x ]Respiratory failure present [ ]Mechanical Ventilation [ ]Non-invasive ventilatory support [ ]High-Flow  [ ]Acute  [ ]Chronic [ ]Hypoxic  [ ]Hypercarbic [ ]Other  [ ]Other organ failure     LABS: no new imaging    RADIOLOGY & ADDITIONAL STUDIES: no new imaging    Protein Calorie Malnutrition Present: [ ]mild [ ]moderate [x ]severe [ ]underweight [ ]morbid obesity  https://www.andeal.org/vault/2440/web/files/ONC/Table_Clinical%20Characteristics%20to%20Document%20Malnutrition-White%20JV%20et%20al%310601.pdf    Height (cm): 162.5 (11-04-20 @ 07:00)  Weight (kg): 75.2 (11-03-20 @ 21:45)  BMI (kg/m2): 28.5 (11-04-20 @ 07:00)    [x ]PPSV2 < or = 30%  [ ]significant weight loss [ ]poor nutritional intake [ ]anasarca   Albumin, Serum: 3.7 g/dL (11-03-20 @ 21:20)   [ ]Artificial Nutrition    REFERRALS:   [ ]Chaplaincy  [ ]Hospice  [ ]Child Life  [x ]Social Work  [ ]Case management [ ]Holistic Therapy     Goals of Care Document:  ABDIRAHMAN May (11-04-20 @ 15:06)  Goals of Care Conversation:   Participants:  · Participants  Family  · Child(ho)  Benny Estrada    Conversation Discussion:  · Conversation  Diagnosis; MOLST Discussed    What Matters Most To Patient and Family:  · What matters most to patient and family  as per daughter and son, both thinks that patient should not suffer    Personal Advance Directives Treatment Guidelines:   Treatment Guidelines:  · Treatment Guidelines  DNR Order    MOLST:  · Completed  04-Nov-2020  · Updated  04-Nov-2020

## 2020-11-26 NOTE — PROGRESS NOTE ADULT - PROBLEM SELECTOR PROBLEM 1
Traumatic subdural hematoma

## 2020-11-26 NOTE — PROGRESS NOTE ADULT - REASON FOR ADMISSION
s/p fall

## 2020-11-26 NOTE — PROGRESS NOTE ADULT - PROVIDER SPECIALTY LIST ADULT
Neurology
Neurosurgery
Palliative Care
SICU
Surgery
Trauma Surgery
Surgery

## 2020-11-26 NOTE — CHART NOTE - NSCHARTNOTEFT_GEN_A_CORE
Informed by RN, patient  at 21:40, pronounced by Kerry Currie RN  Patient's son, Terence Estrada was contacted and made aware by RN.  Patient is an organ donor, but not candidate for organ donation.  Spoke with medical examiner, Stephanie Shi, who accepted patient as a ME case.      -Christen Gaytan PA-C, [id number], Dept of Medicine

## 2020-11-26 NOTE — PROGRESS NOTE ADULT - ASSESSMENT
79 yo F with hx of dementia, hydrocephalus s/p  shunt, recent hip fracture presents from SNF with AMS intubated with findings of large SHD with midline shift and L 8-10 rib fractures. Patient with midline shift and decreased subdural hematoma 11mm-> 7mm, now s/p extubation. Remains in PCU for symptom management.

## 2020-11-26 NOTE — PROGRESS NOTE ADULT - ATTENDING COMMENTS
Pt seen with fellow  Agree with above plan  Pt actively dying, main goal is comfort  Symptoms controlled with current regimen, will continue

## 2020-11-26 NOTE — DISCHARGE NOTE FOR THE EXPIRED PATIENT - HOSPITAL COURSE
81 yo F with hx of dementia, hydrocephalus s/p  shunt, recent hip fracture presents from SNF with AMS intubated with findings of large SHD with midline shift and L 8-10 rib fractures. Patient with midline shift and decreased subdural hematoma 11mm-> 7mm. Patient compassionately extubated on . Transferred to PCU for symptom management and end of life care. Pt  on . Case referred to medical examiner.

## 2020-12-01 RX ORDER — ACETAMINOPHEN 500 MG
2 TABLET ORAL
Qty: 0 | Refills: 0 | DISCHARGE

## 2020-12-28 PROCEDURE — 80061 LIPID PANEL: CPT

## 2020-12-28 PROCEDURE — 85027 COMPLETE CBC AUTOMATED: CPT

## 2020-12-28 PROCEDURE — 85396 CLOTTING ASSAY WHOLE BLOOD: CPT

## 2020-12-28 PROCEDURE — 95700 EEG CONT REC W/VID EEG TECH: CPT

## 2020-12-28 PROCEDURE — 85610 PROTHROMBIN TIME: CPT

## 2020-12-28 PROCEDURE — 84132 ASSAY OF SERUM POTASSIUM: CPT

## 2020-12-28 PROCEDURE — 83735 ASSAY OF MAGNESIUM: CPT

## 2020-12-28 PROCEDURE — 95715 VEEG EA 12-26HR INTMT MNTR: CPT

## 2020-12-28 PROCEDURE — 87086 URINE CULTURE/COLONY COUNT: CPT

## 2020-12-28 PROCEDURE — 93970 EXTREMITY STUDY: CPT

## 2020-12-28 PROCEDURE — 86870 RBC ANTIBODY IDENTIFICATION: CPT

## 2020-12-28 PROCEDURE — 94003 VENT MGMT INPAT SUBQ DAY: CPT

## 2020-12-28 PROCEDURE — 80048 BASIC METABOLIC PNL TOTAL CA: CPT

## 2020-12-28 PROCEDURE — 72125 CT NECK SPINE W/O DYE: CPT

## 2020-12-28 PROCEDURE — 72170 X-RAY EXAM OF PELVIS: CPT

## 2020-12-28 PROCEDURE — 31500 INSERT EMERGENCY AIRWAY: CPT

## 2020-12-28 PROCEDURE — 85730 THROMBOPLASTIN TIME PARTIAL: CPT

## 2020-12-28 PROCEDURE — 81001 URINALYSIS AUTO W/SCOPE: CPT

## 2020-12-28 PROCEDURE — 84295 ASSAY OF SERUM SODIUM: CPT

## 2020-12-28 PROCEDURE — 80307 DRUG TEST PRSMV CHEM ANLYZR: CPT

## 2020-12-28 PROCEDURE — 85018 HEMOGLOBIN: CPT

## 2020-12-28 PROCEDURE — C8929: CPT

## 2020-12-28 PROCEDURE — 86905 BLOOD TYPING RBC ANTIGENS: CPT

## 2020-12-28 PROCEDURE — 82435 ASSAY OF BLOOD CHLORIDE: CPT

## 2020-12-28 PROCEDURE — 86880 COOMBS TEST DIRECT: CPT

## 2020-12-28 PROCEDURE — 86769 SARS-COV-2 COVID-19 ANTIBODY: CPT

## 2020-12-28 PROCEDURE — 83690 ASSAY OF LIPASE: CPT

## 2020-12-28 PROCEDURE — 95714 VEEG EA 12-26 HR UNMNTR: CPT

## 2020-12-28 PROCEDURE — 74177 CT ABD & PELVIS W/CONTRAST: CPT

## 2020-12-28 PROCEDURE — 82803 BLOOD GASES ANY COMBINATION: CPT

## 2020-12-28 PROCEDURE — 87186 SC STD MICRODIL/AGAR DIL: CPT

## 2020-12-28 PROCEDURE — 87040 BLOOD CULTURE FOR BACTERIA: CPT

## 2020-12-28 PROCEDURE — 82330 ASSAY OF CALCIUM: CPT

## 2020-12-28 PROCEDURE — 85025 COMPLETE CBC W/AUTO DIFF WBC: CPT

## 2020-12-28 PROCEDURE — U0003: CPT

## 2020-12-28 PROCEDURE — 86900 BLOOD TYPING SEROLOGIC ABO: CPT

## 2020-12-28 PROCEDURE — 86901 BLOOD TYPING SEROLOGIC RH(D): CPT

## 2020-12-28 PROCEDURE — 83605 ASSAY OF LACTIC ACID: CPT

## 2020-12-28 PROCEDURE — 86922 COMPATIBILITY TEST ANTIGLOB: CPT

## 2020-12-28 PROCEDURE — 80053 COMPREHEN METABOLIC PANEL: CPT

## 2020-12-28 PROCEDURE — 86850 RBC ANTIBODY SCREEN: CPT

## 2020-12-28 PROCEDURE — 85014 HEMATOCRIT: CPT

## 2020-12-28 PROCEDURE — 83036 HEMOGLOBIN GLYCOSYLATED A1C: CPT

## 2020-12-28 PROCEDURE — 82947 ASSAY GLUCOSE BLOOD QUANT: CPT

## 2020-12-28 PROCEDURE — 71045 X-RAY EXAM CHEST 1 VIEW: CPT

## 2020-12-28 PROCEDURE — 94002 VENT MGMT INPAT INIT DAY: CPT

## 2020-12-28 PROCEDURE — 84100 ASSAY OF PHOSPHORUS: CPT

## 2020-12-28 PROCEDURE — 84484 ASSAY OF TROPONIN QUANT: CPT

## 2020-12-28 PROCEDURE — 94770: CPT

## 2020-12-28 PROCEDURE — 99291 CRITICAL CARE FIRST HOUR: CPT | Mod: 25

## 2020-12-28 PROCEDURE — 70450 CT HEAD/BRAIN W/O DYE: CPT

## 2021-08-10 NOTE — PROGRESS NOTE ADULT - SUBJECTIVE AND OBJECTIVE BOX
SURGERY DAILY PROGRESS NOTE:     24 HOUR SICU EVENTS:  -CT head  increased size of ventricles and hemorrhage layering in occipital horns, similar midline shift and subdural hemorrhage  -q4h neurochecks  -placed on CPAP 10/5 overnight for increased work of breathing on 0/5  -had previously tolerated 0/5 during the day  -passed TOV  -added miralax/senna  -lowered hold parameters for metoprolol PO and amlodipine PO  -son called during the day requesting to speak with Dr. Johnson, neurosurgery team aware    SUBJECTIVE/ROS: Patient seen and evaluated on AM rounds. Patient remains intubated.          OBJECTIVE:    Vital Signs Last 24 Hrs  T(C): 36.8 (10 Nov 2020 07:00), Max: 37.3 (2020 11:00)  T(F): 98.2 (10 Nov 2020 07:00), Max: 99.1 (2020 11:00)  HR: 85 (10 Nov 2020 07:00) (71 - 103)  BP: 157/76 (10 Nov 2020 07:00) (147/87 - 189/88)  BP(mean): 109 (10 Nov 2020 07:00) (102 - 141)  RR: 22 (10 Nov 2020 07:00) (20 - 41)  SpO2: 97% (10 Nov 2020 07:00) (97% - 100%)  I&O's Detail    2020 07:01  -  10 Nov 2020 07:00  --------------------------------------------------------  IN:    IV PiggyBack: 125 mL    Pivot 1.5: 1320 mL  Total IN: 1445 mL    OUT:    Incontinent per Collection Bag (mL): 1300 mL    Indwelling Catheter - Urethral (mL): 325 mL  Total OUT: 1625 mL    Total NET: -180 mL        Daily     Daily Weight in k.2 (10 Nov 2020 00:37)  MEDICATIONS  (STANDING):  acetaminophen    Suspension .. 650 milliGRAM(s) Oral every 6 hours  amLODIPine   Tablet 10 milliGRAM(s) Oral daily  atorvastatin 40 milliGRAM(s) Oral at bedtime  chlorhexidine 0.12% Liquid 15 milliLiter(s) Oral Mucosa every 12 hours  chlorhexidine 2% Cloths 1 Application(s) Topical <User Schedule>  enoxaparin Injectable 40 milliGRAM(s) SubCutaneous daily  levETIRAcetam 500 milliGRAM(s) Oral every 12 hours  lidocaine   Patch 1 Patch Transdermal every 24 hours  metoprolol tartrate 50 milliGRAM(s) Oral every 12 hours  nystatin Powder 1 Application(s) Topical two times a day  pantoprazole  Injectable 40 milliGRAM(s) IV Push daily  polyethylene glycol 3350 17 Gram(s) Oral two times a day  senna Syrup 5 milliLiter(s) Oral at bedtime  sodium chloride 1 Gram(s) Oral every 8 hours    MEDICATIONS  (PRN):  labetalol Injectable 10 milliGRAM(s) IV Push every 4 hours PRN SBP > 180      LABS:                        9.2    11.55 )-----------( 478      ( 10 Nov 2020 04:35 )             29.6     11-10    139  |  105  |  24<H>  ----------------------------<  144<H>  4.4   |  25  |  0.45<L>    Ca    8.5      10 Nov 2020 02:27  Phos  2.3     11-10  Mg     2.6     11-10      PT/INR - ( 10 Nov 2020 02:27 )   PT: 13.9 sec;   INR: 1.17 ratio         PTT - ( 10 Nov 2020 02:27 )  PTT:32.2 sec          PHYSICAL EXAM:  General: intubated, appears to be in NAD  Chest: Intubated on mechanical ventilation.   Neuro:  not following commands, withdrawing to pain on bilateral lower extremities   Abdomen: soft, nontender, nondistended            10-Aug-2021 11:28

## 2022-01-01 NOTE — PROGRESS NOTE ADULT - ASSESSMENT
Patient Education       Well Child Exam 6 Months   About this topic   Your baby's 6-month well child exam is a visit with the doctor to check your baby's health. The doctor measures your baby's weight, height, and head size. The doctor plots these numbers on a growth curve. The growth curve gives a picture of your baby's growth at each visit. The doctor may listen to your baby's heart, lungs, and belly. Your doctor will do a full exam of your baby from the head to the toes.  Your baby may also need shots or blood tests during this visit.  General   Growth and Development   Your doctor will ask you how your baby is developing. The doctor will focus on the skills that most children your baby's age are expected to do. During the first months of your baby's life, here are some things you can expect.  · Movement ? Your baby may:  ? Begin to sit up without help  ? Move a toy from one hand to the other  ? Roll from front to back and back to front  ? Use the legs to stand with your help  ? Be able to move forward or backward while on the belly  ? Become more mobile  ? Put everything in the mouth  § Never leave small objects within reach.  § Do not feed your baby hot dogs or hard food that could lead to choking.  § Cut all food into small pieces.  § Learn what to do if your baby chokes.  · Hearing, seeing, and talking ? Your baby will likely:  ? Make lots of babbling noises  ? May say things like da-da-da or ba-ba-ba or ma-ma-ma  ? Show a wide range of emotions on the face  ? Be more comfortable with familiar people and toys  ? Respond to their own name  ? Likes to look at self in mirror  · Feeding ? Your baby:  ? Takes breast milk or formula for most nutrition. Always hold your baby when feeding. Do not prop a bottle. Propping the bottle makes it easier for your baby to choke and get ear infections.  ? May be ready to start eating cereal and other baby foods. Signs your baby is ready are when your baby:  § Sits without  much support  § Has good head and neck control  § Shows interest in food you are eating  § Opens the mouth for a spoon  § Able to grasp and bring things up to mouth  ? Can start to eat thin cereal or pureed meats. Then, add fruits and vegetables.  § Do not add cereal to your baby's bottle. Feed it to your baby with a spoon.  § Do not force your baby to eat baby foods. You may have to offer a food more than 10 times before your baby will like it.  § It is OK to try giving your baby very small bites of soft finger foods like bananas or well cooked vegetables. If your baby coughs or chokes, then try again another time.  § Watch for signs your baby is full like turning the head or leaning back.  ? May start to have teeth. If so, brush them 2 times each day with a smear of toothpaste. Use a cold clean wash cloth or teething ring to help ease sore gums.  ? Will need you to clean the teeth after a feeding with a wet washcloth or a wet baby toothbrush. You may use a smear of toothpaste each day.  · Sleep ? Your baby:  ? Should still sleep in a safe crib, on the back, alone for naps and at night. Keep soft bedding, bumpers, loose blankets, and toys out of your baby's bed. It is OK if your baby rolls over without help at night.  ? Is likely sleeping about 6 to 8 hours in a row at night  ? Needs 2 to 3 naps each day  ? Sleeps about a total of 14 to 15 hours each day  ? Needs to learn how to fall asleep without help. Put your baby to bed while still awake. Your baby may cry. Check on your baby every 10 minutes or so until your baby falls asleep. Your baby will slowly learn to fall asleep.  ? Should not have a bottle in bed. This can cause tooth decay or ear infections. Give a bottle before putting your baby in the crib for the night.  ? Should sleep in a crib that is away from windows.  · Shots or vaccines ? It is important for your baby to get shots on time. This protects from very serious illnesses like lung infections,  81 yo F with hx of dementia, hydrocephalus s/p  shunt, recent hip fracture presents from SNF with AMS intubated with findings of large SHD with midline shift and L 8-10 rib fractures. Patient with midline shift and decreased subdural hematoma 11mm-> 7mm, now s/p extubation being further cared and managed in PCU meningitis, or infections that damage their nervous system. Your baby may need:  ? DTaP or diphtheria, tetanus, and pertussis vaccine  ? Hib or Haemophilus influenzae type b vaccine  ? IPV or polio vaccine  ? PCV or pneumococcal conjugate vaccine  ? RV or rotavirus vaccine  ? HepB or hepatitis B vaccine  ? Influenza vaccine  ? Some of these vaccines may be given as combined vaccines. This means your child may get fewer shots.  Help for Parents   · Play with your baby.  ? Tummy time is still important. It helps your baby develop arm and shoulder muscles. Do tummy time a few times each day while your baby is awake. Put a colorful toy in front of your baby to give something to look at or play with.  ? Read to your baby. Talk and sing to your baby. This helps your baby learn language skills.  ? Give your child toys that are safe to chew on. Most things will end up in your child's mouth, so keep away small objects and plastic bags.  ? Play peekaboo with your baby.  · Here are some things you can do to help keep your baby safe and healthy.  ? Do not allow anyone to smoke in your home or around your baby. Second hand smoke can harm your baby.  ? Have the right size car seat for your baby and use it every time your baby is in the car. Your baby should be rear facing until 2 years of age.  ? Keep one hand on the baby whenever you are changing a diaper or clothes.  ? Keep your baby in the shade, rather than in the sun. Doctors dont recommend sunscreen until children are 6 months and older.  ? Take extra care if your baby is in the kitchen.  § Make sure you use the back burners on the stove and turn pot handles so your baby cannot grab them.  § Keep hot items like liquids, coffee pots, and heaters away from your baby.  § Put childproof locks on cabinets, especially those that contain cleaning supplies or other things that may harm your baby.  ? Limit how much time your baby spends in an infant seat, bouncy seat, boppy chair,  or swing. Give your baby a safe place to play.  ? Remove or protect sharp edge furniture where your child plays.  ? Use safety latches on drawers and cabinets.  ? Keep cords from shades and blinds away as they can strangle your child.  ? Never leave your baby alone. Do not leave your child in the car, in the bath, or at home alone, even for a few minutes.  ? Avoid screen time for children under 2 years old. This means no TV, computers, or video games. They can cause problems with brain development.  · Parents need to think about:  ? How you will handle a sick child. Do you have alternate day care plans? Can you take off work or school?  ? How to childproof your home. Look for areas that may be a danger to a young child. Keep choking hazards, poisons, and hot objects out of a child's reach.  ? Do you live in an older home that may need to be tested for lead?  · Your next well child visit will most likely be when your baby is 9 months old. At this visit your doctor may:  ? Do a full check up on your baby  ? Talk about how your baby is sleeping and eating  ? Give your baby the next set of shots  ? Get their vision checked.         When do I need to call the doctor?   · Fever of 100.4°F (38°C) or higher  · Having problems eating or spits up a lot  · Sleeps all the time or has trouble sleeping  · Won't stop crying  · You are worried about your baby's development  Where can I learn more?   American Academy of Pediatrics  https://www.healthychildren.org/English/ages-stages/baby/Pages/Hearing-and-Making-Sounds.aspx   American Academy of Pediatrics  https://www.healthychildren.org/English/ages-stages/toddler/Pages/Milestones-During-The-First-2-Years.